# Patient Record
Sex: FEMALE | Race: WHITE | Employment: FULL TIME | URBAN - METROPOLITAN AREA
[De-identification: names, ages, dates, MRNs, and addresses within clinical notes are randomized per-mention and may not be internally consistent; named-entity substitution may affect disease eponyms.]

---

## 2017-01-16 ENCOUNTER — TELEPHONE (OUTPATIENT)
Dept: INTERNAL MEDICINE CLINIC | Age: 27
End: 2017-01-16

## 2017-01-16 RX ORDER — DEXTROAMPHETAMINE SACCHARATE, AMPHETAMINE ASPARTATE, DEXTROAMPHETAMINE SULFATE AND AMPHETAMINE SULFATE 5; 5; 5; 5 MG/1; MG/1; MG/1; MG/1
TABLET ORAL
Qty: 60 TAB | Refills: 0 | Status: SHIPPED | OUTPATIENT
Start: 2017-01-16 | End: 2017-03-16 | Stop reason: SDUPTHER

## 2017-01-17 ENCOUNTER — TELEPHONE (OUTPATIENT)
Dept: INTERNAL MEDICINE CLINIC | Age: 27
End: 2017-01-17

## 2017-01-17 RX ORDER — DEXTROAMPHETAMINE SACCHARATE, AMPHETAMINE ASPARTATE, DEXTROAMPHETAMINE SULFATE AND AMPHETAMINE SULFATE 5; 5; 5; 5 MG/1; MG/1; MG/1; MG/1
TABLET ORAL
Qty: 60 TAB | Refills: 0 | OUTPATIENT
Start: 2017-01-17 | End: 2017-02-16

## 2017-03-16 NOTE — TELEPHONE ENCOUNTER
Patient would like inhalers called into pharmacy ([id number]) before the weekend. She would like to  Adderall next week. Patient would like a call to confirm.

## 2017-03-17 RX ORDER — FLUTICASONE PROPIONATE 50 MCG
SPRAY, SUSPENSION (ML) NASAL
Qty: 1 BOTTLE | Refills: 0 | Status: SHIPPED | OUTPATIENT
Start: 2017-03-17 | End: 2017-06-02 | Stop reason: SDUPTHER

## 2017-03-17 RX ORDER — DEXTROAMPHETAMINE SACCHARATE, AMPHETAMINE ASPARTATE, DEXTROAMPHETAMINE SULFATE AND AMPHETAMINE SULFATE 5; 5; 5; 5 MG/1; MG/1; MG/1; MG/1
TABLET ORAL
Qty: 60 TAB | Refills: 0 | Status: SHIPPED | OUTPATIENT
Start: 2017-03-17 | End: 2017-06-02 | Stop reason: SDUPTHER

## 2017-03-17 RX ORDER — ALBUTEROL SULFATE 90 UG/1
1 AEROSOL, METERED RESPIRATORY (INHALATION)
Qty: 1 INHALER | Refills: 0 | Status: SHIPPED | OUTPATIENT
Start: 2017-03-17 | End: 2017-08-17 | Stop reason: SDUPTHER

## 2017-03-17 NOTE — TELEPHONE ENCOUNTER
Notify pt due now for follow up. Has appt in September but should have follow up appt now. Let her know script ready for  from .

## 2017-03-28 NOTE — TELEPHONE ENCOUNTER
Called pt and informed her script is ready for  at  and pt stated will make appt when she comes in to  script.

## 2017-06-02 RX ORDER — FLUTICASONE PROPIONATE 50 MCG
SPRAY, SUSPENSION (ML) NASAL
Qty: 1 BOTTLE | Refills: 0 | Status: SHIPPED | OUTPATIENT
Start: 2017-06-02 | End: 2017-09-08 | Stop reason: SDUPTHER

## 2017-06-03 RX ORDER — DEXTROAMPHETAMINE SACCHARATE, AMPHETAMINE ASPARTATE, DEXTROAMPHETAMINE SULFATE AND AMPHETAMINE SULFATE 5; 5; 5; 5 MG/1; MG/1; MG/1; MG/1
TABLET ORAL
Qty: 30 TAB | Refills: 0 | Status: SHIPPED | OUTPATIENT
Start: 2017-06-03 | End: 2017-07-01

## 2017-06-03 NOTE — TELEPHONE ENCOUNTER
Notify pt 30 tablets only given she is past due for follow up appt for management of controlled substance. She needs to schedule appt for now and will plan to do her physical in Sept as planned. She can  on tues June 6th.

## 2017-08-11 NOTE — TELEPHONE ENCOUNTER
Left detailed message to move her appt. Up to next week in order to get any refills per Dr Lashonda Mejia.

## 2017-08-11 NOTE — TELEPHONE ENCOUNTER
We can do a follow up appt soon than her Sept appt. Must be seen for refill. 2 previous appt were canceled. And past due.

## 2017-08-17 ENCOUNTER — OFFICE VISIT (OUTPATIENT)
Dept: INTERNAL MEDICINE CLINIC | Age: 27
End: 2017-08-17

## 2017-08-17 VITALS
HEART RATE: 75 BPM | WEIGHT: 116.6 LBS | HEIGHT: 66 IN | RESPIRATION RATE: 16 BRPM | OXYGEN SATURATION: 99 % | BODY MASS INDEX: 18.74 KG/M2 | DIASTOLIC BLOOD PRESSURE: 70 MMHG | SYSTOLIC BLOOD PRESSURE: 102 MMHG | TEMPERATURE: 98.2 F

## 2017-08-17 DIAGNOSIS — J45.30 MILD PERSISTENT ASTHMA WITHOUT COMPLICATION: ICD-10-CM

## 2017-08-17 DIAGNOSIS — F98.8 ADD (ATTENTION DEFICIT DISORDER): ICD-10-CM

## 2017-08-17 DIAGNOSIS — Z00.00 ROUTINE GENERAL MEDICAL EXAMINATION AT A HEALTH CARE FACILITY: ICD-10-CM

## 2017-08-17 DIAGNOSIS — Z00.00 ROUTINE GENERAL MEDICAL EXAMINATION AT A HEALTH CARE FACILITY: Primary | ICD-10-CM

## 2017-08-17 RX ORDER — DEXTROAMPHETAMINE SACCHARATE, AMPHETAMINE ASPARTATE, DEXTROAMPHETAMINE SULFATE AND AMPHETAMINE SULFATE 5; 5; 5; 5 MG/1; MG/1; MG/1; MG/1
20 TABLET ORAL DAILY
Qty: 30 TAB | Refills: 0 | Status: CANCELLED | OUTPATIENT
Start: 2017-08-17

## 2017-08-17 RX ORDER — DEXTROAMPHETAMINE SACCHARATE, AMPHETAMINE ASPARTATE, DEXTROAMPHETAMINE SULFATE AND AMPHETAMINE SULFATE 5; 5; 5; 5 MG/1; MG/1; MG/1; MG/1
TABLET ORAL
Qty: 60 TAB | Refills: 0 | Status: SHIPPED | OUTPATIENT
Start: 2017-10-17 | End: 2017-08-23 | Stop reason: SDUPTHER

## 2017-08-17 RX ORDER — DEXTROAMPHETAMINE SACCHARATE, AMPHETAMINE ASPARTATE, DEXTROAMPHETAMINE SULFATE AND AMPHETAMINE SULFATE 5; 5; 5; 5 MG/1; MG/1; MG/1; MG/1
TABLET ORAL
Qty: 60 TAB | Refills: 0 | Status: SHIPPED | OUTPATIENT
Start: 2017-09-17 | End: 2018-03-27

## 2017-08-17 RX ORDER — DEXTROAMPHETAMINE SACCHARATE, AMPHETAMINE ASPARTATE, DEXTROAMPHETAMINE SULFATE AND AMPHETAMINE SULFATE 5; 5; 5; 5 MG/1; MG/1; MG/1; MG/1
20 TABLET ORAL
COMMUNITY
End: 2018-03-02 | Stop reason: SDUPTHER

## 2017-08-17 RX ORDER — ALBUTEROL SULFATE 90 UG/1
1 AEROSOL, METERED RESPIRATORY (INHALATION)
Qty: 1 INHALER | Refills: 0 | Status: SHIPPED | OUTPATIENT
Start: 2017-08-17 | End: 2017-11-09 | Stop reason: SDUPTHER

## 2017-08-17 RX ORDER — DEXTROAMPHETAMINE SACCHARATE, AMPHETAMINE ASPARTATE, DEXTROAMPHETAMINE SULFATE AND AMPHETAMINE SULFATE 5; 5; 5; 5 MG/1; MG/1; MG/1; MG/1
TABLET ORAL
Qty: 60 TAB | Refills: 0 | Status: SHIPPED | OUTPATIENT
Start: 2017-08-17 | End: 2018-03-02 | Stop reason: SDUPTHER

## 2017-08-17 NOTE — PROGRESS NOTES
Chief Complaint   Patient presents with    Complete Physical     1. Have you been to the ER, urgent care clinic since your last visit? Hospitalized since your last visit? No    2. Have you seen or consulted any other health care providers outside of the [address] [place] [address] since your last visit? Include any pap smears or colon screening.  No

## 2017-08-17 NOTE — PROGRESS NOTES
Subjective:   28 y.o. female for Well Woman Check. Her gyne and breast care is done elsewhere by her Ob-Gyne physician. Health maintenance reviewed and updated with patient today at visit. Exercise:   Also here for follow up for asthma, ADD. Asthma: uses albuterol no more than 1 x a week except for prior to exercise. ADD: Tolerating medication well with no side effects. She takes primarily on her work days. Medication does help her focus. She denies any side effects. Current Outpatient Prescriptions   Medication Sig Dispense Refill    dextroamphetamine-amphetamine (ADDERALL) 20 mg tablet Take 20 mg by mouth.  fluticasone (FLONASE) 50 mcg/actuation nasal spray SHAKE WELL AND USE 2 SPRAYS IN EACH NOSTRIL EVERY DAY 1 Bottle 0    beclomethasone (QVAR) 80 mcg/actuation aero Take 1 Puff by inhalation two (2) times a day. Rinse mouth after dosing 1 Inhaler 0    albuterol (PROVENTIL HFA, VENTOLIN HFA, PROAIR HFA) 90 mcg/actuation inhaler Take 1 Puff by inhalation every six (6) hours as needed for Wheezing. 1 Inhaler 0    cetirizine (ZYRTEC) 10 mg tablet Take 10 mg by mouth daily as needed. 30 Tab 11    ibuprofen (MOTRIN) 600 mg tablet Take 1 Tab by mouth every six (6) hours as needed for Pain.  20 Tab 0     No Known Allergies  Past Medical History:   Diagnosis Date    ADD (attention deficit disorder)     Allergic rhinitis, cause unspecified     Asthma     exercise induced    MRSA (methicillin resistant Staphylococcus aureus) carrier      Past Surgical History:   Procedure Laterality Date    HX ORTHOPAEDIC  5/2006    orif  left femur metal and 3 pins      Family History   Problem Relation Age of Onset    Breast Cancer Mother     Cancer Mother 54     breast    Coronary Artery Disease Father     Heart Disease Father      cad with stints    Heart Disease Paternal Grandfather      cad stints     Social History   Substance Use Topics    Smoking status: Never Smoker    Smokeless tobacco: Never Used    Alcohol use 1.0 oz/week     2 Glasses of wine per week          ROS: Feeling generally well. No TIA's or unusual headaches, no dysphagia. No prolonged cough. No dyspnea or chest pain on exertion. No abdominal pain, change in bowel habits, black or bloody stools. No urinary tract symptoms. No new or unusual musculoskeletal symptoms. Objective: The patient appears well, alert, oriented x 3, in no distress. Visit Vitals    /70    Pulse 75    Temp 98.2 °F (36.8 °C)    Resp 16    Ht 5' 6.2\" (1.681 m)    Wt 116 lb 9.6 oz (52.9 kg)    LMP 08/14/2017    SpO2 99%    BMI 18.71 kg/m2     ENT normal.  Neck supple. No adenopathy or thyromegaly. JEF. Lungs are clear, good air entry, no wheezes, rhonchi or rales. S1 and S2 normal, no murmurs, regular rate and rhythm. Abdomen soft without tenderness, guarding, mass or organomegaly. Extremities show no edema, normal peripheral pulses. Neurological is normal, no focal findings. Breast and Pelvic exams are deferred. Assessment/Plan:     Diagnoses and all orders for this visit:    1. Routine general medical examination at a health care facility  Counseled continue healthy lifestyle, exercise, diet and weight.     -     LIPID PANEL  -     METABOLIC PANEL, COMPREHENSIVE  -     CBC WITH AUTOMATED DIFF; Future    2. ADD (attention deficit disorder)   Well controlled, continue current medication. 3. Mild persistent asthma without complication  Well controlled, continue current medication. Other orders  -     beclomethasone (QVAR) 80 mcg/actuation aero; Take 1 Puff by inhalation two (2) times a day. Rinse mouth after dosing  Indications: MAINTENANCE THERAPY FOR ASTHMA  -     albuterol (PROVENTIL HFA, VENTOLIN HFA, PROAIR HFA) 90 mcg/actuation inhaler; Take 1 Puff by inhalation every six (6) hours as needed for Wheezing.  -     dextroamphetamine-amphetamine (ADDERALL) 20 mg tablet; Earliest Fill Date: 10/17/17.   Take 1 tablet at 8 am and 1 tablet at 1 pm as needed for ADD. -     dextroamphetamine-amphetamine (ADDERALL) 20 mg tablet; Earliest Fill Date: 9/17/17. Take 1 tablet at 8 am and 1 tablet at 1 pm as needed for ADD. -     dextroamphetamine-amphetamine (ADDERALL) 20 mg tablet; Take 1 tablet at 8 am and 1 tablet at 1 pm as needed for ADD. I have discussed the diagnosis with the patient and the intended plan as seen in the above orders. The patient has received an after-visit summary and questions were answered concerning future plans. I have discussed medication side effects and warnings with the patient as well. She has expressed understanding of the diagnosis and plan    Follow-up Disposition:  Return in about 6 months (around 2/17/2018) for follow up.

## 2017-08-17 NOTE — MR AVS SNAPSHOT
Visit Information Date & Time Provider Department Dept. Phone Encounter #  
 8/17/2017  3:30 PM Caden Herrera, Alphonso9 [facility] Internal Medicine [phone] [id number] Follow-up Instructions Return in about 6 months (around 2/17/2018) for follow up. Upcoming Health Maintenance Date Due  
 PAP AKA CERVICAL CYTOLOGY 11/5/2018 DTaP/Tdap/Td series (3 - Td) 9/8/2025 Allergies as of 8/17/2017  Review Complete On: 8/17/2017 By: Caden Herrera MD  
 No Known Allergies Current Immunizations  Reviewed on 8/17/2017 Name Date HPV 5/9/2007, 1/9/2007, 11/7/2006 Hepatitis B Vaccine 9/17/1997 Human Papillomavirus 1/1/2008 TD Vaccine 1/1/2004 Tdap 9/8/2015  5:18 PM  
  
 Reviewed by Caden Herrera MD on 8/17/2017 at  4:08 PM  
 Reviewed by Caden Herrera MD on 8/17/2017 at  4:08 PM  
 Reviewed by Caden Herrera MD on 8/17/2017 at  4:09 PM  
 Reviewed by Caden Herrera MD on 8/17/2017 at  4:09 PM  
You Were Diagnosed With   
  
 Codes Comments Routine general medical examination at a health care facility    -  Primary ICD-10-CM: Z00.00 ICD-9-CM: V70.0 ADD (attention deficit disorder)     ICD-10-CM: F98.8 ICD-9-CM: 314.00 Mild persistent asthma without complication     XFK-07-HY: J45.30 ICD-9-CM: 493.90 Vitals BP Pulse Temp Resp Height(growth percentile) Weight(growth percentile) 102/70 75 98.2 °F (36.8 °C) 16 5' 6.2\" (1.681 m) 116 lb 9.6 oz (52.9 kg) LMP SpO2 BMI OB Status Smoking Status 08/14/2017 99% 18.71 kg/m2 Having regular periods Never Smoker BMI and BSA Data Body Mass Index Body Surface Area 18.71 kg/m 2 1.57 m 2 Preferred Pharmacy Pharmacy Name Phone [address] [place] [address] Dasha Lemus AT [facility]. [phone] Your Updated Medication List  
  
   
This list is accurate as of: 8/17/17  4:18 PM.  Always use your most recent med list.  
  
  
  
  
 * ADDERALL 20 mg tablet Generic drug:  dextroamphetamine-amphetamine Take 20 mg by mouth. * dextroamphetamine-amphetamine 20 mg tablet Commonly known as:  ADDERALL Take 1 tablet at 8 am and 1 tablet at 1 pm as needed for ADD. * dextroamphetamine-amphetamine 20 mg tablet Commonly known as:  ADDERALL Earliest Fill Date: 9/17/17. Take 1 tablet at 8 am and 1 tablet at 1 pm as needed for ADD. Start taking on:  9/17/2017 * dextroamphetamine-amphetamine 20 mg tablet Commonly known as:  ADDERALL Earliest Fill Date: 10/17/17. Take 1 tablet at 8 am and 1 tablet at 1 pm as needed for ADD. Start taking on:  10/17/2017  
  
 albuterol 90 mcg/actuation inhaler Commonly known as:  PROVENTIL HFA, VENTOLIN HFA, PROAIR HFA Take 1 Puff by inhalation every six (6) hours as needed for Wheezing. beclomethasone 80 mcg/actuation [employer] Commonly known as:  QVAR Take 1 Puff by inhalation two (2) times a day. Rinse mouth after dosing  Indications: MAINTENANCE THERAPY FOR ASTHMA  
  
 cetirizine 10 mg tablet Commonly known as:  ZYRTEC Take 10 mg by mouth daily as needed. fluticasone 50 mcg/actuation nasal spray Commonly known as:  Natasha Solano SHAKE WELL AND USE 2 SPRAYS IN EACH NOSTRIL EVERY DAY  
  
 ibuprofen 600 mg tablet Commonly known as:  MOTRIN Take 1 Tab by mouth every six (6) hours as needed for Pain. * Notice: This list has 4 medication(s) that are the same as other medications prescribed for you. Read the directions carefully, and ask your doctor or other care provider to review them with you. Prescriptions Printed Refills  
 dextroamphetamine-amphetamine (ADDERALL) 20 mg tablet 0 Starting on: 10/17/2017 Sig: Chanel Riis Date: 10/17/17. Take 1 tablet at 8 am and 1 tablet at 1 pm as needed for ADD. Class: Print  
 dextroamphetamine-amphetamine (ADDERALL) 20 mg tablet 0 Starting on: 9/17/2017 Sig: Earliest Praful Liang Date: 9/17/17. Take 1 tablet at 8 am and 1 tablet at 1 pm as needed for ADD. Class: Print  
 dextroamphetamine-amphetamine (ADDERALL) 20 mg tablet 0 Sig: Take 1 tablet at 8 am and 1 tablet at 1 pm as needed for ADD. Class: Print Prescriptions Sent to Pharmacy Refills  
 beclomethasone (QVAR) 80 mcg/actuation aero 1 Sig: Take 1 Puff by inhalation two (2) times a day. Rinse mouth after dosing  Indications: MAINTENANCE THERAPY FOR ASTHMA Class: Normal  
 Pharmacy: [facility] [place] [address] Ph #: [phone] Route: Inhalation  
 albuterol (PROVENTIL HFA, VENTOLIN HFA, PROAIR HFA) 90 mcg/actuation inhaler 0 Sig: Take 1 Puff by inhalation every six (6) hours as needed for Wheezing. Class: Normal  
 Pharmacy: [facility] [place] [address] Ph #: [phone] Route: Inhalation We Performed the Following LIPID PANEL [61697 CPT(R)] METABOLIC PANEL, COMPREHENSIVE [81061 CPT(R)] Follow-up Instructions Return in about 6 months (around 2/17/2018) for follow up. To-Do List   
 08/17/2017 Lab:  CBC WITH AUTOMATED DIFF Introducing [facility] SERVICES! Yvonne Ferrera introduces Biz360 patient portal. Now you can access parts of your medical record, email your doctor's office, and request medication refills online. 1. In your internet browser, go to [email or web address] 2. Click on the First Time User? Click Here link in the Sign In box. You will see the New Member Sign Up page. 3. Enter your Biz360 Access Code exactly as it appears below. You will not need to use this code after youve completed the sign-up process. If you do not sign up before the expiration date, you must request a new code. · Biz360 Access Code: [email or web address] Expires: 11/15/2017  3:35 PM 
 
 4. Enter the last four digits of your Social Security Number (xxxx) and Date of Birth (mm/dd/yyyy) as indicated and click Submit. You will be taken to the next sign-up page. 5. Create a Dogecoin ID. This will be your Dogecoin login ID and cannot be changed, so think of one that is secure and easy to remember. 6. Create a Dogecoin password. You can change your password at any time. 7. Enter your Password Reset Question and Answer. This can be used at a later time if you forget your password. 8. Enter your e-mail address. You will receive e-mail notification when new information is available in [address] 9. Click Sign Up. You can now view and download portions of your medical record. 10. Click the Download Summary menu link to download a portable copy of your medical information. If you have questions, please visit the Frequently Asked Questions section of the Dogecoin website. Remember, Dogecoin is NOT to be used for urgent needs. For medical emergencies, dial 911. Now available from your iPhone and Android! Please provide this summary of care documentation to your next provider. Your primary care clinician is listed as Rozina Mae. If you have any questions after today's visit, please call [phone].

## 2017-08-22 ENCOUNTER — TELEPHONE (OUTPATIENT)
Dept: INTERNAL MEDICINE CLINIC | Age: 27
End: 2017-08-22

## 2017-08-22 NOTE — TELEPHONE ENCOUNTER
Called pt and pt states she has lost her script for Adderall. Pt states she had it filled on Friday and took one dose on Friday and then put it in her bag/purse. She then states she does not take it on the weekend and then went to take it on Monday and stated it was not in her bag. 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

## 2017-08-22 NOTE — TELEPHONE ENCOUNTER
[phone] pt says that she just had her adderall rx filled on Friday and has lost the meds, she wonders if she could get another rx.

## 2017-08-23 RX ORDER — DEXTROAMPHETAMINE SACCHARATE, AMPHETAMINE ASPARTATE, DEXTROAMPHETAMINE SULFATE AND AMPHETAMINE SULFATE 5; 5; 5; 5 MG/1; MG/1; MG/1; MG/1
TABLET ORAL
Qty: 60 TAB | Refills: 0 | Status: SHIPPED | OUTPATIENT
Start: 2017-10-17 | End: 2017-08-25 | Stop reason: SDUPTHER

## 2017-08-23 NOTE — TELEPHONE ENCOUNTER
Notify pt will print another prescription but our policy is can only do once for lost medication. If lost again will not be able to give new prescription. Also insurance likely will require her to pay out of pocket. [facility]  [facility]. She can  prescription on August 24.

## 2017-08-23 NOTE — TELEPHONE ENCOUNTER
Called pt and informed her about the regulations for losing a prescription that is a controlled substance. Also informed her we will not be able to send in a new script if she loses it again Also informed pt that she will more than likely have to pay out of pocket for the prescription. And the prescription will be ready on 08/24/17. Pt verbalized understanding.

## 2017-08-25 ENCOUNTER — TELEPHONE (OUTPATIENT)
Dept: INTERNAL MEDICINE CLINIC | Age: 27
End: 2017-08-25

## 2017-08-25 RX ORDER — DEXTROAMPHETAMINE SACCHARATE, AMPHETAMINE ASPARTATE, DEXTROAMPHETAMINE SULFATE AND AMPHETAMINE SULFATE 5; 5; 5; 5 MG/1; MG/1; MG/1; MG/1
TABLET ORAL
Qty: 46 TAB | Refills: 0 | Status: SHIPPED | OUTPATIENT
Start: 2017-08-25 | End: 2018-03-27

## 2017-10-30 ENCOUNTER — OFFICE VISIT (OUTPATIENT)
Dept: INTERNAL MEDICINE CLINIC | Age: 27
End: 2017-10-30

## 2017-10-30 VITALS
HEIGHT: 66 IN | DIASTOLIC BLOOD PRESSURE: 80 MMHG | HEART RATE: 72 BPM | SYSTOLIC BLOOD PRESSURE: 104 MMHG | WEIGHT: 118 LBS | RESPIRATION RATE: 16 BRPM | OXYGEN SATURATION: 97 % | BODY MASS INDEX: 18.96 KG/M2 | TEMPERATURE: 98.1 F

## 2017-10-30 DIAGNOSIS — J45.41 MODERATE PERSISTENT ASTHMA WITH ACUTE EXACERBATION: Primary | ICD-10-CM

## 2017-10-30 RX ORDER — AZITHROMYCIN 250 MG/1
TABLET, FILM COATED ORAL
Qty: 6 TAB | Refills: 0 | Status: SHIPPED | OUTPATIENT
Start: 2017-10-30 | End: 2018-03-27

## 2017-10-30 RX ORDER — PREDNISONE 10 MG/1
10 TABLET ORAL SEE ADMIN INSTRUCTIONS
Qty: 21 TAB | Refills: 0 | Status: SHIPPED | OUTPATIENT
Start: 2017-10-30 | End: 2018-03-27

## 2017-10-30 NOTE — PATIENT INSTRUCTIONS
It was a pleasure to see you! As discussed:       Asthma Attack: Care Instructions  Your Care Instructions    During an asthma attack, the airways swell and narrow. This makes it hard to breathe. Severe asthma attacks can be life-threatening, but you can help prevent them by keeping your asthma under control and treating symptoms before they get bad. Symptoms include being short of breath, having chest tightness, coughing, and wheezing. Noting and treating these symptoms can also help you avoid future trips to the emergency room. The doctor has checked you carefully, but problems can develop later. If you notice any problems or new symptoms, get medical treatment right away. Follow-up care is a key part of your treatment and safety. Be sure to make and go to all appointments, and call your doctor if you are having problems. It's also a good idea to know your test results and keep a list of the medicines you take. How can you care for yourself at home? · Follow your asthma action plan to prevent and treat attacks. If you don't have an asthma action plan, work with your doctor to create one. · Take your asthma medicines exactly as prescribed. Talk to your doctor right away if you have any questions about how to take them. ¨ Use your quick-relief medicine when you have symptoms of an attack. Quick-relief medicine is usually an albuterol inhaler. Some people need to use quick-relief medicine before they exercise. ¨ Take your controller medicine every day, not just when you have symptoms. Controller medicine is usually an inhaled corticosteroid. The goal is to prevent problems before they occur. Don't use your controller medicine to treat an attack that has already started. It doesn't work fast enough to help. ¨ If your doctor prescribed corticosteroid pills to use during an attack, take them exactly as prescribed.  It may take hours for the pills to work, but they may make the episode shorter and help you breathe better. ¨ Keep your quick-relief medicine with you at all times. · Talk to your doctor before using other medicines. Some medicines, such as aspirin, can cause asthma attacks in some people. · If you have a peak flow meter, use it to check how well you are breathing. This can help you predict when an asthma attack is going to occur. Then you can take medicine to prevent the asthma attack or make it less severe. · Do not smoke or allow others to smoke around you. Avoid smoky places. Smoking makes asthma worse. If you need help quitting, talk to your doctor about stop-smoking programs and medicines. These can increase your chances of quitting for good. · Learn what triggers an asthma attack for you, and avoid the triggers when you can. Common triggers include colds, smoke, air pollution, dust, pollen, mold, pets, cockroaches, stress, and cold air. · Avoid colds and the flu. Get a pneumococcal vaccine shot. If you have had one before, ask your doctor if you need a second dose. Get a flu vaccine every fall. If you must be around people with colds or the flu, wash your hands often. When should you call for help? Call 911 anytime you think you may need emergency care. For example, call if:  ? · You have severe trouble breathing. ?Call your doctor now or seek immediate medical care if:  ? · Your symptoms do not get better after you have followed your asthma action plan. ? · You have new or worse trouble breathing. ? · Your coughing and wheezing get worse. ? · You cough up dark brown or bloody mucus (sputum). ? · You have a new or higher fever. ? Watch closely for changes in your health, and be sure to contact your doctor if:  ? · You need to use quick-relief medicine on more than 2 days a week (unless it is just for exercise). ? · You cough more deeply or more often, especially if you notice more mucus or a change in the color of your mucus. ? · You are not getting better as expected.    Where can you learn more? Go to http://jasson-yamil.info/. Enter C616 in the search box to learn more about \"Asthma Attack: Care Instructions. \"  Current as of: May 12, 2017  Content Version: 11.4  © 5408-7523 Healthwise, [employer]. Care instructions adapted under license by [employer] (which disclaims liability or warranty for this information). If you have questions about a medical condition or this instruction, always ask your healthcare professional. [place] [address] any warranty or liability for your use of this information.

## 2017-10-30 NOTE — PROGRESS NOTES
HISTORY OF PRESENT ILLNESS  Tatiana Domingo is a 28 y.o. female. Cold Symptoms   The current episode started more than 2 days ago. The problem has been gradually worsening. The cough is productive of purulent sputum. There has been no fever. Associated symptoms include chest pain (tightness ), rhinorrhea, shortness of breath and wheezing. Pertinent negatives include no chills, no ear congestion, no ear pain and no sore throat. She has tried inhalers for the symptoms. Risk factors: sick contacts. She is not a smoker. Her past medical history is significant for asthma. Review of Systems   Constitutional: Negative for chills. HENT: Positive for rhinorrhea. Negative for ear pain and sore throat. Respiratory: Positive for shortness of breath and wheezing. Cardiovascular: Positive for chest pain (tightness ). Patient Active Problem List    Diagnosis Date Noted    Allergic rhinitis 06/18/2015    Asthma 07/15/2014    Asthma, exercise induced 09/16/2011    ADD (attention deficit disorder) 09/17/2010       Current Outpatient Prescriptions   Medication Sig Dispense Refill    beclomethasone (QVAR) 80 mcg/actuation aero Take 1 Puff by inhalation two (2) times a day. Rinse mouth after dosing 3 Inhaler 0    predniSONE (STERAPRED DS) 10 mg dose pack Take 1 Tab by mouth See Admin Instructions. See administration instruction per 10mg dose pack 21 Tab 0    azithromycin (ZITHROMAX) 250 mg tablet Day 1 take 2 tabs by mouth; Days 2-5 take one tab by mouth a day 6 Tab 0    fluticasone (FLONASE) 50 mcg/actuation nasal spray SHAKE LIQUID AND USE 2 SPRAYS IN EACH NOSTRIL EVERY DAY 1 Bottle 5    dextroamphetamine-amphetamine (ADDERALL) 20 mg tablet Take 20 mg by mouth.  albuterol (PROVENTIL HFA, VENTOLIN HFA, PROAIR HFA) 90 mcg/actuation inhaler Take 1 Puff by inhalation every six (6) hours as needed for Wheezing.  1 Inhaler 0    dextroamphetamine-amphetamine (ADDERALL) 20 mg tablet Take 1 tablet at 8 am and 1 tablet at 1 pm as needed for ADD. 60 Tab 0    cetirizine (ZYRTEC) 10 mg tablet Take 10 mg by mouth daily as needed. 30 Tab 11    ibuprofen (MOTRIN) 600 mg tablet Take 1 Tab by mouth every six (6) hours as needed for Pain. 20 Tab 0       No Known Allergies   Visit Vitals    /80 (BP 1 Location: Right arm, BP Patient Position: Sitting)    Pulse 72    Temp 98.1 °F (36.7 °C) (Oral)    Resp 16    Ht 5' 6.2\" (1.681 m)    Wt 118 lb (53.5 kg)    SpO2 97%    BMI 18.93 kg/m2       Physical Exam   Constitutional: She is oriented to person, place, and time. She appears well-developed. No distress. Eyes: Conjunctivae are normal.   Neck: Neck supple. No thyromegaly present. Cardiovascular: Normal rate, regular rhythm and normal heart sounds. Pulmonary/Chest: No respiratory distress. She has no wheezes. She has no rales. She exhibits no tenderness. Prolonged expiratory phase and coughing    Lymphadenopathy:     She has no cervical adenopathy. Neurological: She is alert and oriented to person, place, and time. Skin: Skin is warm. Psychiatric: She has a normal mood and affect. ASSESSMENT and PLAN  Diagnoses and all orders for this visit:    1. Moderate persistent asthma with acute exacerbation- due to bacterial bronchitis based on s/s. Treatment as ordered. Red flags to warrant ER or earlier clinical evaluation reviewed. See AVS for full details of plan and patient discussion.     -     beclomethasone (QVAR) 80 mcg/actuation aero; Take 1 Puff by inhalation two (2) times a day. Rinse mouth after dosing  -     predniSONE (STERAPRED DS) 10 mg dose pack; Take 1 Tab by mouth See Admin Instructions. See administration instruction per 10mg dose pack  -     azithromycin (ZITHROMAX) 250 mg tablet; Day 1 take 2 tabs by mouth; Days 2-5 take one tab by mouth a day      Follow-up Disposition:  Return if symptoms worsen or fail to improve within 5 days.     Medication risks/benefits/costs/interactions/alternatives discussed with patient. Dayton Domingamanjindertrupti  was given an after visit summary which includes diagnoses, current medications, & vitals. she expressed understanding with the diagnosis and plan.

## 2017-10-30 NOTE — MR AVS SNAPSHOT
Visit Information Date & Time Provider Department Dept. Phone Encounter #  
 10/30/2017  2:45 PM Rolando Calhoun MD [address] [place] [address] Internal Medicine [phone] [id number] Follow-up Instructions Return if symptoms worsen or fail to improve within 5 days. Upcoming Health Maintenance Date Due  
 PAP AKA CERVICAL CYTOLOGY 11/5/2018 DTaP/Tdap/Td series (3 - Td) 9/8/2025 Allergies as of 10/30/2017  Review Complete On: 10/30/2017 By: Rolando Calhoun MD  
 No Known Allergies Current Immunizations  Reviewed on 8/17/2017 Name Date HPV 5/9/2007, 1/9/2007, 11/7/2006 Hepatitis B Vaccine 9/17/1997 Human Papillomavirus 1/1/2008 TD Vaccine 1/1/2004 Tdap 9/8/2015  5:18 PM  
  
 Not reviewed this visit You Were Diagnosed With   
  
 Codes Comments Moderate persistent asthma with acute exacerbation    -  Primary ICD-10-CM: J45.41 
ICD-9-CM: 493.92 Vitals BP Pulse Temp Resp Height(growth percentile) Weight(growth percentile) 104/80 (BP 1 Location: Right arm, BP Patient Position: Sitting) 72 98.1 °F (36.7 °C) (Oral) 16 5' 6.2\" (1.681 m) 118 lb (53.5 kg) LMP SpO2 BMI OB Status Smoking Status 10/29/2017 97% 18.93 kg/m2 Having regular periods Never Smoker Vitals History BMI and BSA Data Body Mass Index Body Surface Area  
 18.93 kg/m 2 1.58 m 2 Preferred Pharmacy Pharmacy Name Phone [facility] [place], [address] [place] [address] Rd AT [address] [phone] Your Updated Medication List  
  
   
This list is accurate as of: 10/30/17  3:56 PM.  Always use your most recent med list.  
  
  
  
  
 * ADDERALL 20 mg tablet Generic drug:  dextroamphetamine-amphetamine Take 20 mg by mouth. * dextroamphetamine-amphetamine 20 mg tablet Commonly known as:  ADDERALL Take 1 tablet at 8 am and 1 tablet at 1 pm as needed for ADD. albuterol 90 mcg/actuation inhaler Commonly known as:  PROVENTIL HFA, VENTOLIN HFA, PROAIR HFA Take 1 Puff by inhalation every six (6) hours as needed for Wheezing. azithromycin 250 mg tablet Commonly known as:  Sherren Mohair Day 1 take 2 tabs by mouth; Days 2-5 take one tab by mouth a day  
  
 beclomethasone 80 mcg/actuation Aero Commonly known as:  QVAR Take 1 Puff by inhalation two (2) times a day. Rinse mouth after dosing  
  
 cetirizine 10 mg tablet Commonly known as:  ZYRTEC Take 10 mg by mouth daily as needed. fluticasone 50 mcg/actuation nasal spray Commonly known as:  [place] SHAKE LIQUID AND USE 2 SPRAYS IN EACH NOSTRIL EVERY DAY  
  
 ibuprofen 600 mg tablet Commonly known as:  MOTRIN Take 1 Tab by mouth every six (6) hours as needed for Pain. predniSONE 10 mg dose pack Commonly known as:  STERAPRED DS Take 1 Tab by mouth See Admin Instructions. See administration instruction per 10mg dose pack * Notice: This list has 2 medication(s) that are the same as other medications prescribed for you. Read the directions carefully, and ask your doctor or other care provider to review them with you. Prescriptions Sent to Pharmacy Refills  
 beclomethasone (QVAR) 80 mcg/actuation aero 0 Sig: Take 1 Puff by inhalation two (2) times a day. Rinse mouth after dosing Class: Normal  
 [facility] [place] [address] Ph #: [phone] Route: Inhalation  
 predniSONE (STERAPRED DS) 10 mg dose pack 0 Sig: Take 1 Tab by mouth See Admin Instructions. See administration instruction per 10mg dose pack Class: Normal  
 [facility] [place] [address] Ph #: [phone] Route: Oral  
 azithromycin (ZITHROMAX) 250 mg tablet 0 Sig: Day 1 take 2 tabs by mouth; Days 2-5 take one tab by mouth a day  Class: Normal  
 Pharmacy: [facility] [place], [address]  #: [phone] Follow-up Instructions Return if symptoms worsen or fail to improve within 5 days. Patient Instructions It was a pleasure to see you! As discussed: 
 
  
Asthma Attack: Care Instructions Your Care Instructions During an asthma attack, the airways swell and narrow. This makes it hard to breathe. Severe asthma attacks can be life-threatening, but you can help prevent them by keeping your asthma under control and treating symptoms before they get bad. Symptoms include being short of breath, having chest tightness, coughing, and wheezing. Noting and treating these symptoms can also help you avoid future trips to the emergency room. The doctor has checked you carefully, but problems can develop later. If you notice any problems or new symptoms, get medical treatment right away. Follow-up care is a key part of your treatment and safety. Be sure to make and go to all appointments, and call your doctor if you are having problems. It's also a good idea to know your test results and keep a list of the medicines you take. How can you care for yourself at home? · Follow your asthma action plan to prevent and treat attacks. If you don't have an asthma action plan, work with your doctor to create one. · Take your asthma medicines exactly as prescribed. Talk to your doctor right away if you have any questions about how to take them. ¨ Use your quick-relief medicine when you have symptoms of an attack. Quick-relief medicine is usually an albuterol inhaler. Some people need to use quick-relief medicine before they exercise. ¨ Take your controller medicine every day, not just when you have symptoms. Controller medicine is usually an inhaled corticosteroid. The goal is to prevent problems before they occur.  Don't use your controller medicine to treat an attack that has already started. It doesn't work fast enough to help. ¨ If your doctor prescribed corticosteroid pills to use during an attack, take them exactly as prescribed. It may take hours for the pills to work, but they may make the episode shorter and help you breathe better. ¨ Keep your quick-relief medicine with you at all times. · Talk to your doctor before using other medicines. Some medicines, such as aspirin, can cause asthma attacks in some people. · If you have a peak flow meter, use it to check how well you are breathing. This can help you predict when an asthma attack is going to occur. Then you can take medicine to prevent the asthma attack or make it less severe. · Do not smoke or allow others to smoke around you. Avoid smoky places. Smoking makes asthma worse. If you need help quitting, talk to your doctor about stop-smoking programs and medicines. These can increase your chances of quitting for good. · Learn what triggers an asthma attack for you, and avoid the triggers when you can. Common triggers include colds, smoke, air pollution, dust, pollen, mold, pets, cockroaches, stress, and cold air. · Avoid colds and the flu. Get a pneumococcal vaccine shot. If you have had one before, ask your doctor if you need a second dose. Get a flu vaccine every fall. If you must be around people with colds or the flu, wash your hands often. When should you call for help? Call 911 anytime you think you may need emergency care. For example, call if: 
? · You have severe trouble breathing. ?Call your doctor now or seek immediate medical care if: 
? · Your symptoms do not get better after you have followed your asthma action plan. ? · You have new or worse trouble breathing. ? · Your coughing and wheezing get worse. ? · You cough up dark brown or bloody mucus (sputum). ? · You have a new or higher fever. ?Watch closely for changes in your health, and be sure to contact your doctor if: 
? · You need to use quick-relief medicine on more than 2 days a week (unless it is just for exercise). ? · You cough more deeply or more often, especially if you notice more mucus or a change in the color of your mucus. ? · You are not getting better as expected. Where can you learn more? Go to http://jasson-yamil.info/. Enter T443 in the search box to learn more about \"Asthma Attack: Care Instructions. \" Current as of: May 12, 2017 Content Version: 11.4 © 2564-0043 [employer]. Care instructions adapted under license by [employer] (which disclaims liability or warranty for this information). If you have questions about a medical condition or this instruction, always ask your healthcare professional. [address] any warranty or liability for your use of this information. Introducing [facility] & HEALTH SERVICES! Jt Choudhury introduces Kelan patient portal. Now you can access parts of your medical record, email your doctor's office, and request medication refills online. 1. In your internet browser, go to [email or web address] 2. Click on the First Time User? Click Here link in the Sign In box. You will see the New Member Sign Up page. 3. Enter your Kelan Access Code exactly as it appears below. You will not need to use this code after youve completed the sign-up process. If you do not sign up before the expiration date, you must request a new code. · Kelan Access Code: [email or web address] Expires: 11/15/2017  3:35 PM 
 
4. Enter the last four digits of your Social Security Number (xxxx) and Date of Birth (mm/dd/yyyy) as indicated and click Submit. You will be taken to the next sign-up page. 5. Create a Kelan ID. This will be your Kelan login ID and cannot be changed, so think of one that is secure and easy to remember. 6. Create a MoveEZ password. You can change your password at any time. 7. Enter your Password Reset Question and Answer. This can be used at a later time if you forget your password. 8. Enter your e-mail address. You will receive e-mail notification when new information is available in [address] 9. Click Sign Up. You can now view and download portions of your medical record. 10. Click the Download Summary menu link to download a portable copy of your medical information. If you have questions, please visit the Frequently Asked Questions section of the MoveEZ website. Remember, MoveEZ is NOT to be used for urgent needs. For medical emergencies, dial 911. Now available from your iPhone and Android! Please provide this summary of care documentation to your next provider. Your primary care clinician is listed as Juana Bound. If you have any questions after today's visit, please call [phone].

## 2017-10-30 NOTE — PROGRESS NOTES
Chief Complaint   Patient presents with    Cold Symptoms     1. Have you been to the ER, urgent care clinic since your last visit? Hospitalized since your last visit? No    2. Have you seen or consulted any other health care providers outside of the [address] [place] [address] since your last visit? Include any pap smears or colon screening.  No      patient states Friday, chest tightness, cough-yellow

## 2017-11-09 RX ORDER — ALBUTEROL SULFATE 90 UG/1
1 AEROSOL, METERED RESPIRATORY (INHALATION)
Qty: 1 INHALER | Refills: 0 | Status: SHIPPED | OUTPATIENT
Start: 2017-11-09 | End: 2018-03-13 | Stop reason: SDUPTHER

## 2018-03-02 DIAGNOSIS — F90.0 ATTENTION DEFICIT HYPERACTIVITY DISORDER (ADHD), PREDOMINANTLY INATTENTIVE TYPE: Primary | ICD-10-CM

## 2018-03-02 RX ORDER — DEXTROAMPHETAMINE SACCHARATE, AMPHETAMINE ASPARTATE, DEXTROAMPHETAMINE SULFATE AND AMPHETAMINE SULFATE 5; 5; 5; 5 MG/1; MG/1; MG/1; MG/1
20 TABLET ORAL DAILY
Qty: 30 TAB | Refills: 0 | Status: SHIPPED | OUTPATIENT
Start: 2018-03-02 | End: 2018-12-28 | Stop reason: SDUPTHER

## 2018-03-02 NOTE — TELEPHONE ENCOUNTER
Notify patient 30 day prescription ready for pickup. She is due for an appointment at this time. Please schedule.

## 2018-03-02 NOTE — TELEPHONE ENCOUNTER
Spoke with patient, verified name and  Advised patient that Rx is ready for pickup at this time. She expressed understanding. I also informed patient that she is due for a appointment. She stated that she would have to call back to schedule appointment.

## 2018-03-13 RX ORDER — ALBUTEROL SULFATE 90 UG/1
1 AEROSOL, METERED RESPIRATORY (INHALATION)
Qty: 1 INHALER | Refills: 0 | Status: SHIPPED | OUTPATIENT
Start: 2018-03-13 | End: 2018-07-03 | Stop reason: SDUPTHER

## 2018-03-27 ENCOUNTER — OFFICE VISIT (OUTPATIENT)
Dept: INTERNAL MEDICINE CLINIC | Age: 28
End: 2018-03-27

## 2018-03-27 VITALS
OXYGEN SATURATION: 96 % | BODY MASS INDEX: 19.19 KG/M2 | HEIGHT: 66 IN | RESPIRATION RATE: 16 BRPM | HEART RATE: 72 BPM | DIASTOLIC BLOOD PRESSURE: 70 MMHG | WEIGHT: 119.4 LBS | SYSTOLIC BLOOD PRESSURE: 100 MMHG | TEMPERATURE: 98.1 F

## 2018-03-27 DIAGNOSIS — J30.89 CHRONIC NON-SEASONAL ALLERGIC RHINITIS, UNSPECIFIED TRIGGER: ICD-10-CM

## 2018-03-27 DIAGNOSIS — F90.0 ATTENTION DEFICIT HYPERACTIVITY DISORDER (ADHD), PREDOMINANTLY INATTENTIVE TYPE: ICD-10-CM

## 2018-03-27 DIAGNOSIS — J45.20 MILD INTERMITTENT ASTHMA WITHOUT COMPLICATION: Primary | ICD-10-CM

## 2018-03-27 RX ORDER — DEXTROAMPHETAMINE SACCHARATE, AMPHETAMINE ASPARTATE, DEXTROAMPHETAMINE SULFATE AND AMPHETAMINE SULFATE 5; 5; 5; 5 MG/1; MG/1; MG/1; MG/1
TABLET ORAL
Qty: 60 TAB | Refills: 0 | Status: SHIPPED | OUTPATIENT
Start: 2018-06-12 | End: 2018-07-12

## 2018-03-27 RX ORDER — DEXTROAMPHETAMINE SACCHARATE, AMPHETAMINE ASPARTATE, DEXTROAMPHETAMINE SULFATE AND AMPHETAMINE SULFATE 5; 5; 5; 5 MG/1; MG/1; MG/1; MG/1
TABLET ORAL
Qty: 60 TAB | Refills: 0 | Status: SHIPPED | OUTPATIENT
Start: 2018-04-12 | End: 2018-04-18

## 2018-03-27 RX ORDER — DEXTROAMPHETAMINE SACCHARATE, AMPHETAMINE ASPARTATE, DEXTROAMPHETAMINE SULFATE AND AMPHETAMINE SULFATE 5; 5; 5; 5 MG/1; MG/1; MG/1; MG/1
20 TABLET ORAL DAILY
Qty: 30 TAB | Refills: 0 | Status: CANCELLED | OUTPATIENT
Start: 2018-03-27

## 2018-03-27 RX ORDER — DEXTROAMPHETAMINE SACCHARATE, AMPHETAMINE ASPARTATE, DEXTROAMPHETAMINE SULFATE AND AMPHETAMINE SULFATE 5; 5; 5; 5 MG/1; MG/1; MG/1; MG/1
TABLET ORAL
Qty: 60 TAB | Refills: 0 | Status: SHIPPED | OUTPATIENT
Start: 2018-05-12 | End: 2018-06-12

## 2018-03-27 RX ORDER — FLUTICASONE PROPIONATE 50 MCG
SPRAY, SUSPENSION (ML) NASAL
Qty: 1 BOTTLE | Refills: 5 | Status: SHIPPED | OUTPATIENT
Start: 2018-03-27

## 2018-03-27 NOTE — PROGRESS NOTES
HPI:  Olivia Hurst is a 33y.o. year old female who returns to clinic today for follow up:   asthma, ADD and allergic rhinitis. .  1.  Asthma: uses albuterol no more than 1 x every 2-3 weeks except for prior to exercise. She is exercising regularly with no problems. 2.  ADD: Tolerating medication well with no side effects. She often takes only one time a day. She takes primarily on her work days. Medication does help her focus. She denies any side effects. 3.  Allergic rhinitis: She is not using Zyrtec but is using Flonase daily with good control of her symptoms and states allergies are mainly to cats. Current Outpatient Prescriptions   Medication Sig Dispense Refill    albuterol (PROVENTIL HFA, VENTOLIN HFA, PROAIR HFA) 90 mcg/actuation inhaler Take 1 Puff by inhalation every six (6) hours as needed for Wheezing. 1 Inhaler 0    dextroamphetamine-amphetamine (ADDERALL) 20 mg tablet Take 1 Tab (20 mg total) by mouth dailyEarliest Fill Date: 3/2/18. As needed Max Daily Amount: 20 mg 30 Tab 0    beclomethasone (QVAR) 80 mcg/actuation aero Take 1 Puff by inhalation two (2) times a day. Rinse mouth after dosing 3 Inhaler 0    fluticasone (FLONASE) 50 mcg/actuation nasal spray SHAKE LIQUID AND USE 2 SPRAYS IN EACH NOSTRIL EVERY DAY 1 Bottle 5    ibuprofen (MOTRIN) 600 mg tablet Take 1 Tab by mouth every six (6) hours as needed for Pain. 20 Tab 0     No Known Allergies  Social History   Substance Use Topics    Smoking status: Never Smoker    Smokeless tobacco: Never Used    Alcohol use 1.0 oz/week     2 Glasses of wine per week         Review of Systems   Constitutional: Negative for malaise/fatigue. Respiratory: Negative for shortness of breath. Cardiovascular: Negative for chest pain, palpitations and leg swelling. Gastrointestinal: Negative for abdominal pain and heartburn. Neurological: Negative for dizziness and headaches. Psychiatric/Behavioral: The patient does not have insomnia. Physical Exam   Constitutional: She appears well-developed. No distress. /70 (BP 1 Location: Left arm, BP Patient Position: Sitting)  Pulse 72  Temp 98.1 °F (36.7 °C) (Oral)   Resp 16  Ht 5' 6.2\" (1.681 m)  Wt 119 lb 6.4 oz (54.2 kg)  LMP 03/23/2018 (Exact Date)  SpO2 96%  BMI 19.16 kg/m2   Neck: Carotid bruit is not present. Cardiovascular: Normal rate, regular rhythm, normal heart sounds and intact distal pulses. No murmur heard. Pulmonary/Chest: Effort normal and breath sounds normal. She has no wheezes. Abdominal: Soft. Bowel sounds are normal. She exhibits no distension and no mass. There is no tenderness. Musculoskeletal: She exhibits no edema. Psychiatric: She has a normal mood and affect. Vitals reviewed. Assessment & Plan:    ICD-10-CM ICD-9-CM    1. Mild intermittent asthma without complication   Well-controlled continue current medication. J45.20 493.90    2. Attention deficit hyperactivity disorder (ADHD), predominantly inattentive type  At goal continue current medication.  reviewed. F90.0 314.00 dextroamphetamine-amphetamine (ADDERALL) 20 mg tablet      dextroamphetamine-amphetamine (ADDERALL) 20 mg tablet      dextroamphetamine-amphetamine (ADDERALL) 20 mg tablet   3. Chronic non-seasonal allergic rhinitis, unspecified trigger  Well-controlled continue current medication. J30.89 477.9         Follow-up Disposition:  Return in about 6 months (around 9/27/2018). Advised her to call back or return to office if symptoms worsen/change/persist.  Discussed expected course/resolution/complications of diagnosis in detail with patient. Medication risks/benefits/costs/interactions/alternatives discussed with patient. She was given an after visit summary which includes diagnoses, current medications, & vitals. She expressed understanding with the diagnosis and plan.

## 2018-03-27 NOTE — PROGRESS NOTES
Chief Complaint   Patient presents with    Attention Deficit Disorder    Asthma    Medication Refill     Patient states she is here for follow up on ADD and needs medication refilled.

## 2018-03-27 NOTE — MR AVS SNAPSHOT
[address] 
[phone] Patient: Bruce Adams 
MRN: [id number] :1990 Visit Information Date & Time Provider Department Dept. Phone Encounter #  
 3/27/2018  1:30 PM Sukhjinderon Marquez, [address] includes the [facility] Internal Medicine [phone] Follow-up Instructions Return in about 6 months (around 2018). Upcoming Health Maintenance Date Due  
 PAP AKA CERVICAL CYTOLOGY 2018 DTaP/Tdap/Td series (3 - Td) 2025 Allergies as of 3/27/2018  Review Complete On: 3/27/2018 By: Tip Dawn LPN No Known Allergies Current Immunizations  Reviewed on 2017 Name Date HPV 2007, 2007, 2006 Hepatitis B Vaccine 1997 Human Papillomavirus 2008 TD Vaccine 2004 Tdap 2015  5:18 PM  
  
 Not reviewed this visit You Were Diagnosed With   
  
 Codes Comments Mild intermittent asthma without complication    -  Primary ICD-10-CM: J45.20 ICD-9-CM: 493.90 Attention deficit hyperactivity disorder (ADHD), predominantly inattentive type     ICD-10-CM: F90.0 ICD-9-CM: 314.00 Chronic non-seasonal allergic rhinitis, unspecified trigger     ICD-10-CM: J30.89 ICD-9-CM: 477.9 Vitals BP Pulse Temp Resp Height(growth percentile) Weight(growth percentile) 100/70 (BP 1 Location: Left arm, BP Patient Position: Sitting) 72 98.1 °F (36.7 °C) (Oral) 16 5' 6.2\" (1.681 m) 119 lb 6.4 oz (54.2 kg) LMP SpO2 BMI OB Status Smoking Status 2018 (Exact Date) 96% 19.16 kg/m2 Having regular periods Never Smoker BMI and BSA Data Body Mass Index Body Surface Area  
 19.16 kg/m 2 1.59 m 2 Preferred Pharmacy Pharmacy Name Phone [facility] [address] AT [address]  [phone] Your Updated Medication List  
  
   
 This list is accurate as of 3/27/18  2:13 PM.  Always use your most recent med list.  
  
  
  
  
 albuterol 90 mcg/actuation inhaler Commonly known as:  PROVENTIL HFA, VENTOLIN HFA, PROAIR HFA Take 1 Puff by inhalation every six (6) hours as needed for Wheezing. beclomethasone 80 mcg/actuation [employer] Commonly known as:  QVAR Take 1 Puff by inhalation two (2) times a day. Rinse mouth after dosing * dextroamphetamine-amphetamine 20 mg tablet Commonly known as:  ADDERALL Take 1 Tab (20 mg total) by mouth dailyEarliest Fill Date: 3/2/18. As needed Max Daily Amount: 20 mg  
  
 * dextroamphetamine-amphetamine 20 mg tablet Commonly known as:  ADDERALL Earliest Fill Date: 4/12/18. Take 1 tablet at 8 am and 1 tablet at 1 pm as needed for ADD. Start taking on:  4/12/2018 * dextroamphetamine-amphetamine 20 mg tablet Commonly known as:  ADDERALL Earliest Fill Date: 5/12/18. Take 1 tablet at 8 am and 1 tablet at 1 pm as needed for ADD. Start taking on:  5/12/2018 * dextroamphetamine-amphetamine 20 mg tablet Commonly known as:  ADDERALL Earliest Fill Date: 6/12/18. Take 1 tablet at 8 am and 1 tablet at 1 pm as needed for ADD. Start taking on:  6/12/2018  
  
 fluticasone 50 mcg/actuation nasal spray Commonly known as:  Theadore Francisco As needed SHAKE LIQUID AND USE 2 SPRAYS IN EACH NOSTRIL EVERY DAY  
  
 ibuprofen 600 mg tablet Commonly known as:  MOTRIN Take 1 Tab by mouth every six (6) hours as needed for Pain. * Notice: This list has 4 medication(s) that are the same as other medications prescribed for you. Read the directions carefully, and ask your doctor or other care provider to review them with you. Prescriptions Printed Refills  
 dextroamphetamine-amphetamine (ADDERALL) 20 mg tablet 0 Starting on: 4/12/2018 Sig: Earliest Iván Perkins Date: 4/12/18. Take 1 tablet at 8 am and 1 tablet at 1 pm as needed for ADD. Class: Print dextroamphetamine-amphetamine (ADDERALL) 20 mg tablet 0 Starting on: 5/12/2018 Sig: Earliest Debbie Mcmahonwood Date: 5/12/18. Take 1 tablet at 8 am and 1 tablet at 1 pm as needed for ADD. Class: Print  
 dextroamphetamine-amphetamine (ADDERALL) 20 mg tablet 0 Starting on: 6/12/2018 Sig: Earliest Debbie Vargas Date: 6/12/18. Take 1 tablet at 8 am and 1 tablet at 1 pm as needed for ADD. Class: Print Prescriptions Sent to Pharmacy Refills  
 fluticasone (FLONASE) 50 mcg/actuation nasal spray 5 Sig: As needed SHAKE LIQUID AND USE 2 SPRAYS IN EACH NOSTRIL EVERY DAY Class: Normal  
 Pharmacy: [facility] AT [address] #: [phone] Follow-up Instructions Return in about 6 months (around 9/27/2018). Introducing hospitals & HEALTH SERVICES! Samuel Arias introduces [employer] patient portal. Now you can access parts of your medical record, email your doctor's office, and request medication refills online. 1. In your internet browser, go to [email or web address] 2. Click on the First Time User? Click Here link in the Sign In box. You will see the New Member Sign Up page. 3. Enter your [employer] Access Code exactly as it appears below. You will not need to use this code after youve completed the sign-up process. If you do not sign up before the expiration date, you must request a new code. · [employer] Access Code: [email or web address] Expires: 6/25/2018  1:40 PM 
 
4. Enter the last four digits of your Social Security Number (xxxx) and Date of Birth (mm/dd/yyyy) as indicated and click Submit. You will be taken to the next sign-up page. 5. Create a Kinesio Capturet ID. This will be your Kinesio Capturet login ID and cannot be changed, so think of one that is secure and easy to remember. 6. Create a Kinesio Capturet password. You can change your password at any time. 7. Enter your Password Reset Question and Answer.  This can be used at a later time if you forget your password. 8. Enter your e-mail address. You will receive e-mail notification when new information is available in [address] 9. Click Sign Up. You can now view and download portions of your medical record. 10. Click the Download Summary menu link to download a portable copy of your medical information. If you have questions, please visit the Frequently Asked Questions section of the Cryptonator website. Remember, Cryptonator is NOT to be used for urgent needs. For medical emergencies, dial 911. Now available from your iPhone and Android! Please provide this summary of care documentation to your next provider. Your primary care clinician is listed as Elidia Alba. If you have any questions after today's visit, please call [phone].

## 2018-05-17 ENCOUNTER — TELEPHONE (OUTPATIENT)
Dept: INTERNAL MEDICINE CLINIC | Age: 28
End: 2018-05-17

## 2018-05-17 NOTE — TELEPHONE ENCOUNTER
Rachele Flynn (Self) [phone]     Pt requesting a call back regarding some anxiety she had about flyimg on her last trip to [facility].

## 2018-07-03 ENCOUNTER — OFFICE VISIT (OUTPATIENT)
Dept: INTERNAL MEDICINE CLINIC | Age: 28
End: 2018-07-03

## 2018-07-03 VITALS
DIASTOLIC BLOOD PRESSURE: 64 MMHG | BODY MASS INDEX: 19.38 KG/M2 | SYSTOLIC BLOOD PRESSURE: 102 MMHG | RESPIRATION RATE: 16 BRPM | OXYGEN SATURATION: 97 % | HEART RATE: 114 BPM | HEIGHT: 66 IN | TEMPERATURE: 98.8 F | WEIGHT: 120.6 LBS

## 2018-07-03 DIAGNOSIS — J20.9 ACUTE BRONCHITIS, UNSPECIFIED ORGANISM: Primary | ICD-10-CM

## 2018-07-03 DIAGNOSIS — J45.20 MILD INTERMITTENT ASTHMA WITHOUT COMPLICATION: ICD-10-CM

## 2018-07-03 DIAGNOSIS — R00.0 SINUS TACHYCARDIA: ICD-10-CM

## 2018-07-03 RX ORDER — ALBUTEROL SULFATE 90 UG/1
AEROSOL, METERED RESPIRATORY (INHALATION)
Qty: 1 INHALER | Refills: 0 | Status: SHIPPED | OUTPATIENT
Start: 2018-07-03 | End: 2018-10-19 | Stop reason: SDUPTHER

## 2018-07-03 RX ORDER — AMOXICILLIN AND CLAVULANATE POTASSIUM 875; 125 MG/1; MG/1
1 TABLET, FILM COATED ORAL EVERY 12 HOURS
Qty: 14 TAB | Refills: 0 | Status: SHIPPED | OUTPATIENT
Start: 2018-07-03 | End: 2018-07-10

## 2018-07-03 NOTE — MR AVS SNAPSHOT
[address] [facility] [address] [place] [address] 
[phone] Patient: Moe Borja 
MRN: [id number] :1990 Visit Information Date & Time Provider Department Dept. Phone Encounter #  
 7/3/2018 10:30 AM Caryle Macho, MD [facility] Internal [facility] [phone] [id number] Follow-up Instructions Return in about 2 days (around 2018) for Follow-up, Heart rate . Your Appointments 2018  3:30 PM  
ROUTINE CARE with Paolo Sun MD  
[facility] [facility] Internal Medicine Marc Gonzalez) Appt Note: discuss anxiety when flying; left mess to reschedule 18 hs; R/S 18  
 [address] [facility] [address] [facility] [address]  
[address] [address] [address] Upcoming Health Maintenance Date Due Influenza Age 5 to Adult 2018 PAP AKA CERVICAL CYTOLOGY 2018 DTaP/Tdap/Td series (3 - Td) 2025 Allergies as of 7/3/2018  Review Complete On: 7/3/2018 By: Caryle Macho, MD  
 No Known Allergies Current Immunizations  Reviewed on 2017 Name Date HPV 2007, 2007, 2006 Hepatitis B Vaccine 1997 Human Papillomavirus 2008 TD Vaccine 2004 Tdap 2015  5:18 PM  
  
 Not reviewed this visit You Were Diagnosed With   
  
 Codes Comments Acute bronchitis, unspecified organism    -  Primary ICD-10-CM: J20.9 ICD-9-CM: 466.0 Sinus tachycardia     ICD-10-CM: R00.0 ICD-9-CM: 427.89 Mild intermittent asthma without complication     OFX-75-VL: J45.20 ICD-9-CM: 493.90 Vitals BP Pulse Temp Resp Height(growth percentile) Weight(growth percentile) 102/64 (BP 1 Location: Right arm, BP Patient Position: Sitting) (!) 114 98.8 °F (37.1 °C) (Oral) 16 5' 6.2\" (1.681 m) 120 lb 9.6 oz (54.7 kg) LMP SpO2 BMI OB Status Smoking Status 06/12/2018 97% 19.35 kg/m2 Having regular periods Never Smoker Vitals History BMI and BSA Data Body Mass Index Body Surface Area  
 19.35 kg/m 2 1.6 m 2 Preferred Pharmacy Pharmacy Name Phone [facility] [address] AT [address] [phone] Your Updated Medication List  
  
   
This list is accurate as of 7/3/18 11:10 AM.  Always use your most recent med list.  
  
  
  
  
 albuterol 90 mcg/actuation inhaler Commonly known as:  PROVENTIL HFA, VENTOLIN HFA, PROAIR HFA Use albuterol inhaler 2-4 puffs every 4-6 hours for 48 hours then every 4-6 hours as needed. amoxicillin-clavulanate 875-125 mg per tablet Commonly known as:  AUGMENTIN Take 1 Tab by mouth every twelve (12) hours for 7 days. beclomethasone 80 mcg/actuation [employer] Commonly known as:  QVAR Take 1 Puff by inhalation two (2) times a day. Rinse mouth after dosing * dextroamphetamine-amphetamine 20 mg tablet Commonly known as:  ADDERALL Take 1 Tab (20 mg total) by mouth dailyEarliest Fill Date: 3/2/18. As needed Max Daily Amount: 20 mg  
  
 * dextroamphetamine-amphetamine 20 mg tablet Commonly known as:  ADDERALL Earliest Fill Date: 6/12/18. Take 1 tablet at 8 am and 1 tablet at 1 pm as needed for ADD. fluticasone 50 mcg/actuation nasal spray Commonly known as:  [employer] [employer] As needed SHAKE LIQUID AND USE 2 SPRAYS IN EACH NOSTRIL EVERY DAY  
  
 ibuprofen 600 mg tablet Commonly known as:  MOTRIN Take 1 Tab by mouth every six (6) hours as needed for Pain. * Notice: This list has 2 medication(s) that are the same as other medications prescribed for you. Read the directions carefully, and ask your doctor or other care provider to review them with you. Prescriptions Sent to Pharmacy  Refills  
 amoxicillin-clavulanate (AUGMENTIN) 875-125 mg per tablet 0  
 Sig: Take 1 Tab by mouth every twelve (12) hours for 7 days. Class: Normal  
 [facility] [place] [address] Ph #: [phone] Route: Oral  
 albuterol (PROVENTIL HFA, VENTOLIN HFA, PROAIR HFA) 90 mcg/actuation inhaler 0 Sig: Use albuterol inhaler 2-4 puffs every 4-6 hours for 48 hours then every 4-6 hours as needed. Class: Normal  
 Pharmacy: [place] [address] Ph #: [phone] We Performed the Following D DIMER [id number] CPT(R)] Follow-up Instructions Return in about 2 days (around 7/5/2018) for Follow-up, Heart rate . Patient Instructions It was a pleasure to see you! As discussed: You have a bacterial Bronchitis - Take the antibiotics as prescribed. -Use nasal saline spray 3-4 times/day Use albuterol inhaler 2-4 puffs every 4-6 hours for 48 hours then every 4-6 hours as needed. Your heart rate is elevated. This is likely from mild dehydration. .  
Stay well hydrated. Drink sports drinks- Gatorade, Powerade, or similar drink at least 32oz/ day. For every glass of water you drink have 2-3 crackers or a meal. Salt is essential to keeping fluid in your body. The sign that you drank enough is for your urine to be very light in color and not feeling dizziness For your  Symptoms: 
-Sore throat: Cepacol or similar lozenges, Salt water gargles -Itching: Thick creams/ lotions; Nonsedating antihistamine such as Allegra, Zyrtec, or Claritin (generic is fine) 
-Pain/ Aches: You can use Ibuprofen (no more than 2400 mg/day) or Aleve (no more than 880mg/ day) as needed. Do not use any other NSAIDs while on this medication. Do not use NSAIDs if you have high blood pressure or history of ulcers or abnormal bleeding.  
OR  
-Take Tylenol as needed for headache and body aches (every 4-8 hours, do not use more than 3000mg in one day) Bronchitis: Care Instructions Your Care Instructions Bronchitis is inflammation of the bronchial tubes, which carry air to the lungs. The tubes swell and produce mucus, or phlegm. The mucus and inflamed bronchial tubes make you cough. You may have trouble breathing. Most cases of bronchitis are caused by viruses like those that cause colds. Antibiotics usually do not help and they may be harmful. Bronchitis usually develops rapidly and lasts about 2 to 3 weeks in otherwise healthy people. Follow-up care is a key part of your treatment and safety. Be sure to make and go to all appointments, and call your doctor if you are having problems. It's also a good idea to know your test results and keep a list of the medicines you take. How can you care for yourself at home? · Take all medicines exactly as prescribed. Call your doctor if you think you are having a problem with your medicine. · Get some extra rest. 
· Take an over-the-counter pain medicine, such as acetaminophen (Tylenol), ibuprofen (Advil, Motrin), or naproxen (Aleve) to reduce fever and relieve body aches. Read and follow all instructions on the label. · Do not take two or more pain medicines at the same time unless the doctor told you to. Many pain medicines have acetaminophen, which is Tylenol. Too much acetaminophen (Tylenol) can be harmful. · Take an over-the-counter cough medicine that contains dextromethorphan to help quiet a dry, hacking cough so that you can sleep. Avoid cough medicines that have more than one active ingredient. Read and follow all instructions on the label. · Breathe moist air from a humidifier, hot shower, or sink filled with hot water. The heat and moisture will thin mucus so you can cough it out. · Do not smoke. Smoking can make bronchitis worse. If you need help quitting, talk to your doctor about stop-smoking programs and medicines. These can increase your chances of quitting for good. When should you call for help? Call 911 anytime you think you may need emergency care. For example, call if: 
? · You have severe trouble breathing. ?Call your doctor now or seek immediate medical care if: 
? · You have new or worse trouble breathing. ? · You cough up dark brown or bloody mucus (sputum). ? · You have a new or higher fever. ? · You have a new rash. ? Watch closely for changes in your health, and be sure to contact your doctor if: 
? · You cough more deeply or more often, especially if you notice more mucus or a change in the color of your mucus. ? · You are not getting better as expected. Where can you learn more? Go to http://jasson-yamil.info/. Enter H333 in the search box to learn more about \"Bronchitis: Care Instructions. \" Current as of: May 12, 2017 Content Version: 11.4 © 4484-8562 [employer]. Care instructions adapted under license by [employer] (which disclaims liability or warranty for this information). If you have questions about a medical condition or this instruction, always ask your healthcare professional. [place] [address] any warranty or liability for your use of this information. Introducing [facility]! [employer] introduces [employer] patient portal. Now you can access parts of your medical record, email your doctor's office, and request medication refills online. 1. In your internet browser, go to [email or web address] 2. Click on the First Time User? Click Here link in the Sign In box. You will see the New Member Sign Up page. 3. Enter your Bizzabo Access Code exactly as it appears below. You will not need to use this code after youve completed the sign-up process. If you do not sign up before the expiration date, you must request a new code.  
 
· [employer] Access Code: [email or web address] 
 Expires: 10/1/2018 11:10 AM 
 
4. Enter the last four digits of your Social Security Number (xxxx) and Date of Birth (mm/dd/yyyy) as indicated and click Submit. You will be taken to the next sign-up page. 5. Create a Wealthfront ID. This will be your Wealthfront login ID and cannot be changed, so think of one that is secure and easy to remember. 6. Create a Wealthfront password. You can change your password at any time. 7. Enter your Password Reset Question and Answer. This can be used at a later time if you forget your password. 8. Enter your e-mail address. You will receive e-mail notification when new information is available in [address] 9. Click Sign Up. You can now view and download portions of your medical record. 10. Click the Download Summary menu link to download a portable copy of your medical information. If you have questions, please visit the Frequently Asked Questions section of the Wealthfront website. Remember, Wealthfront is NOT to be used for urgent needs. For medical emergencies, dial 911. Now available from your iPhone and Android! Please provide this summary of care documentation to your next provider. Your primary care clinician is listed as Daron Demarco. If you have any questions after today's visit, please call [phone].

## 2018-07-03 NOTE — PROGRESS NOTES
Chief Complaint   Patient presents with    URI     1. Have you been to the ER, urgent care clinic since your last visit? Hospitalized since your last visit? No    2. Have you seen or consulted any other health care providers outside of the [address] [place] [address] since your last visit? Include any pap smears or colon screening. No    complains of cough x 2 weeks, productive with yellow mucus.  Pain on left side of chest

## 2018-07-03 NOTE — PATIENT INSTRUCTIONS
It was a pleasure to see you! As discussed: You have a bacterial Bronchitis  - Take the antibiotics as prescribed. -Use nasal saline spray 3-4 times/day   Use albuterol inhaler 2-4 puffs every 4-6 hours for 48 hours then every 4-6 hours as needed. Your heart rate is elevated. This is likely from mild dehydration. .   Stay well hydrated. Drink sports drinks- Gatorade, Powerade, or similar drink at least 32oz/ day. For every glass of water you drink have 2-3 crackers or a meal. Salt is essential to keeping fluid in your body. The sign that you drank enough is for your urine to be very light in color and not feeling dizziness    For your  Symptoms:  -Sore throat: Cepacol or similar lozenges, Salt water gargles  -Itching: Thick creams/ lotions; Nonsedating antihistamine such as Allegra, Zyrtec, or Claritin (generic is fine)  -Pain/ Aches: You can use Ibuprofen (no more than 2400 mg/day) or Aleve (no more than 880mg/ day) as needed. Do not use any other NSAIDs while on this medication. Do not use NSAIDs if you have high blood pressure or history of ulcers or abnormal bleeding. OR   -Take Tylenol as needed for headache and body aches (every 4-8 hours, do not use more than 3000mg in one day)         Bronchitis: Care Instructions  Your Care Instructions    Bronchitis is inflammation of the bronchial tubes, which carry air to the lungs. The tubes swell and produce mucus, or phlegm. The mucus and inflamed bronchial tubes make you cough. You may have trouble breathing. Most cases of bronchitis are caused by viruses like those that cause colds. Antibiotics usually do not help and they may be harmful. Bronchitis usually develops rapidly and lasts about 2 to 3 weeks in otherwise healthy people. Follow-up care is a key part of your treatment and safety. Be sure to make and go to all appointments, and call your doctor if you are having problems.  It's also a good idea to know your test results and keep a list of the medicines you take. How can you care for yourself at home? · Take all medicines exactly as prescribed. Call your doctor if you think you are having a problem with your medicine. · Get some extra rest.  · Take an over-the-counter pain medicine, such as acetaminophen (Tylenol), ibuprofen (Advil, Motrin), or naproxen (Aleve) to reduce fever and relieve body aches. Read and follow all instructions on the label. · Do not take two or more pain medicines at the same time unless the doctor told you to. Many pain medicines have acetaminophen, which is Tylenol. Too much acetaminophen (Tylenol) can be harmful. · Take an over-the-counter cough medicine that contains dextromethorphan to help quiet a dry, hacking cough so that you can sleep. Avoid cough medicines that have more than one active ingredient. Read and follow all instructions on the label. · Breathe moist air from a humidifier, hot shower, or sink filled with hot water. The heat and moisture will thin mucus so you can cough it out. · Do not smoke. Smoking can make bronchitis worse. If you need help quitting, talk to your doctor about stop-smoking programs and medicines. These can increase your chances of quitting for good. When should you call for help? Call 911 anytime you think you may need emergency care. For example, call if:  ? · You have severe trouble breathing. ?Call your doctor now or seek immediate medical care if:  ? · You have new or worse trouble breathing. ? · You cough up dark brown or bloody mucus (sputum). ? · You have a new or higher fever. ? · You have a new rash. ? Watch closely for changes in your health, and be sure to contact your doctor if:  ? · You cough more deeply or more often, especially if you notice more mucus or a change in the color of your mucus. ? · You are not getting better as expected. Where can you learn more? Go to http://jasson-yamil.info/.   Enter H333 in the search box to learn more about \"Bronchitis: Care Instructions. \"  Current as of: May 12, 2017  Content Version: 11.4  © 3017-8852 Healthwise, Incorporated. Care instructions adapted under license by [employer] (which disclaims liability or warranty for this information). If you have questions about a medical condition or this instruction, always ask your healthcare professional. [address] any warranty or liability for your use of this information.

## 2018-07-03 NOTE — PROGRESS NOTES
HISTORY OF PRESENT ILLNESS  Kirsten Salinas is a 33 y.o. female. Cold Symptoms   The current episode started more than 1 week ago (2 weeks ). The problem has been rapidly worsening. The cough is productive of purulent sputum. There has been no fever. Associated symptoms include chest pain (pleuritic ), chills, myalgias and wheezing. Pertinent negatives include no ear pain, no rhinorrhea, no sore throat, no shortness of breath, no nausea and no vomiting. Headaches: dull frontal  She has tried nothing for the symptoms. The treatment provided no relief. Risk factors: nephews were sick  She is not a smoker. Her past medical history is significant for bronchitis and asthma. Her past medical history does not include pneumonia. Review of Systems   Constitutional: Positive for chills. HENT: Negative for ear pain, rhinorrhea and sore throat. Respiratory: Positive for wheezing. Negative for shortness of breath. Cardiovascular: Positive for chest pain (pleuritic ). Gastrointestinal: Negative for nausea and vomiting. Musculoskeletal: Positive for myalgias. Neurological: Headaches: dull frontal      Patient Active Problem List    Diagnosis Date Noted    Allergic rhinitis 06/18/2015    Asthma 07/15/2014    Asthma, exercise induced 09/16/2011    ADD (attention deficit disorder) 09/17/2010       Current Outpatient Prescriptions   Medication Sig Dispense Refill    fluticasone (FLONASE) 50 mcg/actuation nasal spray As needed SHAKE LIQUID AND USE 2 SPRAYS IN EACH NOSTRIL EVERY DAY 1 Bottle 5    dextroamphetamine-amphetamine (ADDERALL) 20 mg tablet Earliest Fill Date: 6/12/18. Take 1 tablet at 8 am and 1 tablet at 1 pm as needed for ADD. 60 Tab 0    albuterol (PROVENTIL HFA, VENTOLIN HFA, PROAIR HFA) 90 mcg/actuation inhaler Take 1 Puff by inhalation every six (6) hours as needed for Wheezing.  1 Inhaler 0    dextroamphetamine-amphetamine (ADDERALL) 20 mg tablet Take 1 Tab (20 mg total) by mouth dailyEarliest Fill Date: 3/2/18. As needed Max Daily Amount: 20 mg 30 Tab 0    beclomethasone (QVAR) 80 mcg/actuation aero Take 1 Puff by inhalation two (2) times a day. Rinse mouth after dosing 3 Inhaler 0    ibuprofen (MOTRIN) 600 mg tablet Take 1 Tab by mouth every six (6) hours as needed for Pain. 20 Tab 0       No Known Allergies   Visit Vitals    /64 (BP 1 Location: Right arm, BP Patient Position: Sitting)    Pulse (!) 114    Temp 98.8 °F (37.1 °C) (Oral)    Resp 16    Ht 5' 6.2\" (1.681 m)    Wt 120 lb 9.6 oz (54.7 kg)    SpO2 97%    BMI 19.35 kg/m2       Physical Exam   Constitutional: She is oriented to person, place, and time. She appears well-developed. No distress. Eyes: Conjunctivae are normal.   Neck: Neck supple. Cardiovascular: Regular rhythm and normal heart sounds. Sinus tachycardia    Pulmonary/Chest: Effort normal. No respiratory distress. She has no wheezes. She has no rales. She exhibits no tenderness. Coarse BS diffusely   Musculoskeletal: She exhibits no edema (Ble: No calf ttp ). Neurological: She is alert and oriented to person, place, and time. Skin: Skin is warm. Psychiatric: She has a normal mood and affect. ASSESSMENT and PLAN  Diagnoses and all orders for this visit:    1. Acute bronchitis, unspecified organism- bacterial given s/s. Ambulatory sat wnl (98%) Abx indicated. Steroids not indicated at this point. Use albuterol inhaler 2-4 puffs every 4-6 hours for 48 hours then every 4-6 hours as needed. Red flags to warrant ER or earlier clinical evaluation reviewed. -     amoxicillin-clavulanate (AUGMENTIN) 875-125 mg per tablet; Take 1 Tab by mouth every twelve (12) hours for 7 days. 2. Sinus tachycardia-low risk for VT E. Consider getting a d-dimer given her low probability based on the fact that she has pleuritic pain and sinus tachycardia.   However patient reports that she has serious vaso-motor response to the lab draws and is unable to complete the d-dimer at this time. Given the fact that she has just returned from a beach trip and is acutely ill with her bronchitis seems more likely that mild dehydration is the cause of her  sinus tachycardia. Will have her optimize her hydration with sports drinks over the next 48 hours and return to clinic for evaluation. I thoroughly reviewed the indications for her to go to the ER overnight should her symptoms worsen prior to her appointment. -     D DIMER    3. Mild intermittent asthma without complication  -     albuterol (PROVENTIL HFA, VENTOLIN HFA, PROAIR HFA) 90 mcg/actuation inhaler; Use albuterol inhaler 2-4 puffs every 4-6 hours for 48 hours then every 4-6 hours as needed. Follow-up Disposition:  Return in about 2 days (around 7/5/2018) for Follow-up, Heart rate . Medication risks/benefits/costs/interactions/alternatives discussed with patient. Dilia Hernadez  was given an after visit summary which includes diagnoses, current medications, & vitals. she expressed understanding with the diagnosis and plan.

## 2018-07-05 ENCOUNTER — OFFICE VISIT (OUTPATIENT)
Dept: INTERNAL MEDICINE CLINIC | Age: 28
End: 2018-07-05

## 2018-07-05 VITALS
TEMPERATURE: 97.9 F | RESPIRATION RATE: 14 BRPM | SYSTOLIC BLOOD PRESSURE: 116 MMHG | HEIGHT: 66 IN | HEART RATE: 82 BPM | DIASTOLIC BLOOD PRESSURE: 66 MMHG | OXYGEN SATURATION: 97 %

## 2018-07-05 DIAGNOSIS — J40 BRONCHITIS: Primary | ICD-10-CM

## 2018-07-05 DIAGNOSIS — J45.20 MILD INTERMITTENT ASTHMA WITHOUT COMPLICATION: ICD-10-CM

## 2018-07-05 NOTE — PATIENT INSTRUCTIONS
It was a pleasure to see you! As discussed:    I'm glad you are better. Complete the antibiotics. Stay well hydrated. Bronchitis: Care Instructions  Your Care Instructions    Bronchitis is inflammation of the bronchial tubes, which carry air to the lungs. The tubes swell and produce mucus, or phlegm. The mucus and inflamed bronchial tubes make you cough. You may have trouble breathing. Most cases of bronchitis are caused by viruses like those that cause colds. Antibiotics usually do not help and they may be harmful. Bronchitis usually develops rapidly and lasts about 2 to 3 weeks in otherwise healthy people. Follow-up care is a key part of your treatment and safety. Be sure to make and go to all appointments, and call your doctor if you are having problems. It's also a good idea to know your test results and keep a list of the medicines you take. How can you care for yourself at home? · Take all medicines exactly as prescribed. Call your doctor if you think you are having a problem with your medicine. · Get some extra rest.  · Take an over-the-counter pain medicine, such as acetaminophen (Tylenol), ibuprofen (Advil, Motrin), or naproxen (Aleve) to reduce fever and relieve body aches. Read and follow all instructions on the label. · Do not take two or more pain medicines at the same time unless the doctor told you to. Many pain medicines have acetaminophen, which is Tylenol. Too much acetaminophen (Tylenol) can be harmful. · Take an over-the-counter cough medicine that contains dextromethorphan to help quiet a dry, hacking cough so that you can sleep. Avoid cough medicines that have more than one active ingredient. Read and follow all instructions on the label. · Breathe moist air from a humidifier, hot shower, or sink filled with hot water. The heat and moisture will thin mucus so you can cough it out. · Do not smoke. Smoking can make bronchitis worse.  If you need help quitting, talk to your doctor about stop-smoking programs and medicines. These can increase your chances of quitting for good. When should you call for help? Call 911 anytime you think you may need emergency care. For example, call if:  ? · You have severe trouble breathing. ?Call your doctor now or seek immediate medical care if:  ? · You have new or worse trouble breathing. ? · You cough up dark brown or bloody mucus (sputum). ? · You have a new or higher fever. ? · You have a new rash. ? Watch closely for changes in your health, and be sure to contact your doctor if:  ? · You cough more deeply or more often, especially if you notice more mucus or a change in the color of your mucus. ? · You are not getting better as expected. Where can you learn more? Go to http://jasson-yamil.info/. Enter H333 in the search box to learn more about \"Bronchitis: Care Instructions. \"  Current as of: May 12, 2017  Content Version: 11.4  © 6726-6571 Healthwise, Incorporated. Care instructions adapted under license by [employer] (which disclaims liability or warranty for this information). If you have questions about a medical condition or this instruction, always ask your healthcare professional. [address] any warranty or liability for your use of this information.

## 2018-07-05 NOTE — PROGRESS NOTES
Chief Complaint   Patient presents with    Rapid Heart Rate     1. Have you been to the ER, urgent care clinic since your last visit? Hospitalized since your last visit? No    2. Have you seen or consulted any other health care providers outside of the [address] [place] [address] since your last visit? Include any pap smears or colon screening.  No

## 2018-07-05 NOTE — PROGRESS NOTES
HISTORY OF PRESENT ILLNESS  Sidney Garcia is a 33 y.o. female. Rapid Heart Rate   The current episode started 2 days ago. The problem has been rapidly improving. Pertinent negatives include no chest pain. Nothing aggravates the symptoms. Treatments tried: Increased fluids. Antibiotics      Bronchitis Follow-up   Patient presents for follow-up. Seen   2 days ago. Placed on- Augmentin. Reports  Improved:  cough described as productive of green/yellow sputum and chest pain. Still has:  cough described as with blood tinged sputum   Denies: wheezing, AGUDELO, leg swelling. Review of Systems   Cardiovascular: Negative for chest pain. per HPI   Patient Active Problem List    Diagnosis Date Noted    Allergic rhinitis 06/18/2015    Asthma 07/15/2014    Asthma, exercise induced 09/16/2011    ADD (attention deficit disorder) 09/17/2010       Current Outpatient Prescriptions   Medication Sig Dispense Refill    amoxicillin-clavulanate (AUGMENTIN) 875-125 mg per tablet Take 1 Tab by mouth every twelve (12) hours for 7 days. 14 Tab 0    albuterol (PROVENTIL HFA, VENTOLIN HFA, PROAIR HFA) 90 mcg/actuation inhaler Use albuterol inhaler 2-4 puffs every 4-6 hours for 48 hours then every 4-6 hours as needed. 1 Inhaler 0    fluticasone (FLONASE) 50 mcg/actuation nasal spray As needed SHAKE LIQUID AND USE 2 SPRAYS IN EACH NOSTRIL EVERY DAY 1 Bottle 5    dextroamphetamine-amphetamine (ADDERALL) 20 mg tablet Earliest Fill Date: 6/12/18. Take 1 tablet at 8 am and 1 tablet at 1 pm as needed for ADD. 60 Tab 0    dextroamphetamine-amphetamine (ADDERALL) 20 mg tablet Take 1 Tab (20 mg total) by mouth dailyEarliest Fill Date: 3/2/18. As needed Max Daily Amount: 20 mg 30 Tab 0    beclomethasone (QVAR) 80 mcg/actuation aero Take 1 Puff by inhalation two (2) times a day. Rinse mouth after dosing 3 Inhaler 0    ibuprofen (MOTRIN) 600 mg tablet Take 1 Tab by mouth every six (6) hours as needed for Pain.  20 Tab 0       No Known Allergies   Visit Vitals    /66 (BP 1 Location: Right arm, BP Patient Position: Sitting)    Pulse 82    Temp 97.9 °F (36.6 °C) (Oral)    Resp 14    Ht 5' 6.2\" (1.681 m)    SpO2 97%       Physical Exam   Constitutional: She is oriented to person, place, and time. She appears well-developed. No distress. Mother present for support    Eyes: Conjunctivae are normal.   Neck: Neck supple. Cardiovascular: Normal rate and regular rhythm. Pulmonary/Chest: Effort normal and breath sounds normal. No respiratory distress. She has no wheezes. She has no rales. She exhibits no tenderness. Pictures present of blood tinged sputum     Neurological: She is alert and oriented to person, place, and time. Skin: Skin is warm. Psychiatric: She has a normal mood and affect. Slightly anxious         ASSESSMENT and PLAN  Diagnoses and all orders for this visit:    1. Bronchitis- improving with abx and albuterol. Mild blood tinged sputum due to inflammation. Expectant management reviewed. Imaging indicated if no improvement after abx    2. Mild intermittent asthma without complication- resolving     3. Sinus tachycardia- resolved. Due to mild dehydration. No further workup for VTE indicated at this time. Red flags to warrant ER or earlier clinical evaluation reviewed. Follow-up Disposition:  Return if symptoms worsen or fail to improve after antibiotics. Medication risks/benefits/costs/interactions/alternatives discussed with patient. Fabian Harris  was given an after visit summary which includes diagnoses, current medications, & vitals. she expressed understanding with the diagnosis and plan.

## 2018-07-05 NOTE — MR AVS SNAPSHOT
[address] [facility] [address] [place] [address] 
[phone] Patient: Dilia Hernadez 
MRN: [id number] :1990 Visit Information Date & Time Provider Department Dept. Phone Encounter #  
 2018 10:45 AM Mine Harp MD [facility] Internal Medicine [phone] [id number] Follow-up Instructions Return if symptoms worsen or fail to improve after antibiotics. Follow-up and Disposition History Your Appointments 2018  3:30 PM  
ROUTINE CARE with Damari Spring MD  
[facility] Internal Medicine [address]) Appt Note: discuss anxiety when flying; left mess to reschedule 18 hs; R/S 18  
 [address] [facility] [address] [facility] [address]  
[facility] [id number] [place] [address] Upcoming Health Maintenance Date Due Influenza Age 5 to Adult 2018 PAP AKA CERVICAL CYTOLOGY 2018 DTaP/Tdap/Td series (3 - Td) 2025 Allergies as of 2018  Review Complete On: 2018 By: Mine Harp MD  
 No Known Allergies Current Immunizations  Reviewed on 2017 Name Date HPV 2007, 2007, 2006 Hepatitis B Vaccine 1997 Human Papillomavirus 2008 TD Vaccine 2004 Tdap 2015  5:18 PM  
  
 Not reviewed this visit You Were Diagnosed With   
  
 Codes Comments Bronchitis    -  Primary ICD-10-CM: P29 ICD-9-CM: 479 Mild intermittent asthma without complication     AF--: J45.20 ICD-9-CM: 493.90 Vitals BP Pulse Temp Resp Height(growth percentile) LMP  
 116/66 (BP 1 Location: Right arm, BP Patient Position: Sitting) 82 97.9 °F (36.6 °C) (Oral) 14 5' 6.2\" (1.681 m) 2018 SpO2 OB Status Smoking Status 97% Having regular periods Never Smoker Vitals History Preferred Pharmacy Pharmacy Name Phone [facility] [address] AT [address] [phone] Your Updated Medication List  
  
   
This list is accurate as of 7/5/18 11:16 AM.  Always use your most recent med list.  
  
  
  
  
 albuterol 90 mcg/actuation inhaler Commonly known as:  PROVENTIL HFA, VENTOLIN HFA, PROAIR HFA Use albuterol inhaler 2-4 puffs every 4-6 hours for 48 hours then every 4-6 hours as needed. amoxicillin-clavulanate 875-125 mg per tablet Commonly known as:  AUGMENTIN Take 1 Tab by mouth every twelve (12) hours for 7 days. beclomethasone 80 mcg/actuation [employer] Commonly known as:  QVAR Take 1 Puff by inhalation two (2) times a day. Rinse mouth after dosing * dextroamphetamine-amphetamine 20 mg tablet Commonly known as:  ADDERALL Take 1 Tab (20 mg total) by mouth dailyEarliest Fill Date: 3/2/18. As needed Max Daily Amount: 20 mg  
  
 * dextroamphetamine-amphetamine 20 mg tablet Commonly known as:  ADDERALL Earliest Fill Date: 6/12/18. Take 1 tablet at 8 am and 1 tablet at 1 pm as needed for ADD. fluticasone 50 mcg/actuation nasal spray Commonly known as:  [employer]ay[employer] Peel As needed SHAKE LIQUID AND USE 2 SPRAYS IN EACH NOSTRIL EVERY DAY  
  
 ibuprofen 600 mg tablet Commonly known as:  MOTRIN Take 1 Tab by mouth every six (6) hours as needed for Pain. * Notice: This list has 2 medication(s) that are the same as other medications prescribed for you. Read the directions carefully, and ask your doctor or other care provider to review them with you. Follow-up Instructions Return if symptoms worsen or fail to improve after antibiotics. Patient Instructions It was a pleasure to see you! As discussed: 
 
I'm glad you are better. Complete the antibiotics. Stay well hydrated. Bronchitis: Care Instructions Your Care Instructions Bronchitis is inflammation of the bronchial tubes, which carry air to the lungs. The tubes swell and produce mucus, or phlegm. The mucus and inflamed bronchial tubes make you cough. You may have trouble breathing. Most cases of bronchitis are caused by viruses like those that cause colds. Antibiotics usually do not help and they may be harmful. Bronchitis usually develops rapidly and lasts about 2 to 3 weeks in otherwise healthy people. Follow-up care is a key part of your treatment and safety. Be sure to make and go to all appointments, and call your doctor if you are having problems. It's also a good idea to know your test results and keep a list of the medicines you take. How can you care for yourself at home? · Take all medicines exactly as prescribed. Call your doctor if you think you are having a problem with your medicine. · Get some extra rest. 
· Take an over-the-counter pain medicine, such as acetaminophen (Tylenol), ibuprofen (Advil, Motrin), or naproxen (Aleve) to reduce fever and relieve body aches. Read and follow all instructions on the label. · Do not take two or more pain medicines at the same time unless the doctor told you to. Many pain medicines have acetaminophen, which is Tylenol. Too much acetaminophen (Tylenol) can be harmful. · Take an over-the-counter cough medicine that contains dextromethorphan to help quiet a dry, hacking cough so that you can sleep. Avoid cough medicines that have more than one active ingredient. Read and follow all instructions on the label. · Breathe moist air from a humidifier, hot shower, or sink filled with hot water. The heat and moisture will thin mucus so you can cough it out. · Do not smoke. Smoking can make bronchitis worse. If you need help quitting, talk to your doctor about stop-smoking programs and medicines. These can increase your chances of quitting for good. When should you call for help? Call 911 anytime you think you may need emergency care. For example, call if: 
? · You have severe trouble breathing. ?Call your doctor now or seek immediate medical care if: 
? · You have new or worse trouble breathing. ? · You cough up dark brown or bloody mucus (sputum). ? · You have a new or higher fever. ? · You have a new rash. ? Watch closely for changes in your health, and be sure to contact your doctor if: 
? · You cough more deeply or more often, especially if you notice more mucus or a change in the color of your mucus. ? · You are not getting better as expected. Where can you learn more? Go to http://jasson-yamil.info/. Enter H333 in the search box to learn more about \"Bronchitis: Care Instructions. \" Current as of: May 12, 2017 Content Version: 11.4 © 4807-7386 Yesware. Care instructions adapted under license by [employer] (which disclaims liability or warranty for this information). If you have questions about a medical condition or this instruction, always ask your healthcare professional. [address] any warranty or liability for your use of this information. Introducing \[facility]\"" & HEALTH SERVICES! Meredith Waters introduces [employer] patient portal. Now you can access parts of your medical record, email your doctor's office, and request medication refills online. 1. In your internet browser, go to [email or web address] 2. Click on the First Time User? Click Here link in the Sign In box. You will see the New Member Sign Up page. 3. Enter your [employer] Access Code exactly as it appears below. You will not need to use this code after youve completed the sign-up process. If you do not sign up before the expiration date, you must request a new code. · [employer] Access Code: [email or web address] Expires: 10/1/2018 11:10 AM 
 
4.  Enter the last four digits of your Social Security Number (xxxx) and Date of Birth (mm/dd/yyyy) as indicated and click Submit. You will be taken to the next sign-up page. 5. Create a Storrz ID. This will be your Storrz login ID and cannot be changed, so think of one that is secure and easy to remember. 6. Create a Storrz password. You can change your password at any time. 7. Enter your Password Reset Question and Answer. This can be used at a later time if you forget your password. 8. Enter your e-mail address. You will receive e-mail notification when new information is available in [address] 9. Click Sign Up. You can now view and download portions of your medical record. 10. Click the Download Summary menu link to download a portable copy of your medical information. If you have questions, please visit the Frequently Asked Questions section of the Storrz website. Remember, Storrz is NOT to be used for urgent needs. For medical emergencies, dial 911. Now available from your iPhone and Android! Please provide this summary of care documentation to your next provider. Your primary care clinician is listed as Katey Damian. If you have any questions after today's visit, please call [phone].

## 2018-08-13 ENCOUNTER — TELEPHONE (OUTPATIENT)
Dept: INTERNAL MEDICINE CLINIC | Age: 28
End: 2018-08-13

## 2018-08-13 NOTE — LETTER
8/14/2018 2:15 PM 
 
Ms. Harinder Devine 
[address] [facility] [address] [id number] To Whom it May Concern:  
 
Harinder Devine is a current patient of this practice. Patient was seen for two acute visits early July and due to respiratory symptoms was unable to fly to [place] 7[place]23/18-7/29/18. If you have further questions please have the patient contact our office. Sincerely, 
 
 
Dr Refugio Soriano

## 2018-08-14 ENCOUNTER — OFFICE VISIT (OUTPATIENT)
Dept: INTERNAL MEDICINE CLINIC | Age: 28
End: 2018-08-14

## 2018-08-14 VITALS
OXYGEN SATURATION: 98 % | WEIGHT: 119.6 LBS | HEIGHT: 66 IN | TEMPERATURE: 98.2 F | DIASTOLIC BLOOD PRESSURE: 72 MMHG | SYSTOLIC BLOOD PRESSURE: 110 MMHG | HEART RATE: 72 BPM | BODY MASS INDEX: 19.22 KG/M2 | RESPIRATION RATE: 16 BRPM

## 2018-08-14 DIAGNOSIS — F41.8 SITUATIONAL ANXIETY: Primary | ICD-10-CM

## 2018-08-14 RX ORDER — ALPRAZOLAM 0.5 MG/1
TABLET ORAL
Qty: 10 TAB | Refills: 0 | Status: SHIPPED | OUTPATIENT
Start: 2018-08-14 | End: 2019-01-03

## 2018-08-14 NOTE — PROGRESS NOTES
Chief Complaint   Patient presents with    Anxiety     when flying     Patient states she is here to discuss anxiety when flying. Patient sees GYN for pap tests.

## 2018-08-14 NOTE — TELEPHONE ENCOUNTER
Pt. Is requesting a work note excusing her from [address] to [place]. 7/23-7/29/18. Pt. States she was still having residual blood flecked sputum and chest pain and was not comfortable flying. Pt. States symptoms have resolved now. Pt. Had seen Dr Eladia Choi for 2 acute visits. Pt. Wants to  the note if approved today when she comes in for her routine appt. With Dr Mani Vogel. Informed I will forward to Dr Eladia Choi.

## 2018-08-14 NOTE — MR AVS SNAPSHOT
[address] [facility] [address] [place] [address] 
[phone] Patient: Claribel Rosales 
MRN: [id number] :1990 Visit Information Date & Time Provider Department Dept. Phone Encounter #  
 2018  1:45 PM Domenic Mcardle, [address] [facility] Internal Medicine [phone] [id number] Upcoming Health Maintenance Date Due  
 PAP AKA CERVICAL CYTOLOGY 2018 Influenza Age 5 to Adult 2019* DTaP/Tdap/Td series (3 - Td) 2025 *Topic was postponed. The date shown is not the original due date. Allergies as of 2018  Review Complete On: 2018 By: Domenic Mcardle, MD  
 No Known Allergies Current Immunizations  Reviewed on 2017 Name Date HPV 2007, 2007, 2006 Hepatitis B Vaccine 1997 Human Papillomavirus 2008 TD Vaccine 2004 Tdap 2015  5:18 PM  
  
 Not reviewed this visit You Were Diagnosed With   
  
 Codes Comments Situational anxiety    -  Primary ICD-10-CM: F41.8 ICD-9-CM: 300.09 Vitals BP Pulse Temp Resp Height(growth percentile) Weight(growth percentile) 110/72 (BP 1 Location: Left arm, BP Patient Position: Sitting) 72 98.2 °F (36.8 °C) (Oral) 16 5' 6.2\" (1.681 m) 119 lb 9.6 oz (54.3 kg) LMP SpO2 BMI OB Status Smoking Status 2018 (Approximate) 98% 19.19 kg/m2 Having regular periods Never Smoker BMI and BSA Data Body Mass Index Body Surface Area  
 19.19 kg/m 2 1.59 m 2 Preferred Pharmacy Pharmacy Name Phone [facility] DR[facility] STORE [address] [phone] Your Updated Medication List  
  
   
This list is accurate as of 18  2:36 PM.  Always use your most recent med list.  
  
  
  
  
 albuterol 90 mcg/actuation inhaler Commonly known as:  PROVENTIL HFA, VENTOLIN HFA, PROAIR HFA  
 Use albuterol inhaler 2-4 puffs every 4-6 hours for 48 hours then every 4-6 hours as needed. ALPRAZolam 0.5 mg tablet Commonly known as:  [employer] [place] Take 1-2 tablets 1 hour prior flight and then may repeat every 6 hours as needed for anxiety. beclomethasone 80 mcg/actuation [employer] Commonly known as:  QVAR Take 1 Puff by inhalation two (2) times a day. Rinse mouth after dosing  
  
 dextroamphetamine-amphetamine 20 mg tablet Commonly known as:  ADDERALL Take 1 Tab (20 mg total) by mouth dailyEarliest Fill Date: 3/2/18. As needed Max Daily Amount: 20 mg  
  
 fluticasone 50 mcg/actuation nasal spray Commonly known as:  [facility] [place] As needed SHAKE LIQUID AND USE 2 SPRAYS IN EACH NOSTRIL EVERY DAY  
  
 ibuprofen 600 mg tablet Commonly known as:  MOTRIN Take 1 Tab by mouth every six (6) hours as needed for Pain. Prescriptions Printed Refills ALPRAZolam (XANAX) 0.5 mg tablet 0 Sig: Take 1-2 tablets 1 hour prior flight and then may repeat every 6 hours as needed for anxiety. Class: Print Introducing [facility] & HEALTH SERVICES! Verenice Gamez introduces [employer] patient portal. Now you can access parts of your medical record, email your doctor's office, and request medication refills online. 1. In your internet browser, go to [email or web address] 2. Click on the First Time User? Click Here link in the Sign In box. You will see the New Member Sign Up page. 3. Enter your [employer] Access Code exactly as it appears below. You will not need to use this code after youve completed the sign-up process. If you do not sign up before the expiration date, you must request a new code. · [employer] Access Code: [email or web address] Expires: 10/1/2018 11:10 AM 
 
4. Enter the last four digits of your Social Security Number (xxxx) and Date of Birth (mm/dd/yyyy) as indicated and click Submit. You will be taken to the next sign-up page. 5. Create a EyesBot ID. This will be your EyesBot login ID and cannot be changed, so think of one that is secure and easy to remember. 6. Create a EyesBot password. You can change your password at any time. 7. Enter your Password Reset Question and Answer. This can be used at a later time if you forget your password. 8. Enter your e-mail address. You will receive e-mail notification when new information is available in [address] 9. Click Sign Up. You can now view and download portions of your medical record. 10. Click the Download Summary menu link to download a portable copy of your medical information. If you have questions, please visit the Frequently Asked Questions section of the EyesBot website. Remember, EyesBot is NOT to be used for urgent needs. For medical emergencies, dial 911. Now available from your iPhone and Android! Please provide this summary of care documentation to your next provider. Your primary care clinician is listed as Larry Mott. If you have any questions after today's visit, please call [phone].

## 2018-08-14 NOTE — PROGRESS NOTES
HPI:  Marlee Lockwood is a 33y.o. year old female who returns to clinic today for an acute visit:  She is here to discuss anxiety about flying. She flew in May to [place] and about an hour into the flight developed diarrhea and felt very anxious. Symptoms resolved after she landed. The night before she left [place] to fly back she again felt very anxious. She has a smaller episode of anxiety flying home but no recurrence of diarrhea. She is getting  9/8/18 at [employer]. She is getting ready to travel \[facility]\"" on her honey garcia. She denies any depression or anxiety in other than related to travel. She also notes claustrophobia. Current Outpatient Prescriptions   Medication Sig Dispense Refill    albuterol (PROVENTIL HFA, VENTOLIN HFA, PROAIR HFA) 90 mcg/actuation inhaler Use albuterol inhaler 2-4 puffs every 4-6 hours for 48 hours then every 4-6 hours as needed. 1 Inhaler 0    fluticasone (FLONASE) 50 mcg/actuation nasal spray As needed SHAKE LIQUID AND USE 2 SPRAYS IN EACH NOSTRIL EVERY DAY 1 Bottle 5    dextroamphetamine-amphetamine (ADDERALL) 20 mg tablet Take 1 Tab (20 mg total) by mouth dailyEarliest Fill Date: 3/2/18. As needed Max Daily Amount: 20 mg 30 Tab 0    beclomethasone (QVAR) 80 mcg/actuation aero Take 1 Puff by inhalation two (2) times a day. Rinse mouth after dosing 3 Inhaler 0    ibuprofen (MOTRIN) 600 mg tablet Take 1 Tab by mouth every six (6) hours as needed for Pain. 20 Tab 0     No Known Allergies  Social History   Substance Use Topics    Smoking status: Never Smoker    Smokeless tobacco: Never Used    Alcohol use 1.0 oz/week     2 Glasses of wine per week         Review of Systems   Respiratory: Negative for shortness of breath. Cardiovascular: Negative for chest pain. Gastrointestinal: Negative for abdominal pain and diarrhea. Physical Exam   Constitutional: She appears well-developed. No distress.    /72 (BP 1 Location: Left arm, BP Patient Position: Sitting)  Pulse 72  Temp 98.2 °F (36.8 °C) (Oral)   Resp 16  Ht 5' 6.2\" (1.681 m)  Wt 119 lb 9.6 oz (54.3 kg)  LMP 08/11/2018 (Approximate)  SpO2 98%  BMI 19.19 kg/m2   Cardiovascular: Normal rate, regular rhythm and intact distal pulses. No murmur heard. Pulmonary/Chest: Effort normal and breath sounds normal. She has no wheezes. Abdominal: Soft. Bowel sounds are normal. She exhibits no distension and no mass. There is no tenderness. Musculoskeletal: She exhibits no edema. Psychiatric: She has a normal mood and affect. Vitals reviewed. Assessment & Plan:    ICD-10-CM ICD-9-CM    1. Situational anxiety F41.8 300.09 ALPRAZolam (XANAX) 0.5 mg tablet   reviewed treatment options and recommend xanax for her flight anxiety. Reviewed potential side effects of medication and dosing. Follow-up Disposition:  Return if symptoms worsen or fail to improve. Advised her to call back or return to office if symptoms worsen/change/persist.  Discussed expected course/resolution/complications of diagnosis in detail with patient. Medication risks/benefits/costs/interactions/alternatives discussed with patient. She was given an after visit summary which includes diagnoses, current medications, & vitals. She expressed understanding with the diagnosis and plan.

## 2018-10-30 ENCOUNTER — TELEPHONE (OUTPATIENT)
Dept: INTERNAL MEDICINE CLINIC | Age: 28
End: 2018-10-30

## 2018-10-30 NOTE — TELEPHONE ENCOUNTER
On Call Note  Called by the patient from her vacation in [facility]. She reports having increased cough with some mild shortness of breath. She had gone to a pharmacy and had purchased prednisolone as well as azithromycin 500 mg tabs. She was calling for advice on how to administer these. She has been on this regimen before for what she feels was a flare of her asthma. I advised the patient to take 1 of the 500 mg azithromycin tablets and follow that by 1/2 tablet every day for the subsequent 5 days. Also instructed the patient on appropriate prednisolone taper. Advised that if she has significant worsening of symptoms, fever or shortness of breath that she should seek medical care immediately.   The patient endorsed understanding

## 2019-01-03 ENCOUNTER — OFFICE VISIT (OUTPATIENT)
Dept: INTERNAL MEDICINE CLINIC | Age: 29
End: 2019-01-03

## 2019-01-03 VITALS
OXYGEN SATURATION: 98 % | HEIGHT: 66 IN | WEIGHT: 122.6 LBS | BODY MASS INDEX: 19.7 KG/M2 | DIASTOLIC BLOOD PRESSURE: 74 MMHG | HEART RATE: 70 BPM | SYSTOLIC BLOOD PRESSURE: 110 MMHG | RESPIRATION RATE: 16 BRPM | TEMPERATURE: 98 F

## 2019-01-03 DIAGNOSIS — F90.0 ATTENTION DEFICIT HYPERACTIVITY DISORDER (ADHD), PREDOMINANTLY INATTENTIVE TYPE: Primary | ICD-10-CM

## 2019-01-03 DIAGNOSIS — F40.243 FEAR OF FLYING: ICD-10-CM

## 2019-01-03 DIAGNOSIS — J45.20 MILD INTERMITTENT ASTHMA WITHOUT COMPLICATION: ICD-10-CM

## 2019-01-03 RX ORDER — DEXTROAMPHETAMINE SACCHARATE, AMPHETAMINE ASPARTATE, DEXTROAMPHETAMINE SULFATE AND AMPHETAMINE SULFATE 5; 5; 5; 5 MG/1; MG/1; MG/1; MG/1
20 TABLET ORAL DAILY
Qty: 30 TAB | Refills: 0 | Status: SHIPPED | OUTPATIENT
Start: 2019-03-04 | End: 2019-03-14 | Stop reason: SDUPTHER

## 2019-01-03 RX ORDER — DEXTROAMPHETAMINE SACCHARATE, AMPHETAMINE ASPARTATE, DEXTROAMPHETAMINE SULFATE AND AMPHETAMINE SULFATE 5; 5; 5; 5 MG/1; MG/1; MG/1; MG/1
20 TABLET ORAL DAILY
Qty: 30 TAB | Refills: 0 | Status: SHIPPED | OUTPATIENT
Start: 2019-02-02 | End: 2019-03-14 | Stop reason: SDUPTHER

## 2019-01-03 RX ORDER — DEXTROAMPHETAMINE SACCHARATE, AMPHETAMINE ASPARTATE, DEXTROAMPHETAMINE SULFATE AND AMPHETAMINE SULFATE 5; 5; 5; 5 MG/1; MG/1; MG/1; MG/1
20 TABLET ORAL DAILY
Qty: 30 TAB | Refills: 0 | Status: SHIPPED | OUTPATIENT
Start: 2019-01-03 | End: 2019-03-14 | Stop reason: SDUPTHER

## 2019-01-03 RX ORDER — DEXTROAMPHETAMINE SACCHARATE, AMPHETAMINE ASPARTATE, DEXTROAMPHETAMINE SULFATE AND AMPHETAMINE SULFATE 5; 5; 5; 5 MG/1; MG/1; MG/1; MG/1
20 TABLET ORAL DAILY
Qty: 30 TAB | Refills: 0 | Status: CANCELLED | OUTPATIENT
Start: 2019-01-03

## 2019-01-03 NOTE — PROGRESS NOTES
Chief Complaint   Patient presents with    Medication Refill     Patient states she is here for medication refill.

## 2019-01-03 NOTE — PROGRESS NOTES
HPI:  Roma Bobby is a 33y.o. year old female who returns to clinic today for follow up:     1. ADD: Focus is controlled with current Adderall and uses primarily on work days. Medication does allow her to focus and complete task. No side effects. 2.  Anxiety situational:flight and flew recently but did not have to use her xanax. 3.  Asthma: exercise is a trigger seasonal allergies and cold. Having to use albuterol no more than 1 x a week during the cold and fall season and does not uses much during the warmer months. .   Current Outpatient Medications   Medication Sig Dispense Refill    dextroamphetamine-amphetamine (ADDERALL) 20 mg tablet Take 1 Tab (20 mg total) by mouth dailyEarliest Fill Date: 12/28/18. As needed Max Daily Amount: 20 mg 30 Tab 0    albuterol (PROAIR HFA) 90 mcg/actuation inhaler Take 2 Puffs by inhalation every four (4) hours as needed for Wheezing. 1 Inhaler 0    ALPRAZolam (XANAX) 0.5 mg tablet Take 1-2 tablets 1 hour prior flight and then may repeat every 6 hours as needed for anxiety. 10 Tab 0    fluticasone (FLONASE) 50 mcg/actuation nasal spray As needed SHAKE LIQUID AND USE 2 SPRAYS IN EACH NOSTRIL EVERY DAY 1 Bottle 5    beclomethasone (QVAR) 80 mcg/actuation aero Take 1 Puff by inhalation two (2) times a day. Rinse mouth after dosing 3 Inhaler 0    ibuprofen (MOTRIN) 600 mg tablet Take 1 Tab by mouth every six (6) hours as needed for Pain. 20 Tab 0     No Known Allergies  Social History     Tobacco Use    Smoking status: Never Smoker    Smokeless tobacco: Never Used   Substance Use Topics    Alcohol use: Yes     Alcohol/week: 1.0 oz     Types: 2 Glasses of wine per week         Review of Systems   Respiratory: Negative for shortness of breath. Cardiovascular: Negative for chest pain and leg swelling. Gastrointestinal: Negative for abdominal pain and heartburn. Physical Exam   Constitutional: She appears well-developed. No distress.    HENT:   Head: Normocephalic and atraumatic. Cardiovascular: Normal rate, regular rhythm and intact distal pulses. Pulmonary/Chest: Effort normal and breath sounds normal. She has no wheezes. Abdominal: Soft. Bowel sounds are normal. She exhibits no distension and no mass. There is no tenderness. Musculoskeletal: She exhibits no edema. Psychiatric: She has a normal mood and affect. Nursing note and vitals reviewed. Visit Vitals  /74 (BP 1 Location: Left arm, BP Patient Position: Sitting)   Pulse 70   Temp 98 °F (36.7 °C) (Oral)   Resp 16   Ht 5' 6.2\" (1.681 m)   Wt 122 lb 9.6 oz (55.6 kg)   LMP 12/19/2018 (Exact Date)   SpO2 98%   BMI 19.67 kg/m²       Assessment & Plan:    ICD-10-CM ICD-9-CM    1. Mild intermittent asthma without complication  Well-controlled continue current medication. J45.20 493.90    2. Attention deficit hyperactivity disorder (ADHD), predominantly inattentive type  Counseled regarding avoidance of Adderall in the setting of pregnancy. She is actively trying to get pregnant reviewed she should monitor her menstrual cycles and if she is late stop Adderall and check pregnancy test.  She states her agreement with this plan and does understand that Adderall should not be taken during pregnancy. F90.0 314.00 dextroamphetamine-amphetamine (ADDERALL) 20 mg tablet      dextroamphetamine-amphetamine (ADDERALL) 20 mg tablet      dextroamphetamine-amphetamine (ADDERALL) 20 mg tablet   3. Fear of flying: Xanax as needed     Follow-up Disposition:  Return in about 6 months (around 7/3/2019) for follow up. Advised her to call back or return to office if symptoms worsen/change/persist.  Discussed expected course/resolution/complications of diagnosis in detail with patient. Medication risks/benefits/costs/interactions/alternatives discussed with patient. She was given an after visit summary which includes diagnoses, current medications, & vitals. She expressed understanding with the diagnosis and plan.

## 2019-01-06 PROBLEM — F40.243 FEAR OF FLYING: Status: ACTIVE | Noted: 2019-01-06

## 2019-01-06 PROBLEM — F41.8 SITUATIONAL ANXIETY: Status: RESOLVED | Noted: 2018-08-14 | Resolved: 2019-01-06

## 2019-03-14 ENCOUNTER — TELEPHONE (OUTPATIENT)
Dept: INTERNAL MEDICINE CLINIC | Age: 29
End: 2019-03-14

## 2019-03-14 DIAGNOSIS — F90.0 ATTENTION DEFICIT HYPERACTIVITY DISORDER (ADHD), PREDOMINANTLY INATTENTIVE TYPE: ICD-10-CM

## 2019-03-14 RX ORDER — DEXTROAMPHETAMINE SACCHARATE, AMPHETAMINE ASPARTATE, DEXTROAMPHETAMINE SULFATE AND AMPHETAMINE SULFATE 5; 5; 5; 5 MG/1; MG/1; MG/1; MG/1
20 TABLET ORAL DAILY
Qty: 30 TAB | Refills: 0 | Status: SHIPPED | OUTPATIENT
Start: 2019-04-14 | End: 2021-10-05 | Stop reason: SDUPTHER

## 2019-03-14 RX ORDER — DEXTROAMPHETAMINE SACCHARATE, AMPHETAMINE ASPARTATE, DEXTROAMPHETAMINE SULFATE AND AMPHETAMINE SULFATE 5; 5; 5; 5 MG/1; MG/1; MG/1; MG/1
20 TABLET ORAL DAILY
Qty: 30 TAB | Refills: 0 | Status: SHIPPED | OUTPATIENT
Start: 2019-03-15 | End: 2019-04-13

## 2019-03-14 NOTE — TELEPHONE ENCOUNTER
Small, Maximino Pallas (Self) [phone] (H)     Pt says that dr rao gave her three rx for her adderall and that gave one to pharm, but have lost the others.

## 2019-08-29 ENCOUNTER — TELEPHONE (OUTPATIENT)
Dept: INTERNAL MEDICINE CLINIC | Age: 29
End: 2019-08-29

## 2019-08-29 ENCOUNTER — DOCUMENTATION ONLY (OUTPATIENT)
Dept: INTERNAL MEDICINE CLINIC | Age: 29
End: 2019-08-29

## 2019-08-29 NOTE — TELEPHONE ENCOUNTER
Small, Malachi Osgood (Self) [phone] (H)     Pt wants to let dr rao know that she is about 17 wks pregnant and has stopped taking adderall

## 2019-12-20 RX ORDER — ALBUTEROL SULFATE 90 UG/1
2 AEROSOL, METERED RESPIRATORY (INHALATION)
Qty: 1 INHALER | Refills: 0 | Status: SHIPPED | OUTPATIENT
Start: 2019-12-20 | End: 2020-09-24 | Stop reason: SDUPTHER

## 2020-09-10 ENCOUNTER — OFFICE VISIT (OUTPATIENT)
Dept: INTERNAL MEDICINE CLINIC | Age: 30
End: 2020-09-10
Payer: COMMERCIAL

## 2020-09-10 VITALS
TEMPERATURE: 98 F | OXYGEN SATURATION: 97 % | HEIGHT: 66 IN | HEART RATE: 80 BPM | RESPIRATION RATE: 16 BRPM | DIASTOLIC BLOOD PRESSURE: 72 MMHG | SYSTOLIC BLOOD PRESSURE: 103 MMHG | WEIGHT: 128.4 LBS | BODY MASS INDEX: 20.63 KG/M2

## 2020-09-10 DIAGNOSIS — J40 BRONCHITIS: Primary | ICD-10-CM

## 2020-09-10 PROCEDURE — 99213 OFFICE O/P EST LOW 20 MIN: CPT | Performed by: INTERNAL MEDICINE

## 2020-09-10 RX ORDER — AZITHROMYCIN 250 MG/1
TABLET, FILM COATED ORAL
Qty: 6 TAB | Refills: 0 | Status: SHIPPED | OUTPATIENT
Start: 2020-09-10 | End: 2020-09-24 | Stop reason: ALTCHOICE

## 2020-09-10 NOTE — PROGRESS NOTES
HPI:  Zina Aragon is a 31y.o. year old female who returns to clinic today for an acute visit: She reports 10 days of nasal congestion and a cough. Cough now productive of green sputum over the past week. Denies any shortness of breath wheezing fevers chills. She does have history of exercise-induced induced asthma as well as asthma triggered by seasonal allergies and colds. States has had to use her albuterol 1 time. Current Outpatient Medications   Medication Sig Dispense Refill    albuterol (PROAIR HFA) 90 mcg/actuation inhaler Take 2 Puffs by inhalation every four (4) hours as needed for Wheezing. 1 Inhaler 0    fluticasone (FLONASE) 50 mcg/actuation nasal spray As needed SHAKE LIQUID AND USE 2 SPRAYS IN EACH NOSTRIL EVERY DAY 1 Bottle 5     No Known Allergies  Social History     Tobacco Use    Smoking status: Never Smoker    Smokeless tobacco: Never Used   Substance Use Topics    Alcohol use: Yes     Alcohol/week: 1.7 standard drinks     Types: 2 Glasses of wine per week         Review of Systems   HENT: Negative for sore throat. Gastrointestinal: Negative for abdominal pain. Physical Exam  Vitals signs and nursing note reviewed. Constitutional:       General: She is not in acute distress. Appearance: She is well-developed. HENT:      Head: Normocephalic and atraumatic. Right Ear: Tympanic membrane and ear canal normal.      Left Ear: Tympanic membrane and ear canal normal.      Nose: Congestion and rhinorrhea present. Mouth/Throat:      Pharynx: No oropharyngeal exudate or posterior oropharyngeal erythema. Eyes:      Conjunctiva/sclera: Conjunctivae normal.   Cardiovascular:      Rate and Rhythm: Normal rate and regular rhythm. Pulmonary:      Effort: Pulmonary effort is normal.      Breath sounds: Normal breath sounds. No wheezing or rales. Abdominal:      General: Bowel sounds are normal.      Palpations: Abdomen is soft. Tenderness:  There is no abdominal tenderness. Lymphadenopathy:      Cervical: No cervical adenopathy. Assessment & Plan:    ICD-10-CM ICD-9-CM    1. Bronchitis  J40 490      Plan: Catalina, Mucinex DM, counseled should have COVID testing performed as cannot rule out that  infection. Orders Placed This Encounter    azithromycin (ZITHROMAX) 250 mg tablet         Advised her to call back or return to office if symptoms worsen/change/persist.  Discussed expected course/resolution/complications of diagnosis in detail with patient. Medication risks/benefits/costs/interactions/alternatives discussed with patient. She was given an after visit summary which includes diagnoses, current medications, & vitals. She expressed understanding with the diagnosis and plan.

## 2020-09-17 ENCOUNTER — TELEPHONE (OUTPATIENT)
Dept: INTERNAL MEDICINE CLINIC | Age: 30
End: 2020-09-17

## 2020-09-17 NOTE — TELEPHONE ENCOUNTER
Patient finished the zpack for bronchitis a couple of days ago. Woke up this morning and had bad pain in her right sinus. Had to take Tylenol for the pain. Please advise.

## 2020-09-17 NOTE — TELEPHONE ENCOUNTER
Notify patient that Z-Piotr is in her system for a full 10 days. She still has a good amount of antibiotic present. Recommend Mucinex and nasal saline washes for nasal congestion. Tylenol as needed for pain.

## 2020-09-24 ENCOUNTER — OFFICE VISIT (OUTPATIENT)
Dept: INTERNAL MEDICINE CLINIC | Age: 30
End: 2020-09-24
Payer: COMMERCIAL

## 2020-09-24 VITALS
BODY MASS INDEX: 20.09 KG/M2 | SYSTOLIC BLOOD PRESSURE: 111 MMHG | TEMPERATURE: 97.8 F | DIASTOLIC BLOOD PRESSURE: 72 MMHG | RESPIRATION RATE: 16 BRPM | HEIGHT: 67 IN | WEIGHT: 128 LBS | HEART RATE: 77 BPM | OXYGEN SATURATION: 98 %

## 2020-09-24 DIAGNOSIS — Z00.00 ROUTINE GENERAL MEDICAL EXAMINATION AT A HEALTH CARE FACILITY: Primary | ICD-10-CM

## 2020-09-24 DIAGNOSIS — J45.20 MILD INTERMITTENT ASTHMA WITHOUT COMPLICATION: ICD-10-CM

## 2020-09-24 PROCEDURE — 99395 PREV VISIT EST AGE 18-39: CPT | Performed by: INTERNAL MEDICINE

## 2020-09-24 RX ORDER — ALBUTEROL SULFATE 90 UG/1
2 AEROSOL, METERED RESPIRATORY (INHALATION)
Qty: 1 INHALER | Refills: 0 | Status: SHIPPED | OUTPATIENT
Start: 2020-09-24 | End: 2021-08-02 | Stop reason: SDUPTHER

## 2020-09-24 NOTE — PROGRESS NOTES
Subjective:   Tri Singh is here today for a full physical.    She had a baby girl 1/20. Health Maintenance  Immunizations:    Influenza: she will plan on getting it this fall. Tetanus: up to date. .   Cancer screening:    Cervical: reviewed guidelines, UTD, done by OBGYN, records requested. Exercise: resistance training and walking. Hx of asthma and rare use of albuterol and usually exercise induced. Patient Care Team:  Davon Brian MD as PCP - [facility] Sara Ayala MD as PCP - [facility] Empaneled Provider  Tramaine Mahoney MD (Obstetrics & Gynecology)     The following sections were reviewed & updated as appropriate: PMH, PSH, FH, and SH. Patient Active Problem List   Diagnosis Code    Asthma, exercise induced J45.990    Allergic rhinitis J30.9    Fear of flying F40.243          Prior to Admission medications    Medication Sig Start Date End Date Taking? Authorizing Provider   albuterol (PROAIR HFA) 90 mcg/actuation inhaler Take 2 Puffs by inhalation every four (4) hours as needed for Wheezing. 12/20/19  Yes Davon Brian MD   fluticasone (FLONASE) 50 mcg/actuation nasal spray As needed SHAKE LIQUID AND USE 2 SPRAYS IN EACH NOSTRIL EVERY DAY 3/27/18  Yes Davon Brian MD          No Known Allergies           Review of Systems   Constitutional: Negative for chills, fever and malaise/fatigue. HENT: Negative for congestion and sore throat. Respiratory: Negative for cough, shortness of breath and wheezing. Cardiovascular: Negative for chest pain, palpitations and leg swelling. Gastrointestinal: Negative for abdominal pain, blood in stool, constipation, diarrhea, heartburn, nausea and vomiting. Genitourinary: Negative for dysuria, frequency and urgency. Musculoskeletal: Negative for back pain, joint pain and myalgias. Skin: Negative for rash. Neurological: Negative for dizziness, sensory change, focal weakness and headaches. Psychiatric/Behavioral: Negative for depression.  The patient is not nervous/anxious and does not have insomnia. Objective:   Physical Exam  Vitals signs and nursing note reviewed. Constitutional:       General: She is not in acute distress. Appearance: She is well-developed. HENT:      Head: Normocephalic and atraumatic. Right Ear: Tympanic membrane and ear canal normal.      Left Ear: Tympanic membrane and ear canal normal.      Nose: Nose normal.   Eyes:      Conjunctiva/sclera: Conjunctivae normal.      Pupils: Pupils are equal, round, and reactive to light. Neck:      Musculoskeletal: Normal range of motion. Thyroid: No thyromegaly. Cardiovascular:      Rate and Rhythm: Normal rate and regular rhythm. Heart sounds: Normal heart sounds. No murmur. Pulmonary:      Effort: Pulmonary effort is normal.      Breath sounds: Normal breath sounds. Chest:      Comments: Breast exam: breasts appear normal, no suspicious masses, no skin or nipple changes or axillary nodes. Abdominal:      General: Bowel sounds are normal.      Palpations: Abdomen is soft. There is no mass. Tenderness: There is no abdominal tenderness. Musculoskeletal:      Right lower leg: No edema. Left lower leg: No edema. Lymphadenopathy:      Cervical: No cervical adenopathy. Skin:     Findings: No rash. Neurological:      Cranial Nerves: No cranial nerve deficit. Sensory: No sensory deficit. Psychiatric:         Mood and Affect: Mood normal.            Visit Vitals  /72 (BP 1 Location: Left arm, BP Patient Position: Sitting)   Pulse 77   Temp 97.8 °F (36.6 °C) (Temporal)   Resp 16   Ht 5' 6.69\" (1.694 m)   Wt 128 lb (58.1 kg)   LMP 09/05/2020   SpO2 98%   BMI 20.23 kg/m²         Niki Kelly is a 31 y.o. female who was seen in clinic today (9/24/2020) for a full physical.       Assessment & Plan:   Encounter Diagnoses   Name Primary?     Routine general medical examination at a health care facility  Health maintenance reviewed and updated with patient today at visit. Yes    Mild intermittent asthma without complication  Primarily exercise induced      Orders Placed This Encounter    CBC WITH AUTOMATED DIFF    LIPID PANEL    METABOLIC PANEL, COMPREHENSIVE    albuterol (ProAir HFA) 90 mcg/actuation inhaler   . Follow-up 1 year physical  Advised her to call back or return to office if symptoms worsen/change/persist.  Discussed expected course/resolution/complications of diagnosis in detail with patient. Medication risks/benefits/costs/interactions/alternatives discussed with patient. She was given an after visit summary which includes diagnoses, current medications, & vitals. She expressed understanding with the diagnosis and plan. Aspects of this note may have been generated using voice recognition software. Despite editing, there may be some syntax errors.        Mollie Osler, MD

## 2020-09-25 LAB
ALBUMIN SERPL-MCNC: 4.7 G/DL (ref 3.8–4.8)
ALBUMIN/GLOB SERPL: 1.9 {RATIO} (ref 1.2–2.2)
ALP SERPL-CCNC: 104 IU/L (ref 39–117)
ALT SERPL-CCNC: 14 IU/L (ref 0–32)
AST SERPL-CCNC: 14 IU/L (ref 0–40)
BASOPHILS # BLD AUTO: 0 X10E3/UL (ref 0–0.2)
BASOPHILS NFR BLD AUTO: 0 %
BILIRUB SERPL-MCNC: 0.5 MG/DL (ref 0–1.2)
BUN SERPL-MCNC: 8 MG/DL (ref 6–20)
BUN/CREAT SERPL: 13 (ref 9–23)
CALCIUM SERPL-MCNC: 9.3 MG/DL (ref 8.7–10.2)
CHLORIDE SERPL-SCNC: 101 MMOL/L (ref 96–106)
CHOLEST SERPL-MCNC: 161 MG/DL (ref 100–199)
CO2 SERPL-SCNC: 25 MMOL/L (ref 20–29)
CREAT SERPL-MCNC: 0.62 MG/DL (ref 0.57–1)
EOSINOPHIL # BLD AUTO: 0.3 X10E3/UL (ref 0–0.4)
EOSINOPHIL NFR BLD AUTO: 3 %
ERYTHROCYTE [DISTWIDTH] IN BLOOD BY AUTOMATED COUNT: 11.6 % (ref 11.7–15.4)
GLOBULIN SER CALC-MCNC: 2.5 G/DL (ref 1.5–4.5)
GLUCOSE SERPL-MCNC: 93 MG/DL (ref 65–99)
HCT VFR BLD AUTO: 41.8 % (ref 34–46.6)
HDLC SERPL-MCNC: 54 MG/DL
HGB BLD-MCNC: 14.1 G/DL (ref 11.1–15.9)
IMM GRANULOCYTES # BLD AUTO: 0 X10E3/UL (ref 0–0.1)
IMM GRANULOCYTES NFR BLD AUTO: 0 %
LDLC SERPL CALC-MCNC: 95 MG/DL (ref 0–99)
LYMPHOCYTES # BLD AUTO: 2.9 X10E3/UL (ref 0.7–3.1)
LYMPHOCYTES NFR BLD AUTO: 35 %
MCH RBC QN AUTO: 30.1 PG (ref 26.6–33)
MCHC RBC AUTO-ENTMCNC: 33.7 G/DL (ref 31.5–35.7)
MCV RBC AUTO: 89 FL (ref 79–97)
MONOCYTES # BLD AUTO: 0.5 X10E3/UL (ref 0.1–0.9)
MONOCYTES NFR BLD AUTO: 6 %
NEUTROPHILS # BLD AUTO: 4.5 X10E3/UL (ref 1.4–7)
NEUTROPHILS NFR BLD AUTO: 56 %
PLATELET # BLD AUTO: 298 X10E3/UL (ref 150–450)
POTASSIUM SERPL-SCNC: 4.3 MMOL/L (ref 3.5–5.2)
PROT SERPL-MCNC: 7.2 G/DL (ref 6–8.5)
RBC # BLD AUTO: 4.69 X10E6/UL (ref 3.77–5.28)
SODIUM SERPL-SCNC: 139 MMOL/L (ref 134–144)
TRIGL SERPL-MCNC: 57 MG/DL (ref 0–149)
VLDLC SERPL CALC-MCNC: 12 MG/DL (ref 5–40)
WBC # BLD AUTO: 8.2 X10E3/UL (ref 3.4–10.8)

## 2020-12-16 ENCOUNTER — VIRTUAL VISIT (OUTPATIENT)
Dept: INTERNAL MEDICINE CLINIC | Age: 30
End: 2020-12-16
Payer: COMMERCIAL

## 2020-12-16 DIAGNOSIS — J22 LOWER RESPIRATORY INFECTION (E.G., BRONCHITIS, PNEUMONIA, PNEUMONITIS, PULMONITIS): Primary | ICD-10-CM

## 2020-12-16 DIAGNOSIS — R05.9 COUGH: ICD-10-CM

## 2020-12-16 PROCEDURE — 99212 OFFICE O/P EST SF 10 MIN: CPT | Performed by: NURSE PRACTITIONER

## 2020-12-16 RX ORDER — AMOXICILLIN AND CLAVULANATE POTASSIUM 875; 125 MG/1; MG/1
1 TABLET, FILM COATED ORAL EVERY 12 HOURS
Qty: 14 TAB | Refills: 0 | Status: SHIPPED | OUTPATIENT
Start: 2020-12-16 | End: 2020-12-23

## 2020-12-16 NOTE — PROGRESS NOTES
Racquel Holman is a 31 y.o. female who was seen by synchronous (real-time) audio-video technology on 12/16/2020. Assessment & Plan:   Diagnoses and all orders for this visit:    1. Lower respiratory infection (e.g., bronchitis, pneumonia, pneumonitis, pulmonitis)    2. Cough    Other orders  -     amoxicillin-clavulanate (AUGMENTIN) 875-125 mg per tablet; Take 1 Tab by mouth every twelve (12) hours for 7 days. follow up with OB in 2 days if no improvement to discuss steroids    I spent at least 16 minutes on this visit with this established patient. ([id number])  Subjective:   Racquel Holman was seen for Cough    Patient reports ongoing productive cough x 1.5 weeks. She has had 2 rapid covid tests that were negative. She initially started with nasal congestion and cough, but now has progressed all to chest area. She is experiencing increased wheezing/tightness in the chest and has been using her inhaler more frequently the past 2 days. She is 14 weeks pregnant and does have history of asthma. Patient is followed by Dr. Bartholomew(OB/GYN). She was prescribed a z pack and finished that 3 days ago, but symptoms are not improving. No fever. She went for 2nd covid test today and the provider did listen to her lungs. They reported rhonchi and wheezing. Prior to Admission medications    Medication Sig Start Date End Date Taking? Authorizing Provider   amoxicillin-clavulanate (AUGMENTIN) 875-125 mg per tablet Take 1 Tab by mouth every twelve (12) hours for 7 days.  12/16/20 12/23/20 Yes Tez Gonzalez NP   albuterol (ProAir HFA) 90 mcg/actuation inhaler Take 2 Puffs by inhalation every four (4) hours as needed for Wheezing. 9/24/20   Nael Chiu MD   fluticasone (FLONASE) 50 mcg/actuation nasal spray As needed SHAKE LIQUID AND USE 2 SPRAYS IN [facility] NOSTRIL EVERY DAY 3/27/18   Nael Chiu MD       Patient Active Problem List   Diagnosis Code    Asthma, exercise induced J45.990    Allergic rhinitis J30.9    Fear of flying F29.039     Patient Active Problem List    Diagnosis Date Noted    Fear of flying 01/06/2019    Allergic rhinitis 06/18/2015    Asthma, exercise induced 09/16/2011     Current Outpatient Medications   Medication Sig Dispense Refill    amoxicillin-clavulanate (AUGMENTIN) 875-125 mg per tablet Take 1 Tab by mouth every twelve (12) hours for 7 days. 14 Tab 0    albuterol (ProAir HFA) 90 mcg/actuation inhaler Take 2 Puffs by inhalation every four (4) hours as needed for Wheezing. 1 Inhaler 0    fluticasone (FLONASE) 50 mcg/actuation nasal spray As needed SHAKE LIQUID AND USE 2 SPRAYS IN EACH NOSTRIL EVERY DAY 1 Bottle 5     No Known Allergies  Past Medical History:   Diagnosis Date    ADD (attention deficit disorder)     Allergic rhinitis, cause unspecified     Asthma     exercise induced    MRSA (methicillin resistant Staphylococcus aureus) carrier      Past Surgical History:   Procedure Laterality Date    HX ORTHOPAEDIC  5/2006    orif  left femur metal and 3 pins      Family History   Problem Relation Age of Onset    Breast Cancer Mother     Cancer Mother 54        breast    Coronary Artery Disease Father     Heart Disease Father         cad with stints    Heart Disease Paternal Grandfather         cad stints     Social History     Tobacco Use    Smoking status: Never Smoker    Smokeless tobacco: Never Used   Substance Use Topics    Alcohol use: Yes     Alcohol/week: 1.7 standard drinks     Types: 2 Glasses of wine per week       ROS - per HPI      Objective:   No flowsheet data found.   General: alert, cooperative, no distress   Mental  status: normal mood, behavior, speech, dress, motor activity, and thought processes, able to follow commands   Eyes: EOM intact, normal sclera   Mouth: mucous membranes moist   Neck: no visualized mass   Resp: normal effort, no respiratory distress and coughing - productive   Neuro: no gross deficits   Musculoskeletal: normal ROM of neck   Skin: no discoloration or lesions of concern on visible areas   Psychiatric: normal affect, no hallucinations             We discussed the expected course, resolution and complications of the diagnosis(es) in detail. Medication risks, benefits, costs, interactions, and alternatives were discussed as indicated. I advised her to contact the office if her condition worsens, changes or fails to improve as anticipated. She expressed understanding with the diagnosis(es) and plan. Saud Jiménez is a 31 y.o. female who was evaluated by a video visit encounter for concerns as above. Patient identification was verified prior to start of the visit. A caregiver was present when appropriate. Due to this being a TeleHealth encounter (During QZTZV-84 public health emergency), evaluation of the following organ systems was limited: Vitals/Constitutional/EENT/Resp/CV/GI//MS/Neuro/Skin/Heme-Lymph-Imm. Pursuant to the emergency declaration under the [facility], 1135 waiver authority and the [employer] Act, this Virtual  Visit was conducted, with patient's (and/or legal guardian's) consent, to reduce the patient's risk of exposure to COVID-19 and provide necessary medical care. Services were provided through a synchronous discussion virtually to substitute for in-person clinic visit. I was in the office. The patient was at home.     Clearance ERIBERTO Urbina

## 2021-08-02 RX ORDER — ALBUTEROL SULFATE 90 UG/1
2 AEROSOL, METERED RESPIRATORY (INHALATION)
Qty: 1 INHALER | Refills: 0 | Status: SHIPPED | OUTPATIENT
Start: 2021-08-02 | End: 2021-08-13 | Stop reason: SDUPTHER

## 2021-08-13 DIAGNOSIS — J45.990 ASTHMA, EXERCISE INDUCED: Primary | ICD-10-CM

## 2021-08-13 RX ORDER — ALBUTEROL SULFATE 90 UG/1
2 AEROSOL, METERED RESPIRATORY (INHALATION)
Qty: 1 INHALER | Refills: 0 | Status: SHIPPED | OUTPATIENT
Start: 2021-08-13 | End: 2022-08-31 | Stop reason: SDUPTHER

## 2021-09-22 ENCOUNTER — TELEPHONE (OUTPATIENT)
Dept: INTERNAL MEDICINE CLINIC | Age: 31
End: 2021-09-22

## 2021-10-05 ENCOUNTER — OFFICE VISIT (OUTPATIENT)
Dept: INTERNAL MEDICINE CLINIC | Age: 31
End: 2021-10-05
Payer: COMMERCIAL

## 2021-10-05 VITALS
BODY MASS INDEX: 21.41 KG/M2 | HEART RATE: 99 BPM | SYSTOLIC BLOOD PRESSURE: 102 MMHG | WEIGHT: 133.2 LBS | TEMPERATURE: 98 F | OXYGEN SATURATION: 100 % | RESPIRATION RATE: 16 BRPM | DIASTOLIC BLOOD PRESSURE: 68 MMHG | HEIGHT: 66 IN

## 2021-10-05 DIAGNOSIS — F41.8 SITUATIONAL ANXIETY: ICD-10-CM

## 2021-10-05 DIAGNOSIS — F90.0 ATTENTION DEFICIT HYPERACTIVITY DISORDER (ADHD), PREDOMINANTLY INATTENTIVE TYPE: ICD-10-CM

## 2021-10-05 DIAGNOSIS — Z00.00 ROUTINE GENERAL MEDICAL EXAMINATION AT A HEALTH CARE FACILITY: Primary | ICD-10-CM

## 2021-10-05 DIAGNOSIS — J45.20 MILD INTERMITTENT ASTHMA WITHOUT COMPLICATION: ICD-10-CM

## 2021-10-05 PROCEDURE — 99395 PREV VISIT EST AGE 18-39: CPT | Performed by: INTERNAL MEDICINE

## 2021-10-05 RX ORDER — DEXTROAMPHETAMINE SACCHARATE, AMPHETAMINE ASPARTATE, DEXTROAMPHETAMINE SULFATE AND AMPHETAMINE SULFATE 2.5; 2.5; 2.5; 2.5 MG/1; MG/1; MG/1; MG/1
10 TABLET ORAL DAILY
Qty: 30 TABLET | Refills: 0 | Status: SHIPPED | OUTPATIENT
Start: 2021-10-05 | End: 2021-11-04

## 2021-10-05 RX ORDER — IBUPROFEN 600 MG/1
600 TABLET ORAL
COMMUNITY
Start: 2021-06-10 | End: 2021-10-05

## 2021-10-05 RX ORDER — IBUPROFEN 600 MG/1
TABLET ORAL
COMMUNITY
End: 2022-10-06 | Stop reason: ALTCHOICE

## 2021-10-05 RX ORDER — ALPRAZOLAM 0.5 MG/1
TABLET ORAL
Qty: 10 TABLET | Refills: 0 | Status: SHIPPED | OUTPATIENT
Start: 2021-10-05

## 2021-10-05 NOTE — PROGRESS NOTES
Chief Complaint   Patient presents with    Complete Physical       1. Have you been to the ER, urgent care clinic since your last visit? Hospitalized since your last visit? No    2. Have you seen or consulted any other health care providers outside of the [address] [place] [address] since your last visit? Include any pap smears or colon screening.  No

## 2021-10-05 NOTE — PROGRESS NOTES
Roderick Dickson is a 27 y.o. female who was seen in clinic today (10/5/2021) for a full physical.       Assessment & Plan:   1. Routine general medical examination at a health care facility  -     CBC WITH AUTOMATED DIFF; Future  -     HEPATITIS C AB; Future  -     LIPID PANEL; Future  -     METABOLIC PANEL, COMPREHENSIVE; Future  -     TSH 3RD GENERATION; Future  2. Mild intermittent asthma without complication  Will use Qvar during URI to help with asthma flares. Albuterol prn.   3. Situational anxiety with flying  -     ALPRAZolam (XANAX) 0.5 mg tablet; Take 1-2 tablets 1 hour prior flight and then may repeat every 6 hours as needed for anxiety., Normal, Disp-10 Tablet, R-0  4. Attention deficit hyperactivity disorder (ADHD), predominantly inattentive type  Restart her ADD med as she is now requiring more focus for work.  reviewed. -     dextroamphetamine-amphetamine (ADDERALL) 10 mg tablet; Take 1 Tablet by mouth daily for 30 days. Max Daily Amount: 10 mg. As needed, Normal, Disp-30 Tablet, R-0      follow up 6 months and also schedule  1 year physical.     Subjective:   Sang Multani is here today for a full physical.      Since last visit: She is 3 1/2 months postpartum. Asthma: Rare use of albuterol. Has had some asthma flares associated with viral uri. Exercise: resistance and cardio. Health Maintenance  Immunizations:   Covid: up to date  Influenza: she will plan on getting it this fall   Tetanus: up to date        Cancer screening:   Cervical: reviewed guidelines, up to date  Breast: due at 35. The following sections were reviewed & updated as appropriate: Problem List, Allergies, PMH, PSH, FH, and SH. Prior to Admission medications    Medication Sig Start Date End Date Taking?  Authorizing Provider   ibuprofen (MOTRIN) 600 mg tablet 600 mg. 6/10/21 10/5/21 Yes Provider, Historical   albuterol (ProAir HFA) 90 mcg/actuation inhaler Take 2 Puffs by inhalation every four (4) hours as needed for Wheezing. 8/13/21  Yes Regan Rodriguez MD   fluticasone (FLONASE) 50 mcg/actuation nasal spray As needed SHAKE LIQUID AND USE 2 SPRAYS IN EACH NOSTRIL EVERY DAY 3/27/18  Yes Regan Rodriguez MD          Review of Systems   Constitutional: Negative for chills, fever and malaise/fatigue. HENT: Negative for congestion and sore throat. Eyes: Negative for blurred vision and double vision. Respiratory: Negative for cough, shortness of breath and wheezing. Cardiovascular: Negative for chest pain, palpitations and leg swelling. Gastrointestinal: Negative for abdominal pain, blood in stool, constipation, diarrhea, heartburn, nausea and vomiting. Genitourinary: Negative for dysuria, frequency and urgency. Musculoskeletal: Negative for back pain, joint pain and myalgias. Skin: Negative for rash. Neurological: Negative for dizziness, sensory change, focal weakness and headaches. Psychiatric/Behavioral: Negative for depression. The patient is nervous/anxious (when she flys. ). Objective:   Physical Exam  Vitals and nursing note reviewed. Constitutional:       General: She is not in acute distress. Appearance: She is well-developed. HENT:      Head: Normocephalic and atraumatic. Right Ear: Tympanic membrane and ear canal normal.      Left Ear: Tympanic membrane and ear canal normal.      Nose: Nose normal.   Eyes:      Conjunctiva/sclera: Conjunctivae normal.      Pupils: Pupils are equal, round, and reactive to light. Neck:      Thyroid: No thyromegaly. Cardiovascular:      Rate and Rhythm: Normal rate and regular rhythm. Heart sounds: Normal heart sounds. No murmur heard. Pulmonary:      Effort: Pulmonary effort is normal.      Breath sounds: Normal breath sounds. Chest:      Comments: Breast exam: breasts appear normal, no suspicious masses, no skin or nipple changes or axillary nodes.    Abdominal:      General: Bowel sounds are normal.      Palpations: Abdomen is soft. There is no mass. Tenderness: There is no abdominal tenderness. Musculoskeletal:      Cervical back: Normal range of motion. Right lower leg: No edema. Left lower leg: No edema. Lymphadenopathy:      Cervical: No cervical adenopathy. Skin:     Findings: No rash. Neurological:      Cranial Nerves: No cranial nerve deficit. Sensory: No sensory deficit.    Psychiatric:         Mood and Affect: Mood normal.            Visit Vitals  /68   Pulse 99   Temp 98 °F (36.7 °C) (Temporal)   Resp 16   Ht 5' 6\" (1.676 m)   Wt 133 lb 3.2 oz (60.4 kg)   LMP 10/01/2021   SpO2 100%   BMI 21.50 kg/m²          Dalton Jones MD

## 2022-02-22 DIAGNOSIS — F90.0 ATTENTION DEFICIT HYPERACTIVITY DISORDER (ADHD), PREDOMINANTLY INATTENTIVE TYPE: Primary | ICD-10-CM

## 2022-02-22 RX ORDER — DEXTROAMPHETAMINE SACCHARATE, AMPHETAMINE ASPARTATE, DEXTROAMPHETAMINE SULFATE AND AMPHETAMINE SULFATE 2.5; 2.5; 2.5; 2.5 MG/1; MG/1; MG/1; MG/1
10 TABLET ORAL
Qty: 30 TABLET | Refills: 0 | Status: SHIPPED | OUTPATIENT
Start: 2022-02-22 | End: 2022-08-30 | Stop reason: SDUPTHER

## 2022-02-22 NOTE — TELEPHONE ENCOUNTER
Chart reviewed. Started on medication by PCP at her last visit (10/5). Given 30 tabs. This is 1st refill request, 4+ months later. [facility]. Last visit with P[facility]  10/5/2021   Last refilled on: 10/6/21    No future appointments.      Last PDMP Serina Macdonald as Reviewed:  Review User Review Instant Review Result   ROBBY RAHMAN 2/22/2022  1:28 PM Reviewed PDMP [1]

## 2022-03-18 PROBLEM — F40.243 FEAR OF FLYING: Status: ACTIVE | Noted: 2019-01-06

## 2022-08-30 DIAGNOSIS — F90.0 ATTENTION DEFICIT HYPERACTIVITY DISORDER (ADHD), PREDOMINANTLY INATTENTIVE TYPE: ICD-10-CM

## 2022-08-31 DIAGNOSIS — J45.990 ASTHMA, EXERCISE INDUCED: ICD-10-CM

## 2022-08-31 RX ORDER — DEXTROAMPHETAMINE SACCHARATE, AMPHETAMINE ASPARTATE, DEXTROAMPHETAMINE SULFATE AND AMPHETAMINE SULFATE 2.5; 2.5; 2.5; 2.5 MG/1; MG/1; MG/1; MG/1
10 TABLET ORAL
Qty: 30 TABLET | Refills: 0 | Status: SHIPPED | OUTPATIENT
Start: 2022-08-31 | End: 2022-09-15 | Stop reason: SDUPTHER

## 2022-08-31 RX ORDER — ALBUTEROL SULFATE 90 UG/1
2 AEROSOL, METERED RESPIRATORY (INHALATION)
Qty: 1 EACH | Refills: 0 | Status: SHIPPED | OUTPATIENT
Start: 2022-08-31 | End: 2022-09-14

## 2022-09-14 DIAGNOSIS — J45.990 ASTHMA, EXERCISE INDUCED: ICD-10-CM

## 2022-09-14 RX ORDER — ALBUTEROL SULFATE 90 UG/1
AEROSOL, METERED RESPIRATORY (INHALATION)
Qty: 18 G | Refills: 0 | Status: SHIPPED | OUTPATIENT
Start: 2022-09-14 | End: 2022-11-02

## 2022-09-15 ENCOUNTER — VIRTUAL VISIT (OUTPATIENT)
Dept: INTERNAL MEDICINE CLINIC | Age: 32
End: 2022-09-15
Payer: COMMERCIAL

## 2022-09-15 DIAGNOSIS — F90.0 ATTENTION DEFICIT HYPERACTIVITY DISORDER (ADHD), PREDOMINANTLY INATTENTIVE TYPE: Primary | ICD-10-CM

## 2022-09-15 DIAGNOSIS — J45.990 ASTHMA, EXERCISE INDUCED: ICD-10-CM

## 2022-09-15 PROCEDURE — 99214 OFFICE O/P EST MOD 30 MIN: CPT | Performed by: INTERNAL MEDICINE

## 2022-09-15 RX ORDER — DEXTROAMPHETAMINE SACCHARATE, AMPHETAMINE ASPARTATE, DEXTROAMPHETAMINE SULFATE AND AMPHETAMINE SULFATE 2.5; 2.5; 2.5; 2.5 MG/1; MG/1; MG/1; MG/1
10 TABLET ORAL
Qty: 30 TABLET | Refills: 0 | Status: SHIPPED | OUTPATIENT
Start: 2022-09-30 | End: 2022-10-19 | Stop reason: SDUPTHER

## 2022-09-15 NOTE — ASSESSMENT & PLAN NOTE
well controlled, continue current medications, Continue with albuterol as needed and Qvar for flares of asthma.

## 2022-09-15 NOTE — PROGRESS NOTES
Marlena Torres is a 34 y.o. female who was seen by synchronous (real-time) audio-video technology on 9/15/2022. Assessment & Plan:   Below is the assessment and plan developed based on review of pertinent history, physical exam (if applicable), labs, studies, and medications. 1. Attention deficit hyperactivity disorder (ADHD), predominantly inattentive type  Assessment & Plan:   well controlled, continue current medications  Orders:  -     dextroamphetamine-amphetamine (ADDERALL) 10 mg tablet; Take 1 Tablet by mouth daily as needed (for ADHD symptoms). , Normal, Disp-30 Tablet, R-0  2. Asthma, exercise induced  Assessment & Plan:   well controlled, continue current medications, Continue with albuterol as needed and Qvar for flares of asthma. Keep upcoming physical appointment in October. Subjective:   Marlena Torres was seen for Other (ADHD) and Asthma  Taking medication with no side effects. Exercise: goes to gym 3 day. Diet eating  healthy diet. Rare use of albuterol and only uses Qvar as if she has an upper respiratory tract infection and develops asthma symptoms.  reviewed last fill of adderall was 8/31/22. Only taking Adderall when she works and medication does allow focus and completing task. She is currently working part-time. Hx of flight anxiety and has taken xanax in the past for this. Prior to Admission medications    Medication Sig Start Date End Date Taking? Authorizing Provider   albuterol (PROVENTIL HFA, VENTOLIN HFA, PROAIR HFA) 90 mcg/actuation inhaler INHALE 2 PUFFS BY MOUTH EVERY 4 HOURS AS NEEDED FOR WHEEZING 9/14/22  Yes Perla GRAYSON NP   dextroamphetamine-amphetamine (ADDERALL) 10 mg tablet Take 1 Tablet by mouth daily as needed (for ADHD symptoms).  8/31/22  Yes Ramiro Goodpasture, NP   Qvar RediHaler 40 mcg/actuation HFAb inhaler INHALE 1 PUFF BY MOUTH TWICE DAILY 2/8/22  Yes Edil Glaser MD   ibuprofen (MOTRIN) 600 mg tablet Take  by mouth every six (6) hours as needed for Pain. Yes Provider, Historical   ALPRAZolam (XANAX) 0.5 mg tablet Take 1-2 tablets 1 hour prior flight and then may repeat every 6 hours as needed for anxiety. 10/5/21  Yes Humberto Campbell MD   fluticasone (FLONASE) 50 mcg/actuation nasal spray As needed SHAKE LIQUID AND USE 2 SPRAYS IN EACH NOSTRIL EVERY DAY 3/27/18  Yes Humberto Campbell MD           Review of Systems   Respiratory:  Negative for shortness of breath. Cardiovascular:  Negative for chest pain and leg swelling. Gastrointestinal:  Negative for abdominal pain, diarrhea and nausea. Neurological:  Negative for headaches. Psychiatric/Behavioral:  The patient does not have insomnia. - per HPI      Objective:     Patient-Reported Vitals 9/15/2022   Patient-Reported Weight 128   Patient-Reported LMP 9/8/2022     General: alert, cooperative, no distress   Mental  status: normal mood, behavior, speech, dress, motor activity, and thought processes, able to follow commands   Eyes: EOM intact, normal sclera   Neck: no visualized mass   Resp: normal effort and no respiratory distress   Neuro: no gross deficits   Skin: no discoloration or lesions of concern on visible areas   Psychiatric: normal affect     Mallika Linares, was evaluated through a synchronous (real-time) audio-video encounter. The patient (or guardian if applicable) is aware that this is a billable service. Verbal consent to proceed has been obtained within the past 12 months. The visit was conducted pursuant to the emergency declaration under the [address] [place] [address] and the [employer] and [facility] [facility] General Act. Patient identification was verified, and a caregiver was present when appropriate. The patient was located in a state where the provider was credentialed to provide care.      Zachary Jordan MD

## 2022-10-06 ENCOUNTER — OFFICE VISIT (OUTPATIENT)
Dept: INTERNAL MEDICINE CLINIC | Age: 32
End: 2022-10-06
Payer: COMMERCIAL

## 2022-10-06 VITALS
HEART RATE: 70 BPM | WEIGHT: 125.6 LBS | SYSTOLIC BLOOD PRESSURE: 103 MMHG | HEIGHT: 67 IN | RESPIRATION RATE: 18 BRPM | TEMPERATURE: 97.8 F | DIASTOLIC BLOOD PRESSURE: 59 MMHG | BODY MASS INDEX: 19.71 KG/M2 | OXYGEN SATURATION: 99 %

## 2022-10-06 DIAGNOSIS — J45.990 ASTHMA, EXERCISE INDUCED: ICD-10-CM

## 2022-10-06 DIAGNOSIS — F40.243 FEAR OF FLYING: ICD-10-CM

## 2022-10-06 DIAGNOSIS — Z00.00 ROUTINE GENERAL MEDICAL EXAMINATION AT A HEALTH CARE FACILITY: Primary | ICD-10-CM

## 2022-10-06 DIAGNOSIS — F90.0 ATTENTION DEFICIT HYPERACTIVITY DISORDER (ADHD), PREDOMINANTLY INATTENTIVE TYPE: ICD-10-CM

## 2022-10-06 DIAGNOSIS — J30.1 SEASONAL ALLERGIC RHINITIS DUE TO POLLEN: ICD-10-CM

## 2022-10-06 PROCEDURE — 99395 PREV VISIT EST AGE 18-39: CPT | Performed by: INTERNAL MEDICINE

## 2022-10-06 RX ORDER — PNEUMOCOCCAL 20-VALENT CONJUGATE VACCINE 2.2; 2.2; 2.2; 2.2; 2.2; 2.2; 2.2; 2.2; 2.2; 2.2; 2.2; 2.2; 2.2; 2.2; 2.2; 2.2; 4.4; 2.2; 2.2; 2.2 UG/.5ML; UG/.5ML; UG/.5ML; UG/.5ML; UG/.5ML; UG/.5ML; UG/.5ML; UG/.5ML; UG/.5ML; UG/.5ML; UG/.5ML; UG/.5ML; UG/.5ML; UG/.5ML; UG/.5ML; UG/.5ML; UG/.5ML; UG/.5ML; UG/.5ML; UG/.5ML
0.5 INJECTION, SUSPENSION INTRAMUSCULAR ONCE
Qty: 0.5 ML | Refills: 0 | Status: SHIPPED | OUTPATIENT
Start: 2022-10-06 | End: 2022-10-06

## 2022-10-06 NOTE — ASSESSMENT & PLAN NOTE
Controlled with as needed albuterol when she exercises. She only uses Qvar if she has an asthma flare related to upper respiratory infection.

## 2022-10-06 NOTE — PROGRESS NOTES
Reji Louis is a 34 y.o. female who was seen in clinic today (10/6/2022) for a full physical.        Assessment & Plan:   Below is the assessment and plan developed based on review of pertinent history, physical exam, labs, studies, and medications. 1. Routine general medical examination at a health care facility  Health maintenance reviewed and updated with patient today at visit. Reviewed vaccine recommendations. -     CBC WITH AUTOMATED DIFF; Future  -     LIPID PANEL; Future  -     METABOLIC PANEL, COMPREHENSIVE; Future  -     TSH 3RD GENERATION; Future  2. Asthma, exercise induced  Assessment & Plan:   Controlled with as needed albuterol when she exercises. She only uses Qvar if she has an asthma flare related to upper respiratory infection. 3. Attention deficit hyperactivity disorder (ADHD), predominantly inattentive type  Assessment & Plan:   well controlled,  reviewed. Continue with Adderall. 4. Fear of flying  Assessment & Plan:  Controls with rare use of Xanax and does not feel she needs a refill on her Xanax prescription today. 5. Seasonal allergic rhinitis due to pollen  Assessment & Plan:   Controlled with as needed Flonase  Follow-up 6 months    Decline hep c test and reports will check with her gyn to see if has been done by them. She will let me know. Subjective:   Claudean Coder is here today for a full physical.      Since last visit: has been feeling well. Also here for follow up on ADHD medication. Medication allows for improved focus and completing task. She does not have to take it daily and takes pending what she needs to do that day.  reviewed and adderall last filled 8/31/22. Asthma: Controlled with as needed albuterol prior to exercise. Exercise: 3 x a week at gym cardio and resistance. Following a healthy diet.    Health Maintenance  Immunizations:   Covid: Recommend f fall booster  Influenza: Recommended   Tetanus: recommend fall boosterup to date      Cancer screening:   Cervical: reviewed guidelines,   UTD, done by OBGYN, records requested  Breast: reviewed guidelines, . Social:  and has 3year old and 2 year [place] and [place].  for wedding. The following sections were reviewed & updated as appropriate: Problem List, Allergies, PMH, PSH, FH, and SH. Prior to Admission medications    Medication Sig Start Date End Date Taking? Authorizing Provider   dextroamphetamine-amphetamine (ADDERALL) 10 mg tablet Take 1 Tablet by mouth daily as needed (for ADHD symptoms). 9/30/22  Yes Radha Jackson MD   albuterol (PROVENTIL HFA, VENTOLIN HFA, PROAIR HFA) 90 mcg/actuation inhaler INHALE 2 PUFFS BY MOUTH EVERY 4 HOURS AS NEEDED FOR WHEEZING 9/14/22  Yes Juhi Arizmendi NP   Qvar RediHaler 40 mcg/actuation HFAb inhaler INHALE 1 PUFF BY MOUTH TWICE DAILY 2/8/22  Yes Radha Jackson MD   ALPRAZolam Radha Close) 0.5 mg tablet Take 1-2 tablets 1 hour prior flight and then may repeat every 6 hours as needed for anxiety. 10/5/21  Yes Radha Jackson MD   fluticasone (FLONASE) 50 mcg/actuation nasal spray As needed SHAKE LIQUID AND USE 2 SPRAYS IN EACH NOSTRIL EVERY DAY 3/27/18  Yes Radha Jackson MD   ibuprofen (MOTRIN) 600 mg tablet Take  by mouth every six (6) hours as needed for Pain. 10/6/22  Provider, Historical          Review of Systems   Constitutional:  Negative for fever, malaise/fatigue and weight loss. HENT:  Negative for congestion and sore throat. Eyes: Negative. Respiratory:  Negative for cough and shortness of breath. Cardiovascular:  Negative for chest pain and leg swelling. Gastrointestinal:  Negative for abdominal pain, blood in stool, constipation, diarrhea, heartburn and nausea. Genitourinary:  Negative for dysuria, frequency and urgency. Musculoskeletal:  Negative for back pain and joint pain. Skin:  Negative for rash. Neurological:  Negative for dizziness, sensory change, focal weakness and headaches. Psychiatric/Behavioral:  Negative for depression. Objective:   Physical Exam  Vitals and nursing note reviewed. Constitutional:       General: She is not in acute distress. Appearance: She is well-developed. HENT:      Head: Normocephalic and atraumatic. Right Ear: Tympanic membrane and ear canal normal.      Left Ear: Tympanic membrane and ear canal normal.      Nose: Nose normal.      Mouth/Throat:      Mouth: Mucous membranes are moist.      Pharynx: No posterior oropharyngeal erythema. Eyes:      Extraocular Movements: Extraocular movements intact. Conjunctiva/sclera: Conjunctivae normal.      Pupils: Pupils are equal, round, and reactive to light. Neck:      Thyroid: No thyromegaly. Cardiovascular:      Rate and Rhythm: Normal rate and regular rhythm. Heart sounds: Normal heart sounds. No murmur heard. Pulmonary:      Effort: Pulmonary effort is normal.      Breath sounds: Normal breath sounds. Chest:      Comments: Breast exam: breasts appear normal, no suspicious masses, no skin or nipple changes or axillary nodes     Abdominal:      General: Bowel sounds are normal.      Palpations: Abdomen is soft. There is no mass. Tenderness: There is no abdominal tenderness. Musculoskeletal:      Cervical back: Normal range of motion. Right lower leg: No edema. Left lower leg: No edema. Lymphadenopathy:      Cervical: No cervical adenopathy. Skin:     Findings: No rash. Neurological:      Cranial Nerves: No cranial nerve deficit. Sensory: No sensory deficit.    Psychiatric:         Mood and Affect: Mood normal.          Visit Vitals  BP (!) 103/59   Pulse 70   Temp 97.8 °F (36.6 °C) (Temporal)   Resp 18   Ht 5' 7.36\" (1.711 m)   Wt 125 lb 9.6 oz (57 kg)   LMP 09/08/2022 (Approximate)   SpO2 99%   BMI 19.46 kg/m²          Herber López MD

## 2022-10-06 NOTE — ASSESSMENT & PLAN NOTE
Controls with rare use of Xanax and does not feel she needs a refill on her Xanax prescription today.

## 2022-10-18 ENCOUNTER — PATIENT MESSAGE (OUTPATIENT)
Dept: INTERNAL MEDICINE CLINIC | Age: 32
End: 2022-10-18

## 2022-10-19 DIAGNOSIS — F90.0 ATTENTION DEFICIT HYPERACTIVITY DISORDER (ADHD), PREDOMINANTLY INATTENTIVE TYPE: Primary | ICD-10-CM

## 2022-10-19 RX ORDER — DEXTROAMPHETAMINE SACCHARATE, AMPHETAMINE ASPARTATE, DEXTROAMPHETAMINE SULFATE AND AMPHETAMINE SULFATE 1.25; 1.25; 1.25; 1.25 MG/1; MG/1; MG/1; MG/1
10 TABLET ORAL
Qty: 60 TABLET | Refills: 0 | Status: SHIPPED | OUTPATIENT
Start: 2022-10-19

## 2022-10-19 NOTE — PROGRESS NOTES
[facility]  reviewed. Last visit with me:  10/6/2022   Last refilled on: 8/31/22. No future appointments. Patient requesting Adderall be changed to a different dosing as she cannot obtain the 10 mg tablet. We will send Adderall 5 mg 2 tablets every morning to her local pharmacy. We did call pharmacy and they do have this available.

## 2022-11-02 DIAGNOSIS — J45.990 ASTHMA, EXERCISE INDUCED: ICD-10-CM

## 2022-11-02 RX ORDER — ALBUTEROL SULFATE 90 UG/1
AEROSOL, METERED RESPIRATORY (INHALATION)
Qty: 18 G | Refills: 0 | Status: SHIPPED | OUTPATIENT
Start: 2022-11-02

## 2022-12-05 DIAGNOSIS — F90.0 ATTENTION DEFICIT HYPERACTIVITY DISORDER (ADHD), PREDOMINANTLY INATTENTIVE TYPE: ICD-10-CM

## 2022-12-06 DIAGNOSIS — F90.0 ATTENTION DEFICIT HYPERACTIVITY DISORDER (ADHD), PREDOMINANTLY INATTENTIVE TYPE: Primary | ICD-10-CM

## 2022-12-06 DIAGNOSIS — F90.0 ATTENTION DEFICIT HYPERACTIVITY DISORDER (ADHD), PREDOMINANTLY INATTENTIVE TYPE: ICD-10-CM

## 2022-12-06 RX ORDER — DEXTROAMPHETAMINE SACCHARATE, AMPHETAMINE ASPARTATE, DEXTROAMPHETAMINE SULFATE AND AMPHETAMINE SULFATE 1.25; 1.25; 1.25; 1.25 MG/1; MG/1; MG/1; MG/1
10 TABLET ORAL
Qty: 60 TABLET | Refills: 0 | Status: SHIPPED | OUTPATIENT
Start: 2022-12-06 | End: 2022-12-06 | Stop reason: SDUPTHER

## 2022-12-06 RX ORDER — DEXTROAMPHETAMINE SACCHARATE, AMPHETAMINE ASPARTATE, DEXTROAMPHETAMINE SULFATE AND AMPHETAMINE SULFATE 2.5; 2.5; 2.5; 2.5 MG/1; MG/1; MG/1; MG/1
10 TABLET ORAL DAILY
Qty: 30 TABLET | Refills: 0 | Status: SHIPPED | OUTPATIENT
Start: 2022-12-06

## 2022-12-06 NOTE — TELEPHONE ENCOUNTER
[facility] & [facility]  reviewed. Last visit with me:  10/6/2022   Last refilled on: 10/18/22    No future appointments.

## 2023-01-19 ENCOUNTER — PATIENT MESSAGE (OUTPATIENT)
Dept: INTERNAL MEDICINE CLINIC | Age: 33
End: 2023-01-19

## 2023-01-19 DIAGNOSIS — F90.0 ATTENTION DEFICIT HYPERACTIVITY DISORDER (ADHD), PREDOMINANTLY INATTENTIVE TYPE: ICD-10-CM

## 2023-01-19 NOTE — TELEPHONE ENCOUNTER
Chief Complaint   Patient presents with    Medication Refill     Last Appointment with Dr. Ofelia Lamas:  10/6/2022  No future appointments. [facility] sent to pt advising needs to schedule her 6 month follow upfor April. Posterior Splint

## 2023-01-20 RX ORDER — DEXTROAMPHETAMINE SACCHARATE, AMPHETAMINE ASPARTATE, DEXTROAMPHETAMINE SULFATE AND AMPHETAMINE SULFATE 2.5; 2.5; 2.5; 2.5 MG/1; MG/1; MG/1; MG/1
10 TABLET ORAL DAILY
Qty: 30 TABLET | Refills: 0 | Status: SHIPPED | OUTPATIENT
Start: 2023-01-20

## 2023-01-20 NOTE — TELEPHONE ENCOUNTER
[facility] & [facility]  reviewed.       Last visit with me:  10/6/2022   Last refilled on: 12/6/22    Future Appointments   Date Time Provider Phoebe Stock   4/6/2023  9:45 AM Kathy Caballero MD [facility]

## 2023-02-17 ENCOUNTER — PATIENT MESSAGE (OUTPATIENT)
Dept: INTERNAL MEDICINE CLINIC | Age: 33
End: 2023-02-17

## 2023-02-17 DIAGNOSIS — F41.8 SITUATIONAL ANXIETY: ICD-10-CM

## 2023-02-20 RX ORDER — ALPRAZOLAM 0.5 MG/1
TABLET ORAL
Qty: 10 TABLET | Refills: 0 | Status: SHIPPED | OUTPATIENT
Start: 2023-02-20

## 2023-02-20 NOTE — TELEPHONE ENCOUNTER
Orders Placed This Encounter    ALPRAZolam (XANAX) 0.5 mg tablet     Sig: Take 1-2 tablets 1 hour prior flight and then may repeat every 6 hours as needed for anxiety.      Dispense:  10 Tablet     Refill:  0        reviewed 2/20/2023

## 2023-02-20 NOTE — TELEPHONE ENCOUNTER
From: Allan Slade  To: Luz Marina Funez MD  Sent: 2/17/2023 6:22 PM EST  Subject: Upcoming flight/med    Good evening! I just sent over a refill request for my xanax med for before going on flights. We are flying to [facility] next week and I think it would be a good idea to take some with me in case I need it! Thank you.

## 2023-03-20 ENCOUNTER — HOSPITAL ENCOUNTER (EMERGENCY)
Age: 33
Discharge: HOME | End: 2023-03-20
Attending: EMERGENCY MEDICINE
Payer: COMMERCIAL

## 2023-03-20 ENCOUNTER — APPOINTMENT (OUTPATIENT)
Dept: ULTRASOUND IMAGING | Age: 33
End: 2023-03-20
Attending: EMERGENCY MEDICINE
Payer: COMMERCIAL

## 2023-03-20 VITALS
TEMPERATURE: 99.7 F | HEIGHT: 66 IN | SYSTOLIC BLOOD PRESSURE: 117 MMHG | RESPIRATION RATE: 18 BRPM | BODY MASS INDEX: 20.89 KG/M2 | DIASTOLIC BLOOD PRESSURE: 53 MMHG | OXYGEN SATURATION: 96 % | WEIGHT: 130 LBS | HEART RATE: 108 BPM

## 2023-03-20 DIAGNOSIS — B34.9 VIRAL SYNDROME: ICD-10-CM

## 2023-03-20 DIAGNOSIS — O03.9 SPONTANEOUS ABORTION: Primary | ICD-10-CM

## 2023-03-20 LAB
ALBUMIN SERPL-MCNC: 3.4 G/DL (ref 3.5–5)
ALBUMIN/GLOB SERPL: 0.9 (ref 1.1–2.2)
ALP SERPL-CCNC: 72 U/L (ref 45–117)
ALT SERPL-CCNC: 29 U/L (ref 12–78)
ANION GAP SERPL CALC-SCNC: 7 MMOL/L (ref 5–15)
APPEARANCE UR: CLEAR
AST SERPL-CCNC: 18 U/L (ref 15–37)
BACTERIA URNS QL MICRO: NEGATIVE /HPF
BASOPHILS # BLD: 0 K/UL (ref 0–0.1)
BASOPHILS NFR BLD: 0 % (ref 0–1)
BILIRUB SERPL-MCNC: 0.4 MG/DL (ref 0.2–1)
BILIRUB UR QL: NEGATIVE
BUN SERPL-MCNC: 10 MG/DL (ref 6–20)
BUN/CREAT SERPL: 13 (ref 12–20)
CALCIUM SERPL-MCNC: 8.3 MG/DL (ref 8.5–10.1)
CHLORIDE SERPL-SCNC: 105 MMOL/L (ref 97–108)
CO2 SERPL-SCNC: 25 MMOL/L (ref 21–32)
COLOR UR: ABNORMAL
COMMENT, HOLDF: NORMAL
CREAT SERPL-MCNC: 0.75 MG/DL (ref 0.55–1.02)
DIFFERENTIAL METHOD BLD: ABNORMAL
EOSINOPHIL # BLD: 0 K/UL (ref 0–0.4)
EOSINOPHIL NFR BLD: 0 % (ref 0–7)
EPITH CASTS URNS QL MICRO: ABNORMAL /LPF
ERYTHROCYTE [DISTWIDTH] IN BLOOD BY AUTOMATED COUNT: 11.8 % (ref 11.5–14.5)
GLOBULIN SER CALC-MCNC: 3.7 G/DL (ref 2–4)
GLUCOSE SERPL-MCNC: 126 MG/DL (ref 65–100)
GLUCOSE UR STRIP.AUTO-MCNC: NEGATIVE MG/DL
HCG SERPL-ACNC: 552 MIU/ML (ref 0–6)
HCT VFR BLD AUTO: 41.3 % (ref 35–47)
HGB BLD-MCNC: 13.9 G/DL (ref 11.5–16)
HGB UR QL STRIP: ABNORMAL
HYALINE CASTS URNS QL MICRO: ABNORMAL /LPF (ref 0–2)
IMM GRANULOCYTES # BLD AUTO: 0 K/UL (ref 0–0.04)
IMM GRANULOCYTES NFR BLD AUTO: 0 % (ref 0–0.5)
KETONES UR QL STRIP.AUTO: NEGATIVE MG/DL
LEUKOCYTE ESTERASE UR QL STRIP.AUTO: ABNORMAL
LIPASE SERPL-CCNC: 109 U/L (ref 73–393)
LYMPHOCYTES # BLD: 0.9 K/UL (ref 0.8–3.5)
LYMPHOCYTES NFR BLD: 9 % (ref 12–49)
MCH RBC QN AUTO: 30.6 PG (ref 26–34)
MCHC RBC AUTO-ENTMCNC: 33.7 G/DL (ref 30–36.5)
MCV RBC AUTO: 91 FL (ref 80–99)
MONOCYTES # BLD: 0.3 K/UL (ref 0–1)
MONOCYTES NFR BLD: 3 % (ref 5–13)
NEUTS SEG # BLD: 8.8 K/UL (ref 1.8–8)
NEUTS SEG NFR BLD: 88 % (ref 32–75)
NITRITE UR QL STRIP.AUTO: NEGATIVE
NRBC # BLD: 0 K/UL (ref 0–0.01)
NRBC BLD-RTO: 0 PER 100 WBC
PH UR STRIP: 5.5 (ref 5–8)
PLATELET # BLD AUTO: 228 K/UL (ref 150–400)
PMV BLD AUTO: 9.5 FL (ref 8.9–12.9)
POTASSIUM SERPL-SCNC: 3.5 MMOL/L (ref 3.5–5.1)
PROT SERPL-MCNC: 7.1 G/DL (ref 6.4–8.2)
PROT UR STRIP-MCNC: NEGATIVE MG/DL
RBC # BLD AUTO: 4.54 M/UL (ref 3.8–5.2)
RBC #/AREA URNS HPF: ABNORMAL /HPF (ref 0–5)
SAMPLES BEING HELD,HOLD: NORMAL
SODIUM SERPL-SCNC: 137 MMOL/L (ref 136–145)
SP GR UR REFRACTOMETRY: 1.01 (ref 1–1.03)
UR CULT HOLD, URHOLD: NORMAL
UROBILINOGEN UR QL STRIP.AUTO: 0.2 EU/DL (ref 0.2–1)
WBC # BLD AUTO: 10.1 K/UL (ref 3.6–11)
WBC URNS QL MICRO: ABNORMAL /HPF (ref 0–4)

## 2023-03-20 PROCEDURE — 87077 CULTURE AEROBIC IDENTIFY: CPT

## 2023-03-20 PROCEDURE — 81001 URINALYSIS AUTO W/SCOPE: CPT

## 2023-03-20 PROCEDURE — 74011250636 HC RX REV CODE- 250/636: Performed by: EMERGENCY MEDICINE

## 2023-03-20 PROCEDURE — 96360 HYDRATION IV INFUSION INIT: CPT

## 2023-03-20 PROCEDURE — 36415 COLL VENOUS BLD VENIPUNCTURE: CPT

## 2023-03-20 PROCEDURE — 87186 SC STD MICRODIL/AGAR DIL: CPT

## 2023-03-20 PROCEDURE — 87086 URINE CULTURE/COLONY COUNT: CPT

## 2023-03-20 PROCEDURE — 83690 ASSAY OF LIPASE: CPT

## 2023-03-20 PROCEDURE — 85025 COMPLETE CBC W/AUTO DIFF WBC: CPT

## 2023-03-20 PROCEDURE — 84702 CHORIONIC GONADOTROPIN TEST: CPT

## 2023-03-20 PROCEDURE — 76817 TRANSVAGINAL US OBSTETRIC: CPT

## 2023-03-20 PROCEDURE — 99284 EMERGENCY DEPT VISIT MOD MDM: CPT

## 2023-03-20 PROCEDURE — 80053 COMPREHEN METABOLIC PANEL: CPT

## 2023-03-20 RX ORDER — ONDANSETRON 4 MG/1
4 TABLET, ORALLY DISINTEGRATING ORAL
Qty: 12 TABLET | Refills: 0 | Status: SHIPPED | OUTPATIENT
Start: 2023-03-20

## 2023-03-20 RX ORDER — IBUPROFEN 600 MG/1
600 TABLET ORAL
Qty: 20 TABLET | Refills: 0 | Status: SHIPPED | OUTPATIENT
Start: 2023-03-20

## 2023-03-20 RX ADMIN — SODIUM CHLORIDE, POTASSIUM CHLORIDE, SODIUM LACTATE AND CALCIUM CHLORIDE 1000 ML: 600; 310; 30; 20 INJECTION, SOLUTION INTRAVENOUS at 19:21

## 2023-03-20 NOTE — ED PROVIDER NOTES
33F w/ hx asthma p/w body aches x1d. Pt is 6wks pregnant and had a spontaneous miscarriage 3d ago. Follows w/ [facility] and states had nonviable IUP last US. Started having pelvic cramping and bleeding w/ clot 3d ago. Still having small amount of bleeding but has significantly decreased. Today started feeling body aches, fatigue, N/V/D and at home reports having had a fever 104 measured temporally. No cough, dyspnea, urinary symptoms, rash or leg swelling. Still having mild pelvic pain. No travel or sick contacts. Past Medical History:   Diagnosis Date    ADD (attention deficit disorder)     Allergic rhinitis, cause unspecified     Asthma     exercise induced    MRSA (methicillin resistant Staphylococcus aureus) carrier        Past Surgical History:   Procedure Laterality Date    HX ORTHOPAEDIC  5/2006    orif  left femur metal and 3 pins          Family History:   Problem Relation Age of Onset    Breast Cancer Mother     Cancer Mother 54        breast    Coronary Art Dis Father     Heart Disease Father         cad with stints    Heart Disease Paternal Grandfather         cad stints       Social History     Socioeconomic History    Marital status: SINGLE     Spouse name: Not on file    Number of children: Not on file    Years of education: Not on file    Highest education level: Not on file   Occupational History    Not on file   Tobacco Use    Smoking status: Never    Smokeless tobacco: Never   Vaping Use    Vaping Use: Never used   Substance and Sexual Activity    Alcohol use:  Yes     Alcohol/week: 2.0 standard drinks     Types: 2 Glasses of wine per week    Drug use: No    Sexual activity: Yes     Partners: Male     Birth control/protection: Condom, None   Other Topics Concern    Not on file   Social History Narrative    Not on file     Social Determinants of Health     Financial Resource Strain: Not on file   Food Insecurity: Not on file   Transportation Needs: Not on file   Physical Activity: Not on file Stress: Not on file   Social Connections: Not on file   Intimate Partner Violence: Not on file   Housing Stability: Not on file         ALLERGIES: Patient has no known allergies. Review of Systems   Constitutional:  Positive for chills, fatigue and fever. Negative for diaphoresis. HENT:  Negative for facial swelling, mouth sores, nosebleeds, trouble swallowing and voice change. Eyes:  Negative for pain and visual disturbance. Respiratory:  Negative for apnea, cough, choking, shortness of breath, wheezing and stridor. Cardiovascular:  Negative for chest pain, palpitations and leg swelling. Gastrointestinal:  Positive for diarrhea, nausea and vomiting. Negative for abdominal distention, abdominal pain and blood in stool. Genitourinary:  Positive for pelvic pain. Negative for difficulty urinating, dysuria, flank pain and hematuria. Musculoskeletal:  Negative for joint swelling. Skin:  Negative for color change and rash. Allergic/Immunologic: Negative for immunocompromised state. Neurological:  Negative for dizziness, seizures, syncope, speech difficulty and light-headedness. Hematological:  Does not bruise/bleed easily. Psychiatric/Behavioral:  Negative for agitation and behavioral problems. Vitals:    03/20/23 1713   BP: (!) 117/53   Pulse: (!) 126   Resp: 18   Temp: 99.7 °F (37.6 °C)   SpO2: 96%   Weight: 59 kg (130 lb)   Height: 5' 6\" (1.676 m)            Physical Exam  Vitals and nursing note reviewed. Constitutional:       General: She is not in acute distress. Appearance: Normal appearance. She is not ill-appearing or toxic-appearing. HENT:      Head: Normocephalic and atraumatic. Right Ear: External ear normal.      Left Ear: External ear normal.      Nose: Nose normal.      Mouth/Throat:      Mouth: Mucous membranes are moist.      Pharynx: Oropharynx is clear. No oropharyngeal exudate or posterior oropharyngeal erythema.    Eyes:      General: No scleral icterus. Extraocular Movements: Extraocular movements intact. Conjunctiva/sclera: Conjunctivae normal.      Pupils: Pupils are equal, round, and reactive to light. Cardiovascular:      Rate and Rhythm: Regular rhythm. Tachycardia present. Pulses: Normal pulses. Heart sounds: Normal heart sounds. No murmur heard. No friction rub. No gallop. Pulmonary:      Effort: Pulmonary effort is normal. No respiratory distress. Breath sounds: Normal breath sounds. No stridor. No wheezing, rhonchi or rales. Abdominal:      General: There is no distension. Palpations: Abdomen is soft. Tenderness: There is no abdominal tenderness. There is no guarding or rebound. Musculoskeletal:         General: No tenderness or deformity. Normal range of motion. Cervical back: Normal range of motion and neck supple. No rigidity. Right lower leg: No edema. Left lower leg: No edema. Skin:     General: Skin is warm. Capillary Refill: Capillary refill takes less than 2 seconds. Coloration: Skin is not jaundiced. Neurological:      General: No focal deficit present. Mental Status: She is alert. Cranial Nerves: No cranial nerve deficit. Sensory: No sensory deficit. Motor: No weakness. Coordination: Coordination normal.   Psychiatric:         Mood and Affect: Mood normal.         Behavior: Behavior normal.         Thought Content: Thought content normal.         Judgment: Judgment normal.      I personally reviewed and independently interpreted EKG, labs and imaging results.     LABORATORY TESTS:  Recent Results (from the past 24 hour(s))   CBC WITH AUTOMATED DIFF    Collection Time: 03/20/23  7:16 PM   Result Value Ref Range    WBC 10.1 3.6 - 11.0 K/uL    RBC 4.54 3.80 - 5.20 M/uL    HGB 13.9 11.5 - 16.0 g/dL    HCT 41.3 35.0 - 47.0 %    MCV 91.0 80.0 - 99.0 FL    MCH 30.6 26.0 - 34.0 PG    MCHC 33.7 30.0 - 36.5 g/dL    RDW 11.8 11.5 - 14.5 %    PLATELET 228 150 - 400 K/uL    MPV 9.5 8.9 - 12.9 FL    NRBC 0.0 0  WBC    ABSOLUTE NRBC 0.00 0.00 - 0.01 K/uL    NEUTROPHILS 88 (H) 32 - 75 %    LYMPHOCYTES 9 (L) 12 - 49 %    MONOCYTES 3 (L) 5 - 13 %    EOSINOPHILS 0 0 - 7 %    BASOPHILS 0 0 - 1 %    IMMATURE GRANULOCYTES 0 0.0 - 0.5 %    ABS. NEUTROPHILS 8.8 (H) 1.8 - 8.0 K/UL    ABS. LYMPHOCYTES 0.9 0.8 - 3.5 K/UL    ABS. MONOCYTES 0.3 0.0 - 1.0 K/UL    ABS. EOSINOPHILS 0.0 0.0 - 0.4 K/UL    ABS. BASOPHILS 0.0 0.0 - 0.1 K/UL    ABS. IMM. GRANS. 0.0 0.00 - 0.04 K/UL    DF AUTOMATED     METABOLIC PANEL, COMPREHENSIVE    Collection Time: 03/20/23  7:16 PM   Result Value Ref Range    Sodium 137 136 - 145 mmol/L    Potassium 3.5 3.5 - 5.1 mmol/L    Chloride 105 97 - 108 mmol/L    CO2 25 21 - 32 mmol/L    Anion gap 7 5 - 15 mmol/L    Glucose 126 (H) 65 - 100 mg/dL    BUN 10 6 - 20 MG/DL    Creatinine 0.75 0.55 - 1.02 MG/DL    BUN/Creatinine ratio 13 12 - 20      eGFR >60 >60 ml/min/1.73m2    Calcium 8.3 (L) 8.5 - 10.1 MG/DL    Bilirubin, total 0.4 0.2 - 1.0 MG/DL    ALT (SGPT) 29 12 - 78 U/L    AST (SGOT) 18 15 - 37 U/L    Alk. phosphatase 72 45 - 117 U/L    Protein, total 7.1 6.4 - 8.2 g/dL    Albumin 3.4 (L) 3.5 - 5.0 g/dL    Globulin 3.7 2.0 - 4.0 g/dL    A-G Ratio 0.9 (L) 1.1 - 2.2     LIPASE    Collection Time: 03/20/23  7:16 PM   Result Value Ref Range    Lipase 109 73 - 393 U/L   BETA HCG, QT    Collection Time: 03/20/23  7:16 PM   Result Value Ref Range    Beta HCG,  (H) 0 - 6 MIU/ML   SAMPLES BEING HELD    Collection Time: 03/20/23  7:16 PM   Result Value Ref Range    SAMPLES BEING HELD NO SAMPLE RECEIVED      COMMENT        Add-on orders for these samples will be processed based on acceptable specimen integrity and analyte stability, which may vary by analyte.    URINALYSIS W/MICROSCOPIC    Collection Time: 03/20/23  7:19 PM   Result Value Ref Range    Color YELLOW/STRAW      Appearance CLEAR CLEAR      Specific gravity 1.006 1.003 - 1.030      pH (UA) 5.5 5.0 - 8.0      Protein Negative NEG mg/dL    Glucose Negative NEG mg/dL    Ketone Negative NEG mg/dL    Bilirubin Negative NEG      Blood MODERATE (A) NEG      Urobilinogen 0.2 0.2 - 1.0 EU/dL    Nitrites Negative NEG      Leukocyte Esterase TRACE (A) NEG      WBC 0-4 0 - 4 /hpf    RBC 5-10 0 - 5 /hpf    Epithelial cells FEW FEW /lpf    Bacteria Negative NEG /hpf    Hyaline cast 0-2 0 - 2 /lpf   URINE CULTURE HOLD SAMPLE    Collection Time: 23  7:19 PM    Specimen: Urine   Result Value Ref Range    Urine culture hold        Urine on hold in Microbiology dept for 2 days. If unpreserved urine is submitted, it cannot be used for addtional testing after 24 hours, recollection will be required. IMAGING RESULTS:  US UTS TRANSVAGINAL OB   Final Result   There is no intrauterine gestation identified. No adnexal masses are identified. Correlation with beta hCG. MEDICATIONS GIVEN:  Medications   lactated ringers bolus infusion 1,000 mL (0 mL IntraVENous IV Completed 3/20/23 2042)       IMPRESSION:  1. Spontaneous     2. Viral syndrome        PLAN:  - Discharge    Dejon Garcia MD      Medical Decision Making  33F w/ hx asthma p/w body aches, fatigue, N/V/D x1d. Pt is 6wks pregnant and had a spontaneous miscarriage 3d ago. Pt well appearing, tachy but stable BP w/o resp distress or hypoxia. Benign abd exam. Labs unremarkable including stable Hgb and nl LFTs/lipase. UA +blood which is expected but not suggestive of infection. . Pelvic US neg for IUP, adnexal masses or free fluid. Pt given IVF LR which improved HR to 100s from 120s. Low concern for acute abd process or intra-abd sepsis. Possibly nonspecific viral syndrome or symptoms related to miscarriage. Explained to pt that needs to follow up w/ OBGYN to trend down HCG.     Patient given specific return precautions and explained signs/symptoms for which to come back to ED immediately but otherwise advised to f/u w/ PCP over next 2-3days. Amount and/or Complexity of Data Reviewed  Labs: ordered. Decision-making details documented in ED Course. Radiology: ordered and independent interpretation performed. Decision-making details documented in ED Course. ECG/medicine tests: ordered and independent interpretation performed. Decision-making details documented in ED Course. Risk  Prescription drug management.            Procedures

## 2023-03-20 NOTE — ED TRIAGE NOTES
Miscarriage on Friday and pt is still having some bleeding. Pt has had a fever that started today at 102.5 at home. Pt has not taken anything at home for the fever. Pt has had nausea, vomiting and diarrhea. Pt has had 2-3 episodes of vomiting and diarrhea.

## 2023-03-23 LAB
BACTERIA SPEC CULT: ABNORMAL
CC UR VC: ABNORMAL
SERVICE CMNT-IMP: ABNORMAL

## 2023-03-23 NOTE — PROGRESS NOTES
Pt returned call. Pt feeling improved. No fever. No dysuria, frequency or other urinary symptoms. Urine is clean catch per patient not cath per report.   Pt declined antibiotic

## 2023-05-15 ENCOUNTER — TELEPHONE (OUTPATIENT)
Age: 33
End: 2023-05-15

## 2023-05-15 NOTE — TELEPHONE ENCOUNTER
Reason for call:  pt is leaving to go out of the country tomorrow morning,  her kids woke up with a respiratory issues yesterday. Pt has asthma and wants to know if something can be called in for her to take with her in case she get sick while on vacation. [place] Torrez [phone]. Please call and advise.       Is this a new problem: yes,     Date of last appointment:  4/11/2023     Can we respond via [email or web address]: No    Best call back number:    Uriel jennifer (Self) [phone] (Home)

## 2023-05-16 NOTE — TELEPHONE ENCOUNTER
Refill sent on qvar and she can start that med prior to her trip to make sure asthma well controlled. Not appropriate to send prednisone.

## 2023-05-22 DIAGNOSIS — F90.0 ATTENTION DEFICIT HYPERACTIVITY DISORDER (ADHD), PREDOMINANTLY INATTENTIVE TYPE: Primary | ICD-10-CM

## 2023-05-22 RX ORDER — DEXTROAMPHETAMINE SACCHARATE, AMPHETAMINE ASPARTATE, DEXTROAMPHETAMINE SULFATE AND AMPHETAMINE SULFATE 2.5; 2.5; 2.5; 2.5 MG/1; MG/1; MG/1; MG/1
TABLET ORAL DAILY
Qty: 30 TABLET | Status: CANCELLED | OUTPATIENT
Start: 2023-05-22

## 2023-05-23 DIAGNOSIS — F90.0 ATTENTION DEFICIT HYPERACTIVITY DISORDER (ADHD), PREDOMINANTLY INATTENTIVE TYPE: Primary | ICD-10-CM

## 2023-05-23 RX ORDER — DEXTROAMPHETAMINE SACCHARATE, AMPHETAMINE ASPARTATE, DEXTROAMPHETAMINE SULFATE AND AMPHETAMINE SULFATE 2.5; 2.5; 2.5; 2.5 MG/1; MG/1; MG/1; MG/1
10 TABLET ORAL DAILY
Qty: 30 TABLET | Refills: 0 | Status: SHIPPED | OUTPATIENT
Start: 2023-05-23 | End: 2023-06-22

## 2023-05-23 RX ORDER — DEXTROAMPHETAMINE SACCHARATE, AMPHETAMINE ASPARTATE, DEXTROAMPHETAMINE SULFATE AND AMPHETAMINE SULFATE 2.5; 2.5; 2.5; 2.5 MG/1; MG/1; MG/1; MG/1
10 TABLET ORAL DAILY
Qty: 30 TABLET | Refills: 0 | Status: SHIPPED | OUTPATIENT
Start: 2023-06-22 | End: 2023-07-22

## 2023-05-23 NOTE — TELEPHONE ENCOUNTER
Medication Refill Request    Chari Dubin is requesting a refill of the following medication(s):     amphetamine-dextroamphetamine (ADDERALL) 10 MG tablet        Please send refill to:     [facility] [place] [facility] [phone]  [facility]  Phone: [phone] Fax: [phone]       Pt states she has 1 or 2 tablets left.

## 2023-07-10 LAB
ABO, EXTERNAL RESULT: NORMAL
C. TRACHOMATIS, EXTERNAL RESULT: NEGATIVE
HEP B, EXTERNAL RESULT: NEGATIVE
HEPATITIS C ANTIBODY, EXTERNAL RESULT: NEGATIVE
HIV, EXTERNAL RESULT: NEGATIVE
N. GONORRHOEAE, EXTERNAL RESULT: NEGATIVE
RH FACTOR, EXTERNAL RESULT: NEGATIVE
RPR, EXTERNAL RESULT: NORMAL
RUBELLA TITER, EXTERNAL RESULT: NORMAL

## 2023-07-13 ENCOUNTER — TELEPHONE (OUTPATIENT)
Age: 33
End: 2023-07-13

## 2023-07-13 NOTE — TELEPHONE ENCOUNTER
Medication Refill Request    Antonetta Heimlich is requesting a refill of the following medication(s):   amphetamine-dextroamphetamine (ADDERALL) 10 MG tablet            Please send refill to:     [address] [place] [phone]  [address] [facility] [address]  Phone: [phone] Fax: [phone]

## 2023-07-13 NOTE — TELEPHONE ENCOUNTER
Spoke with patient, she will check with pharmacy. Unsure if picked up Oksana script. She will contact office if refill needed.

## 2024-01-31 LAB — GBS, EXTERNAL RESULT: NEGATIVE

## 2024-02-16 ENCOUNTER — HOSPITAL ENCOUNTER (INPATIENT)
Facility: HOSPITAL | Age: 34
LOS: 1 days | Discharge: HOME | End: 2024-02-17
Attending: OBSTETRICS & GYNECOLOGY | Admitting: OBSTETRICS & GYNECOLOGY
Payer: COMMERCIAL

## 2024-02-16 ENCOUNTER — ANESTHESIA EVENT (OUTPATIENT)
Facility: HOSPITAL | Age: 34
End: 2024-02-16
Payer: COMMERCIAL

## 2024-02-16 ENCOUNTER — ANESTHESIA (OUTPATIENT)
Facility: HOSPITAL | Age: 34
End: 2024-02-16
Payer: COMMERCIAL

## 2024-02-16 PROBLEM — Z37.9 NORMAL LABOR: Status: ACTIVE | Noted: 2024-02-16

## 2024-02-16 LAB
ERYTHROCYTE [DISTWIDTH] IN BLOOD BY AUTOMATED COUNT: 13.1 % (ref 11.5–14.5)
HCT VFR BLD AUTO: 36 % (ref 35–47)
HGB BLD-MCNC: 12 G/DL (ref 11.5–16)
MCH RBC QN AUTO: 27.8 PG (ref 26–34)
MCHC RBC AUTO-ENTMCNC: 33.3 G/DL (ref 30–36.5)
MCV RBC AUTO: 83.5 FL (ref 80–99)
NRBC # BLD: 0 K/UL (ref 0–0.01)
NRBC BLD-RTO: 0 PER 100 WBC
PLATELET # BLD AUTO: 202 K/UL (ref 150–400)
PMV BLD AUTO: 11.6 FL (ref 8.9–12.9)
RBC # BLD AUTO: 4.31 M/UL (ref 3.8–5.2)
WBC # BLD AUTO: 14.5 K/UL (ref 3.6–11)

## 2024-02-16 PROCEDURE — 51702 INSERT TEMP BLADDER CATH: CPT

## 2024-02-16 PROCEDURE — 00HU33Z INSERTION OF INFUSION DEVICE INTO SPINAL CANAL, PERCUTANEOUS APPROACH: ICD-10-PCS | Performed by: NURSE ANESTHETIST, CERTIFIED REGISTERED

## 2024-02-16 PROCEDURE — 86900 BLOOD TYPING SEROLOGIC ABO: CPT

## 2024-02-16 PROCEDURE — 10907ZC DRAINAGE OF AMNIOTIC FLUID, THERAPEUTIC FROM PRODUCTS OF CONCEPTION, VIA NATURAL OR ARTIFICIAL OPENING: ICD-10-PCS | Performed by: OBSTETRICS & GYNECOLOGY

## 2024-02-16 PROCEDURE — 3700000156 HC EPIDURAL ANESTHESIA: Performed by: NURSE ANESTHETIST, CERTIFIED REGISTERED

## 2024-02-16 PROCEDURE — 86921 COMPATIBILITY TEST INCUBATE: CPT

## 2024-02-16 PROCEDURE — 2500000003 HC RX 250 WO HCPCS: Performed by: ANESTHESIOLOGY

## 2024-02-16 PROCEDURE — 6360000002 HC RX W HCPCS: Performed by: OBSTETRICS & GYNECOLOGY

## 2024-02-16 PROCEDURE — 86901 BLOOD TYPING SEROLOGIC RH(D): CPT

## 2024-02-16 PROCEDURE — 0UQMXZZ REPAIR VULVA, EXTERNAL APPROACH: ICD-10-PCS | Performed by: OBSTETRICS & GYNECOLOGY

## 2024-02-16 PROCEDURE — 85027 COMPLETE CBC AUTOMATED: CPT

## 2024-02-16 PROCEDURE — 2500000003 HC RX 250 WO HCPCS: Performed by: NURSE ANESTHETIST, CERTIFIED REGISTERED

## 2024-02-16 PROCEDURE — 86850 RBC ANTIBODY SCREEN: CPT

## 2024-02-16 PROCEDURE — 6370000000 HC RX 637 (ALT 250 FOR IP): Performed by: OBSTETRICS & GYNECOLOGY

## 2024-02-16 PROCEDURE — 7220000101 HC DELIVERY VAGINAL/SINGLE: Performed by: OBSTETRICS & GYNECOLOGY

## 2024-02-16 PROCEDURE — 1120000000 HC RM PRIVATE OB

## 2024-02-16 PROCEDURE — 86920 COMPATIBILITY TEST SPIN: CPT

## 2024-02-16 PROCEDURE — 86870 RBC ANTIBODY IDENTIFICATION: CPT

## 2024-02-16 PROCEDURE — 36415 COLL VENOUS BLD VENIPUNCTURE: CPT

## 2024-02-16 PROCEDURE — 7210000100 HC LABOR FEE PER 1 HR: Performed by: OBSTETRICS & GYNECOLOGY

## 2024-02-16 PROCEDURE — 2580000003 HC RX 258: Performed by: OBSTETRICS & GYNECOLOGY

## 2024-02-16 PROCEDURE — 86922 COMPATIBILITY TEST ANTIGLOB: CPT

## 2024-02-16 PROCEDURE — 6360000002 HC RX W HCPCS: Performed by: NURSE ANESTHETIST, CERTIFIED REGISTERED

## 2024-02-16 PROCEDURE — 86780 TREPONEMA PALLIDUM: CPT

## 2024-02-16 RX ORDER — ONDANSETRON 2 MG/ML
4 INJECTION INTRAMUSCULAR; INTRAVENOUS EVERY 6 HOURS PRN
Status: DISCONTINUED | OUTPATIENT
Start: 2024-02-16 | End: 2024-02-16

## 2024-02-16 RX ORDER — FERROUS SULFATE 325(65) MG
325 TABLET ORAL 2 TIMES DAILY WITH MEALS
Status: DISCONTINUED | OUTPATIENT
Start: 2024-02-16 | End: 2024-02-17 | Stop reason: HOSPADM

## 2024-02-16 RX ORDER — SODIUM CHLORIDE 9 MG/ML
25 INJECTION, SOLUTION INTRAVENOUS PRN
Status: DISCONTINUED | OUTPATIENT
Start: 2024-02-16 | End: 2024-02-16

## 2024-02-16 RX ORDER — METHYLERGONOVINE MALEATE 0.2 MG/ML
200 INJECTION INTRAVENOUS PRN
Status: DISCONTINUED | OUTPATIENT
Start: 2024-02-16 | End: 2024-02-17 | Stop reason: HOSPADM

## 2024-02-16 RX ORDER — LIDOCAINE HYDROCHLORIDE 10 MG/ML
30 INJECTION, SOLUTION EPIDURAL; INFILTRATION; INTRACAUDAL; PERINEURAL PRN
Status: DISCONTINUED | OUTPATIENT
Start: 2024-02-16 | End: 2024-02-16

## 2024-02-16 RX ORDER — DOCUSATE SODIUM 100 MG/1
100 CAPSULE, LIQUID FILLED ORAL 2 TIMES DAILY
Status: DISCONTINUED | OUTPATIENT
Start: 2024-02-16 | End: 2024-02-16

## 2024-02-16 RX ORDER — OXYCODONE HYDROCHLORIDE 5 MG/1
5 TABLET ORAL EVERY 4 HOURS PRN
Status: DISCONTINUED | OUTPATIENT
Start: 2024-02-16 | End: 2024-02-17 | Stop reason: HOSPADM

## 2024-02-16 RX ORDER — CARBOPROST TROMETHAMINE 250 UG/ML
250 INJECTION, SOLUTION INTRAMUSCULAR PRN
Status: DISCONTINUED | OUTPATIENT
Start: 2024-02-16 | End: 2024-02-16

## 2024-02-16 RX ORDER — SEVOFLURANE 250 ML/250ML
1 LIQUID RESPIRATORY (INHALATION) CONTINUOUS PRN
Status: DISCONTINUED | OUTPATIENT
Start: 2024-02-16 | End: 2024-02-16

## 2024-02-16 RX ORDER — SODIUM CHLORIDE 0.9 % (FLUSH) 0.9 %
5-40 SYRINGE (ML) INJECTION PRN
Status: DISCONTINUED | OUTPATIENT
Start: 2024-02-16 | End: 2024-02-17 | Stop reason: HOSPADM

## 2024-02-16 RX ORDER — SODIUM CHLORIDE, SODIUM LACTATE, POTASSIUM CHLORIDE, CALCIUM CHLORIDE 600; 310; 30; 20 MG/100ML; MG/100ML; MG/100ML; MG/100ML
INJECTION, SOLUTION INTRAVENOUS CONTINUOUS
Status: DISCONTINUED | OUTPATIENT
Start: 2024-02-16 | End: 2024-02-16

## 2024-02-16 RX ORDER — DOCUSATE SODIUM 100 MG/1
100 CAPSULE, LIQUID FILLED ORAL 2 TIMES DAILY
Status: DISCONTINUED | OUTPATIENT
Start: 2024-02-16 | End: 2024-02-17 | Stop reason: HOSPADM

## 2024-02-16 RX ORDER — METHYLERGONOVINE MALEATE 0.2 MG/ML
200 INJECTION INTRAVENOUS PRN
Status: DISCONTINUED | OUTPATIENT
Start: 2024-02-16 | End: 2024-02-16

## 2024-02-16 RX ORDER — NALOXONE HYDROCHLORIDE 0.4 MG/ML
INJECTION, SOLUTION INTRAMUSCULAR; INTRAVENOUS; SUBCUTANEOUS PRN
Status: DISCONTINUED | OUTPATIENT
Start: 2024-02-16 | End: 2024-02-16

## 2024-02-16 RX ORDER — TRANEXAMIC ACID 10 MG/ML
1000 INJECTION, SOLUTION INTRAVENOUS
Status: ACTIVE | OUTPATIENT
Start: 2024-02-16 | End: 2024-02-17

## 2024-02-16 RX ORDER — ACETAMINOPHEN 500 MG
1000 TABLET ORAL EVERY 6 HOURS PRN
Status: DISCONTINUED | OUTPATIENT
Start: 2024-02-16 | End: 2024-02-16

## 2024-02-16 RX ORDER — MISOPROSTOL 200 UG/1
800 TABLET ORAL PRN
Status: DISCONTINUED | OUTPATIENT
Start: 2024-02-16 | End: 2024-02-16

## 2024-02-16 RX ORDER — BISACODYL 10 MG
10 SUPPOSITORY, RECTAL RECTAL DAILY PRN
Status: DISCONTINUED | OUTPATIENT
Start: 2024-02-16 | End: 2024-02-17 | Stop reason: HOSPADM

## 2024-02-16 RX ORDER — DIPHENHYDRAMINE HYDROCHLORIDE 50 MG/ML
25 INJECTION INTRAMUSCULAR; INTRAVENOUS EVERY 4 HOURS PRN
Status: DISCONTINUED | OUTPATIENT
Start: 2024-02-16 | End: 2024-02-16

## 2024-02-16 RX ORDER — ACETAMINOPHEN 500 MG
1000 TABLET ORAL EVERY 6 HOURS PRN
Status: DISCONTINUED | OUTPATIENT
Start: 2024-02-16 | End: 2024-02-17 | Stop reason: HOSPADM

## 2024-02-16 RX ORDER — SODIUM CHLORIDE 9 MG/ML
INJECTION, SOLUTION INTRAVENOUS PRN
Status: DISCONTINUED | OUTPATIENT
Start: 2024-02-16 | End: 2024-02-17 | Stop reason: HOSPADM

## 2024-02-16 RX ORDER — LANOLIN/MINERAL OIL
LOTION (ML) TOPICAL PRN
Status: DISCONTINUED | OUTPATIENT
Start: 2024-02-16 | End: 2024-02-17 | Stop reason: HOSPADM

## 2024-02-16 RX ORDER — SODIUM CHLORIDE 0.9 % (FLUSH) 0.9 %
5-40 SYRINGE (ML) INJECTION EVERY 12 HOURS SCHEDULED
Status: DISCONTINUED | OUTPATIENT
Start: 2024-02-16 | End: 2024-02-17 | Stop reason: HOSPADM

## 2024-02-16 RX ORDER — IBUPROFEN 800 MG/1
800 TABLET ORAL EVERY 8 HOURS SCHEDULED
Status: DISCONTINUED | OUTPATIENT
Start: 2024-02-16 | End: 2024-02-17 | Stop reason: HOSPADM

## 2024-02-16 RX ORDER — SODIUM CHLORIDE, SODIUM LACTATE, POTASSIUM CHLORIDE, AND CALCIUM CHLORIDE .6; .31; .03; .02 G/100ML; G/100ML; G/100ML; G/100ML
500 INJECTION, SOLUTION INTRAVENOUS PRN
Status: DISCONTINUED | OUTPATIENT
Start: 2024-02-16 | End: 2024-02-16

## 2024-02-16 RX ORDER — TERBUTALINE SULFATE 1 MG/ML
0.25 INJECTION, SOLUTION SUBCUTANEOUS
Status: DISCONTINUED | OUTPATIENT
Start: 2024-02-16 | End: 2024-02-16

## 2024-02-16 RX ORDER — SODIUM CHLORIDE 0.9 % (FLUSH) 0.9 %
5-40 SYRINGE (ML) INJECTION EVERY 12 HOURS SCHEDULED
Status: DISCONTINUED | OUTPATIENT
Start: 2024-02-16 | End: 2024-02-16

## 2024-02-16 RX ORDER — OXYCODONE HYDROCHLORIDE 5 MG/1
10 TABLET ORAL EVERY 4 HOURS PRN
Status: DISCONTINUED | OUTPATIENT
Start: 2024-02-16 | End: 2024-02-17 | Stop reason: HOSPADM

## 2024-02-16 RX ORDER — SODIUM CHLORIDE, SODIUM LACTATE, POTASSIUM CHLORIDE, AND CALCIUM CHLORIDE .6; .31; .03; .02 G/100ML; G/100ML; G/100ML; G/100ML
1000 INJECTION, SOLUTION INTRAVENOUS PRN
Status: DISCONTINUED | OUTPATIENT
Start: 2024-02-16 | End: 2024-02-16

## 2024-02-16 RX ORDER — SIMETHICONE 80 MG
80 TABLET,CHEWABLE ORAL EVERY 6 HOURS PRN
Status: DISCONTINUED | OUTPATIENT
Start: 2024-02-16 | End: 2024-02-17 | Stop reason: HOSPADM

## 2024-02-16 RX ORDER — HYDROMORPHONE HYDROCHLORIDE 1 MG/ML
1 INJECTION, SOLUTION INTRAMUSCULAR; INTRAVENOUS; SUBCUTANEOUS EVERY 4 HOURS PRN
Status: DISCONTINUED | OUTPATIENT
Start: 2024-02-16 | End: 2024-02-16

## 2024-02-16 RX ORDER — ONDANSETRON 2 MG/ML
4 INJECTION INTRAMUSCULAR; INTRAVENOUS EVERY 6 HOURS PRN
Status: DISCONTINUED | OUTPATIENT
Start: 2024-02-16 | End: 2024-02-17 | Stop reason: HOSPADM

## 2024-02-16 RX ORDER — BUPIVACAINE HYDROCHLORIDE 2.5 MG/ML
INJECTION, SOLUTION EPIDURAL; INFILTRATION; INTRACAUDAL PRN
Status: DISCONTINUED | OUTPATIENT
Start: 2024-02-16 | End: 2024-02-16 | Stop reason: SDUPTHER

## 2024-02-16 RX ORDER — FENTANYL/BUPIVACAINE/NS/PF 2-1250MCG
10 PLASTIC BAG, INJECTION (ML) INJECTION CONTINUOUS
Status: DISCONTINUED | OUTPATIENT
Start: 2024-02-16 | End: 2024-02-16

## 2024-02-16 RX ORDER — SODIUM CHLORIDE 0.9 % (FLUSH) 0.9 %
5-40 SYRINGE (ML) INJECTION PRN
Status: DISCONTINUED | OUTPATIENT
Start: 2024-02-16 | End: 2024-02-16

## 2024-02-16 RX ORDER — MISOPROSTOL 200 UG/1
800 TABLET ORAL PRN
Status: DISCONTINUED | OUTPATIENT
Start: 2024-02-16 | End: 2024-02-17 | Stop reason: HOSPADM

## 2024-02-16 RX ORDER — TRANEXAMIC ACID 10 MG/ML
1000 INJECTION, SOLUTION INTRAVENOUS
Status: DISCONTINUED | OUTPATIENT
Start: 2024-02-16 | End: 2024-02-16

## 2024-02-16 RX ADMIN — SODIUM CHLORIDE, POTASSIUM CHLORIDE, SODIUM LACTATE AND CALCIUM CHLORIDE: 600; 310; 30; 20 INJECTION, SOLUTION INTRAVENOUS at 13:28

## 2024-02-16 RX ADMIN — LIDOCAINE HYDROCHLORIDE 3 ML: 10; .005 INJECTION, SOLUTION EPIDURAL; INFILTRATION; INTRACAUDAL; PERINEURAL at 13:37

## 2024-02-16 RX ADMIN — SODIUM CHLORIDE, POTASSIUM CHLORIDE, SODIUM LACTATE AND CALCIUM CHLORIDE: 600; 310; 30; 20 INJECTION, SOLUTION INTRAVENOUS at 12:32

## 2024-02-16 RX ADMIN — OXYTOCIN 999 MILLI-UNITS/MIN: 10 INJECTION, SOLUTION INTRAMUSCULAR; INTRAVENOUS at 16:34

## 2024-02-16 RX ADMIN — Medication 10 ML/HR: at 14:35

## 2024-02-16 RX ADMIN — FERROUS SULFATE TAB 325 MG (65 MG ELEMENTAL FE) 325 MG: 325 (65 FE) TAB at 17:06

## 2024-02-16 RX ADMIN — SODIUM CHLORIDE, POTASSIUM CHLORIDE, SODIUM LACTATE AND CALCIUM CHLORIDE: 600; 310; 30; 20 INJECTION, SOLUTION INTRAVENOUS at 14:29

## 2024-02-16 RX ADMIN — BUPIVACAINE HYDROCHLORIDE 1 ML: 2.5 INJECTION, SOLUTION EPIDURAL; INFILTRATION; INTRACAUDAL; PERINEURAL at 13:36

## 2024-02-16 NOTE — L&D DELIVERY NOTE
Delivery Note:     Patient reached FD and pushed with good effort to deliver the fetal head in OA position.  1 nuchal cord found and easily reduced at perineum.  The anterior shoulder, followed by the posterior shoulder and the rest of the body then delivered easily.  This was a GIRL with Apgars of 8 and 9 at 1 and 5 minutes respectively, weight pending.  The infant was placed on mom's abdomen.  The cord was then double clamped and cut by the FOB.  Cord blood was taken.  The placenta followed spontaneously, intact, with 3VC.  Pitocin was added to the IVF and the fundus was firm to palpation.  The vagina, cervix and perineum were examined and left periurethral laceration was found and repaired to hemostasis with 3-0 vicryl suture.   mL.  No complications.  Mom and baby doing well.  Dr. Davey delivering.     Tessa Davey DO, FACOG  [facility]         Small, Female Nasra [[id number]]      Labor Events     Labor: No   Steroids: None  Cervical Ripening Date/Time:      Antibiotics Received during Labor: No  Rupture Identifier: Sac 1  Rupture Date/Time:  24 14:35:00   Rupture Type: AROM  Fluid Color: Clear  Fluid Odor: None  Fluid Volume: Moderate  Induction: None  Augmentation: None  Labor Complications: None              Anesthesia    Method: None       Labor Event Times      Labor onset date/time:        Dilation complete date/time:  24 13:20:00     Start pushing date/time:  2024 16:20:00   Decision date/time (emergent ):            Delivery Details      Delivery Date: 24 Delivery Time: 16:25:00   Delivery Type: Vaginal, Spontaneous               Presentation    Presentation: Vertex  Position: Left  _: Occiput  _: Anterior       Shoulder Dystocia    Shoulder Dystocia Present?: No       Assisted Delivery Details    Forceps Attempted?: No  Vacuum Extractor Attempted?: No                           Cord    Vessels: 3  Vessels  Complications: Nuchal Loose  Cord Around: Head  Delayed Cord Clamping?: Yes  Cord Clamped Date/Time: 2024 16:26:00  Cord Blood Disposition: Lab  Gases Sent?: No              Placenta    Date/Time: 2024 16:34:52  Removal: Expressed  Appearance: Intact  Disposition: Discarded       Lacerations    Episiotomy: None  Perineal Lacerations: None  Other Lacerations: periurethral laceration  Periurethral Laceration: Left Repaired?: Yes   Number of Repair Packets: 1       Vaginal Counts    Initial Count Personnel: DANIELE GUNN RN  Initial Count Verified By: DR. BLACKBURN  Intial Sponge Count: Correct Intial Needles Count: Correct Intial Instruments Count: Correct   Final Sponges Count: Correct Final Needles  Count: Correct Final Instruments Count: Correct   Final Count Personnel: DANIELE GUNN RN  Final Count Verified By: DR. BLACKBURN  Accurate Final Count?: Yes       Blood Loss  Mother: Nasra Monae #[id number]     Start of Mother's Information      Delivery Blood Loss  24 0425 - 24 1639      None                 End of Mother's Information  Mother: Nasra Monae #[id number]                Delivery Providers    Delivering clinician: Tessa Blackburn DO     Provider Role    Tessa Blakcburn DO Obstetrician    Daniella Gunn RN Primary Nurse    Renetta Gibbs RN Primary [place] Nurse    Mariana Shah RN Charge Nurse              Ossian Assessment    Living Status: Living        Skin Color:   Heart Rate:   Reflex Irritability:   Muscle Tone:   Respiratory Effort:   Total:            1 Minute:    0    2    2    2    2    8         5 Minute:    1    2    2    2    2    9                                        Apgars Assigned By: DAREN GIBBS RN              Resuscitation    Method: Bulb Suction, Room Air, Stimulation             Ossian Measurements               Skin to Skin      Skin to Skin Initiation Date/Time: 24 16:25:00 EST     Skin to Skin With: Mother

## 2024-02-16 NOTE — PROGRESS NOTES
1156 - Patient arrived to unit from office laboring; SVE in office, patient was 6 cm. Patient requesting epidural. Bolus started.     1259 - Patient reporting intense pressure. SVE by this RN, patient 9 cm with a BBOW. Dr. Davey notified.    1320 - Dr. Davey at bedside to assess patient. SVE; patient 10/100/+1.    1330 - SULTANA Sherman CRNA in room to place epidural.    1333 - Time out.    1337 - Test dose.    1435 - Dr. Davey at bedside to assess patient. SVE; patient 10/100/+2. AROM with clear, moderate amount of fluid.    1620 - Patient actively pushing. RN remains in continuous attendance at the bedside. Assessment & evaluation of fetal heart rate ongoing via continuous EFM.    1625 - RN remained at bedside throughout pushing. EFM continuously assessed. Vaginal delivery of viable infant.    1900 - Verbal shift change report given to SHIMA RN (oncoming nurse) by LON Brewer (offgoing nurse). Report included the following information Nurse Handoff Report.

## 2024-02-16 NOTE — ANESTHESIA PRE PROCEDURE
Department of Anesthesiology  Preprocedure Note       Name:  Nasra Monae   Age:  34 y.o.  :  1990                                          MRN:  [id number]         Date:  2024      Surgeon: * No surgeons listed *    Procedure: * No procedures listed *    Medications prior to admission:   Prior to Admission medications    Medication Sig Start Date End Date Taking? Authorizing Provider   amphetamine-dextroamphetamine (ADDERALL) 10 MG tablet Take 1 tablet by mouth daily for 30 days. Max Daily Amount: 10 mg 23  Amarilis Mullins MD   amphetamine-dextroamphetamine (ADDERALL, 10MG,) 10 MG tablet Take 1 tablet by mouth daily for 30 days. Max Daily Amount: 10 mg 23  Amarilis Mullins MD   beclomethasone (QVAR REDIHALER) 40 MCG/ACT AERB inhaler Inhale 1 puff into the lungs in the morning and 1 puff in the evening. 23   Amarilis Mullins MD   albuterol sulfate HFA (PROVENTIL;VENTOLIN;PROAIR) 108 (90 Base) MCG/ACT inhaler INHALE 2 PUFFS BY MOUTH EVERY 4 HOURS AS NEEDED FOR WHEEZING 22   Automatic Reconciliation, Ar   ALPRAZolam (XANAX) 0.5 MG tablet Take 1-2 tablets 1 hour prior flight and then may repeat every 6 hours as needed for anxiety.  Patient not taking: Reported on 2024   Automatic Reconciliation, Ar   fluticasone (FLONASE) 50 MCG/ACT nasal spray As needed SHAKE LIQUID AND USE 2 SPRAYS IN EACH NOSTRIL EVERY DAY  Patient not taking: Reported on 2024 3/27/18   Automatic Reconciliation, Ar       Current medications:    Current Facility-Administered Medications   Medication Dose Route Frequency Provider Last Rate Last Admin   • terbutaline (BRETHINE) injection 0.25 mg  0.25 mg SubCUTAneous Once PRN Lupe Tuttle MD       • lactated ringers IV soln infusion   IntraVENous Continuous Lupe Tuttle  mL/hr at 24 1429 New Bag at 24 1429   • lactated ringers bolus bolus 500 mL  500 mL IntraVENous PRN Lupe Tuttle MD        Or   •      BMI:   Wt Readings from Last 3 Encounters:   24 72.6 kg (160 lb)   23 59 kg (130 lb)   10/06/22 57 kg (125 lb 9.6 oz)     Body mass index is 25.82 kg/m².    CBC:   Lab Results   Component Value Date/Time    WBC 14.5 2024 12:33 PM    RBC 4.31 2024 12:33 PM    HGB 12.0 2024 12:33 PM    HCT 36.0 2024 12:33 PM    MCV 83.5 2024 12:33 PM    RDW 13.1 2024 12:33 PM     2024 12:33 PM       CMP:   Lab Results   Component Value Date/Time     2023 07:16 PM    K 3.5 2023 07:16 PM     2023 07:16 PM    CO2 25 2023 07:16 PM    BUN 10 2023 07:16 PM    CREATININE 0.75 2023 07:16 PM    GFRAA 139 2020 10:33 AM    AGRATIO 0.9 2023 07:16 PM    GLUCOSE 126 2023 07:16 PM    PROT 7.1 2023 07:16 PM    CALCIUM 8.3 2023 07:16 PM    BILITOT 0.4 2023 07:16 PM    ALKPHOS 72 2023 07:16 PM    AST 18 2023 07:16 PM    ALT 29 2023 07:16 PM       POC Tests: No results for input(s): \"POCGLU\", \"POCNA\", \"POCK\", \"POCCL\", \"POCBUN\", \"POCHEMO\", \"POCHCT\" in the last 72 hours.    Coags: No results found for: \"PROTIME\", \"INR\", \"APTT\"    HCG (If Applicable): No results found for: \"PREGTESTUR\", \"PREGSERUM\", \"HCG\", \"HCGQUANT\"     ABGs: No results found for: \"PHART\", \"PO2ART\", \"DHW5RPR\", \"ODW2JYB\", \"BEART\", \"L8WYZRCC\"     Type & Screen (If Applicable):  No results found for: \"LABABO\", \"LABRH\"    Drug/Infectious Status (If Applicable):  No results found for: \"HIV\", \"HEPCAB\"    COVID-19 Screening (If Applicable): No results found for: \"COVID19\"        Anesthesia Evaluation  Patient summary reviewed and Nursing notes reviewed  : History of anesthetic complications: [age] yo  at 39w0d admitted for active labor. Pregnancy normal. Pt complete w BBOW requesting epidural..  Airway: Mallampati: II  TM distance: >3 FB   Neck ROM: full  Mouth opening: > = 3 FB   Dental: normal exam         Pulmonary:normal

## 2024-02-16 NOTE — DISCHARGE INSTRUCTIONS
Discharge Instructions for Vaginal Delivery    Patient ID:  Nasra Monae  [id number]  34 y.o.  1990    Take Home Medications       Continue taking your prenatal vitamins if you are breastfeeding.    Follow-up care is a key part of your treatment and safety. Please schedule and keep appointments.  Follow-up with your primary OB in 6 weeks.    Activity  Avoid anything in your vagina for 6 weeks (no intercourse, tampons, or douching).  You may drive unless you are taking prescription pain medications.  Climbing stairs and light lifting are okay.  Please avoid excessive exercise, though walking is okay- you'll be tired!    Diet  Regular diet as tolerated.  Be sure to drink plenty of fluids if you are breastfeeding.    Wound care  If you have stitches, continue to rinse with a squirt bottle of warm water each time you void for about 7-10 days..  Your stitches will gradually dissolve over four to eight weeks.  Sitz baths are also helpful to keep the wound clean, encourage healing, and to help with pain associated with the stitches or hemorrhoids.  You can use either a sitz bath basin or a bathtub filled with 2-3\" inches of plain warm water.  Soak for 10 minutes 3 times a day as tolerated.    Pain Management  Over the counter medications such as Tylenol and ibuprofen (Motrin or Advil) are ideal.  These may be taken together, alternating doses.  You may  take the maximum dose:  Motrin or Advil (generic ibuprofen), either 3 tablets every 6 hours or 4 tablets every 8 hours or Tylenol (acetominophen) 1000mg every 6 hours (equivalent to 2 extra strength Tylenol).  You may also have a precrescription for stronger pain medication.  Take only as needed and transition to over the counter medication in the next few days. Minimize amounts of the prescription medication, as it can be habit-forming and will worsen or cause constipation. Most patients will find that within a couple of days, their pain is adequately controlled  using only over-the-counter medications.  The prescription pain medication is mixed with Tylenol, therefore, you should not take any extra Tylenol or acetaminophen until you have reduced your prescription pain medication.     Add heating pad or sitz baths as needed. Add hemorrhoid wipes or ointments if needed    Constipation  Constipation is normal after pregnancy and delivery, especially while taking prescription narcotic pain medication.  Over the counter remedies including ducosate (Colace), take 1-2 capsules 1-2 times daily for soft stool as needed.  You may also add/ try milk of magnesia or rectal remedies such as Dulcolax or Fleet’s enema.      Recovery: What to Expect at Home  Fatigue is expected.  Try to rest when you can and don't worry about doing housework or other tasks which can wait.  The soreness along your bottom will improve significantly over the first 2 weeks, but it may take 6 weeks before you are completely recovered.  Back pain or general body aches or muscle soreness are expected and should improve with acetominophen or ibuprofen.  Leg swelling due to pregnancy and/or IV fluids given in the hospital will take about two weeks to resolve.  Most women experience some form of the \"Baby Blues\" after having a baby.  Feeling emotional, tearful, frustrated, anxious, sad, and irritable some of the time is normal and go away after about 2 weeks.  Adequate rest and help from your family will help.  Take breaks from caring for the baby.  Call your doctor if your symptoms seem severe, last more than 2 weeks, or seem to be getting worse instead of better.  Get help immediately if you have thoughts of wanting to hurt yourself or others!      Call your doctor or seek immediate medical care if you have:  Heavy vaginal bleeding, soaking through one or more pads an hour for several hours.   Foul-smelling discharge from your vagina or incision.  Consistent nausea and vomiting and cannot keep fluids

## 2024-02-16 NOTE — H&P
History & Physical    Name: Nasra Monae MRN: [id number]  SSN: [id number]    YOB: 1990  Age: 34 y.o.  Sex: female        Subjective:     Estimated Date of Delivery: 24  OB History          1    Para        Term                AB        Living             SAB        IAB        Ectopic        Molar        Multiple        Live Births                    Ms. Monae is admitted with pregnancy at 39w0d for active labor. Prenatal course was normal. Please see prenatal records for details.    Past Medical History:   Diagnosis Date    ADD (attention deficit disorder)     Allergic rhinitis, cause unspecified     Asthma     exercise induced    MRSA (methicillin resistant Staphylococcus aureus) carrier      Past Surgical History:   Procedure Laterality Date    ORTHOPEDIC SURGERY  2006    orif  left femur metal and 3 pins      Social History     Occupational History    Not on file   Tobacco Use    Smoking status: Never    Smokeless tobacco: Never   Substance and Sexual Activity    Alcohol use: Yes     Alcohol/week: 2.0 standard drinks of alcohol    Drug use: No    Sexual activity: Not on file     Family History   Problem Relation Age of Onset    Heart Disease Paternal Grandfather         cad stints    Heart Disease Father         cad with stints    Coronary Art Dis Father     Breast Cancer Mother     Cancer Mother 55        breast       No Known Allergies  Prior to Admission medications    Medication Sig Start Date End Date Taking? Authorizing Provider   amphetamine-dextroamphetamine (ADDERALL) 10 MG tablet Take 1 tablet by mouth daily for 30 days. Max Daily Amount: 10 mg 23  Amarilis Mullins MD   amphetamine-dextroamphetamine (ADDERALL, 10MG,) 10 MG tablet Take 1 tablet by mouth daily for 30 days. Max Daily Amount: 10 mg 23  Amarilis Mullins MD   beclomethasone (QVAR REDIHALER) 40 MCG/ACT AERB inhaler Inhale 1 puff into the lungs in the morning and 1 puff in the

## 2024-02-16 NOTE — ANESTHESIA PROCEDURE NOTES
CSE Block    Patient location during procedure: OB  Start time: 2/16/2024 1:30 PM  End time: 2/16/2024 1:46 PM  Reason for block: labor epidural  Staffing  Performed: resident/CRNA   Resident/CRNA: Maribell Sherman APRN - CRNA  Performed by: Maribell Sherman APRN - CRNA  Authorized by: Narayan Cruz MD    CSE  Patient position: sitting  Prep: DuraPrep and site prepped and draped  Patient monitoring: frequent blood pressure checks and continuous pulse ox  Approach: midline  Provider prep: sterile gloves and mask  Spinal Needle  Needle type: pencil-tip   Needle gauge: 27 G  Needle length: 4.75 in  Epidural Needle  Injection technique: SASKIA air  Epidural needle type: shin.   Needle gauge: 18 G  Needle length: 3.5 in  Needle insertion depth: 4.5 cm  Location: lumbar (1-5)  Catheter  Catheter type: end hole  Catheter size: 20.  Catheter at skin depth: 9 cm  Test dose: negative  Assessment  Hemodynamics: stable  Additional Notes  Easily placed on first pass; neg heme, neg paresthesia, neg CSF w SASKIA. Easily placed intrathecal dose of 1ml 0.25% bupi w Britney needle (+CSF w puncture); Catheter easily thread to 9 cm; No paresthesia. Pt tolerated v well.  Preanesthetic Checklist  Completed: patient identified, IV checked, site marked, risks and benefits discussed, surgical/procedural consents, equipment checked, pre-op evaluation, timeout performed, anesthesia consent given, oxygen available, monitors applied/VS acknowledged, fire risk safety assessment completed and verbalized and blood product R/B/A discussed and consented

## 2024-02-16 NOTE — DISCHARGE SUMMARY
Obstetrical Discharge Summary     Name: Nasra Monae MRN: [id number]  SSN: [id number]    YOB: 1990  Age: 34 y.o.  Sex: female      Allergies: Patient has no known allergies.    Admit Date: 2024    Discharge Date: 24     Admitting Physician: Lupe Tuttle MD     Attending Physician:  Lupe Tuttle MD     * Admission Diagnoses: Normal labor [O80, Z37.9]    * Discharge Diagnoses:   Information for the patient's :  Small, Female Nasra [[id number]]   @[id number]@      Additional Diagnoses:    Lab Results   Component Value Date/Time    ABORH A NEGATIVE 2024 12:33 PM      Immunization History   Administered Date(s) Administered    COVID-19, PFIZER PURPLE top, DILUTE for use, (age 12 y+), 30mcg/0.3mL 2021, 2021    HPV (Human Papilloma Virus)Vaccine 2006, 2007, 2007, 2008    Hepatitis B vaccine 1997    TDaP, ADACEL (age 10y-64y), BOOSTRIX (age 10y+), IM, 0.5mL 2015    Td vaccine (adult) 2004       * Procedures:   * No surgery found *           * Discharge Condition: good    * Hospital Course: Normal hospital course following the delivery.    * Disposition: Home    Discharge Medications:      Medication List        ASK your doctor about these medications      albuterol sulfate  (90 Base) MCG/ACT inhaler  Commonly known as: PROVENTIL;VENTOLIN;PROAIR     ALPRAZolam 0.5 MG tablet  Commonly known as: XANAX     * amphetamine-dextroamphetamine 10 MG tablet  Commonly known as: ADDERALL (10MG)  Take 1 tablet by mouth daily for 30 days. Max Daily Amount: 10 mg     * amphetamine-dextroamphetamine 10 MG tablet  Commonly known as: ADDERALL  Take 1 tablet by mouth daily for 30 days. Max Daily Amount: 10 mg     beclomethasone 40 MCG/ACT Aerb inhaler  Commonly known as: Qvar RediHaler  Inhale 1 puff into the lungs in the morning and 1 puff in the evening.     fluticasone 50 MCG/ACT nasal spray  Commonly known as: FLONASE            * This list has 2 medication(s) that are the same as other medications prescribed for you. Read the directions carefully, and ask your doctor or other care provider to review them with you.                  * Follow-up Care/Patient Instructions:  Activity: Activity as tolerated  Diet: Regular Diet  Wound Care: As directed    [unfilled]

## 2024-02-17 VITALS
BODY MASS INDEX: 25.71 KG/M2 | OXYGEN SATURATION: 95 % | HEART RATE: 75 BPM | DIASTOLIC BLOOD PRESSURE: 82 MMHG | SYSTOLIC BLOOD PRESSURE: 112 MMHG | RESPIRATION RATE: 16 BRPM | TEMPERATURE: 98.1 F | HEIGHT: 66 IN | WEIGHT: 160 LBS

## 2024-02-17 PROBLEM — Z37.9 NORMAL LABOR: Status: RESOLVED | Noted: 2024-02-16 | Resolved: 2024-02-17

## 2024-02-17 LAB
ABO + RH BLD: NORMAL
BLD PROD TYP BPU: NORMAL
BLD PROD TYP BPU: NORMAL
BLOOD BANK DISPENSE STATUS: NORMAL
BLOOD BANK DISPENSE STATUS: NORMAL
BLOOD GROUP ANTIBODIES SERPL: NORMAL
BLOOD GROUP ANTIBODIES SERPL: NORMAL
BPU ID: NORMAL
BPU ID: NORMAL
CROSSMATCH RESULT: NORMAL
CROSSMATCH RESULT: NORMAL
SPECIMEN EXP DATE BLD: NORMAL
UNIT DIVISION: 0
UNIT DIVISION: 0

## 2024-02-17 PROCEDURE — 6360000002 HC RX W HCPCS: Performed by: OBSTETRICS & GYNECOLOGY

## 2024-02-17 PROCEDURE — 96372 THER/PROPH/DIAG INJ SC/IM: CPT

## 2024-02-17 PROCEDURE — 36415 COLL VENOUS BLD VENIPUNCTURE: CPT

## 2024-02-17 PROCEDURE — 6370000000 HC RX 637 (ALT 250 FOR IP): Performed by: OBSTETRICS & GYNECOLOGY

## 2024-02-17 PROCEDURE — 85461 HEMOGLOBIN FETAL: CPT

## 2024-02-17 PROCEDURE — 86901 BLOOD TYPING SEROLOGIC RH(D): CPT

## 2024-02-17 PROCEDURE — 86900 BLOOD TYPING SEROLOGIC ABO: CPT

## 2024-02-17 RX ADMIN — FERROUS SULFATE TAB 325 MG (65 MG ELEMENTAL FE) 325 MG: 325 (65 FE) TAB at 08:07

## 2024-02-17 RX ADMIN — DOCUSATE SODIUM 100 MG: 100 CAPSULE, LIQUID FILLED ORAL at 08:07

## 2024-02-17 RX ADMIN — HUMAN RHO(D) IMMUNE GLOBULIN 300 MCG: 300 INJECTION, SOLUTION INTRAMUSCULAR at 10:43

## 2024-02-17 RX ADMIN — ACETAMINOPHEN 1000 MG: 500 TABLET ORAL at 14:24

## 2024-02-17 RX ADMIN — ACETAMINOPHEN 1000 MG: 500 TABLET ORAL at 06:20

## 2024-02-17 RX ADMIN — IBUPROFEN 800 MG: 800 TABLET, FILM COATED ORAL at 03:58

## 2024-02-17 ASSESSMENT — PAIN - FUNCTIONAL ASSESSMENT
PAIN_FUNCTIONAL_ASSESSMENT: ACTIVITIES ARE NOT PREVENTED
PAIN_FUNCTIONAL_ASSESSMENT: ACTIVITIES ARE NOT PREVENTED

## 2024-02-17 ASSESSMENT — PAIN DESCRIPTION - DESCRIPTORS
DESCRIPTORS: CRAMPING

## 2024-02-17 ASSESSMENT — PAIN DESCRIPTION - ORIENTATION
ORIENTATION: LOWER

## 2024-02-17 ASSESSMENT — PAIN SCALES - GENERAL
PAINLEVEL_OUTOF10: 6
PAINLEVEL_OUTOF10: 5
PAINLEVEL_OUTOF10: 5

## 2024-02-17 ASSESSMENT — PAIN DESCRIPTION - LOCATION
LOCATION: ABDOMEN

## 2024-02-17 NOTE — PROGRESS NOTES
Post-Partum Vaginal Delivery Note    Nasra Monae  [id number]  1990  34 y.o.    S:  Ms. Nasra Monae is a 34 y.o.  PPD #1 s/p  @ 39w0d.  Doing well.  She had a baby girl.  Her lochia is like a period.  She describes her pain as mild and is well controlled with PO medications.  She is breast feeding and supplementing with formula feeds.  She is ambulating and voiding.  Tolerating PO intake.      O:   /78   Pulse 75   Temp 97.7 °F (36.5 °C) (Oral)   Resp 16   Ht 1.676 m (5' 6\")   Wt 72.6 kg (160 lb)   SpO2 99%   Breastfeeding Unknown   BMI 25.82 kg/m²     Gen - No acute distress  Respirations - nonlabored   Abdomen - Fundus firm below the umbilicus   Ext - Warm, well perfused, nontender     Lab Results   Component Value Date/Time    WBC 14.5 2024 12:33 PM    HGB 12.0 2024 12:33 PM    HCT 36.0 2024 12:33 PM     2024 12:33 PM    MCV 83.5 2024 12:33 PM         A/P:  34 y.o.  PPD #1 s/p  @ 39w0d doing well.    1.  Routine PP instructions/ care discussed  2.  Blood type - Rh neg, needs Rhogam prior to discharge  3.  Rubella immune  4.  Circumcision n/a   5.  Discharge PPD   6.  F/U 4-6 weeks for PP check.      Rufina Tam MD  [facility]

## 2024-02-18 LAB
ABO + RH BLD: NORMAL
BLD PROD TYP BPU: NORMAL
BLOOD BANK DISPENSE STATUS: NORMAL
BPU ID: NORMAL
FETAL SCREEN: NORMAL
T PALLIDUM AB SER QL IA: NON REACTIVE
UNIT DIVISION: 0

## 2024-03-27 NOTE — ANESTHESIA POSTPROCEDURE EVALUATION
Department of Anesthesiology  Postprocedure Note    Patient: Nasra Monae  MRN: [id number]  YOB: 1990  Date of evaluation: 3/27/2024    Procedure Summary       Date: 02/16/24 Room / Location:     Anesthesia Start: 1330 Anesthesia Stop: 1625    Procedure: Labor Analgesia Diagnosis:     Scheduled Providers:  Responsible Provider: Narayan Cruz MD    Anesthesia Type: CSE ASA Status: 2            Anesthesia Type: No value filed.    Ronald Phase I:      Ronald Phase II:      Anesthesia Post Evaluation    Patient location during evaluation: bedside  Patient participation: complete - patient participated  Level of consciousness: awake and alert  Pain score: 0  Airway patency: patent  Nausea & Vomiting: no nausea and no vomiting  Cardiovascular status: blood pressure returned to baseline  Respiratory status: acceptable  Hydration status: euvolemic  Pain management: satisfactory to patient    No notable events documented.

## 2024-08-08 NOTE — TELEPHONE ENCOUNTER
Medication Refill Request    Nasra Monae is requesting a refill of the following medication(s):   albuterol sulfate HFA (PROVENTIL;VENTOLIN;PROAIR) 108 (90 Base) MCG/ACT inhaler                   Please send refill to:     [id number] - [place], [place] - [address]  - P [phone] - F [phone] [address] [facility] [address]  Phone: [phone] Fax: [phone]

## 2024-08-08 NOTE — TELEPHONE ENCOUNTER
Pt has med f/u with [facility] 08/13/24 and is going to est at another practice in the fall

## 2024-08-08 NOTE — TELEPHONE ENCOUNTER
Chief Complaint   Patient presents with    Medication Refill     Last Appointment with Dr. Marcelo Boone 4/11/23  No future appointments.

## 2024-08-09 RX ORDER — ALBUTEROL SULFATE 90 UG/1
2 AEROSOL, METERED RESPIRATORY (INHALATION) EVERY 4 HOURS PRN
Qty: 18 G | Refills: 0 | Status: SHIPPED | OUTPATIENT
Start: 2024-08-09

## 2024-08-13 ENCOUNTER — OFFICE VISIT (OUTPATIENT)
Age: 34
End: 2024-08-13
Payer: COMMERCIAL

## 2024-08-13 VITALS
BODY MASS INDEX: 22.47 KG/M2 | HEART RATE: 74 BPM | DIASTOLIC BLOOD PRESSURE: 71 MMHG | OXYGEN SATURATION: 97 % | TEMPERATURE: 98 F | RESPIRATION RATE: 16 BRPM | SYSTOLIC BLOOD PRESSURE: 105 MMHG | WEIGHT: 139.8 LBS | HEIGHT: 66 IN

## 2024-08-13 DIAGNOSIS — J45.990 ASTHMA, EXERCISE INDUCED: Primary | ICD-10-CM

## 2024-08-13 DIAGNOSIS — F90.0 ATTENTION DEFICIT HYPERACTIVITY DISORDER (ADHD), PREDOMINANTLY INATTENTIVE TYPE: ICD-10-CM

## 2024-08-13 PROCEDURE — 99213 OFFICE O/P EST LOW 20 MIN: CPT | Performed by: NURSE PRACTITIONER

## 2024-08-13 RX ORDER — DEXTROAMPHETAMINE SACCHARATE, AMPHETAMINE ASPARTATE, DEXTROAMPHETAMINE SULFATE AND AMPHETAMINE SULFATE 2.5; 2.5; 2.5; 2.5 MG/1; MG/1; MG/1; MG/1
10 TABLET ORAL DAILY
Qty: 30 TABLET | Refills: 0 | Status: SHIPPED | OUTPATIENT
Start: 2024-08-13 | End: 2024-09-12

## 2024-08-13 RX ORDER — ALBUTEROL SULFATE 90 UG/1
2 AEROSOL, METERED RESPIRATORY (INHALATION) 4 TIMES DAILY PRN
Qty: 18 G | Refills: 1 | Status: SHIPPED | OUTPATIENT
Start: 2024-08-13

## 2024-08-13 SDOH — ECONOMIC STABILITY: FOOD INSECURITY: WITHIN THE PAST 12 MONTHS, YOU WORRIED THAT YOUR FOOD WOULD RUN OUT BEFORE YOU GOT MONEY TO BUY MORE.: NEVER TRUE

## 2024-08-13 SDOH — ECONOMIC STABILITY: FOOD INSECURITY: WITHIN THE PAST 12 MONTHS, THE FOOD YOU BOUGHT JUST DIDN'T LAST AND YOU DIDN'T HAVE MONEY TO GET MORE.: NEVER TRUE

## 2024-08-13 SDOH — ECONOMIC STABILITY: INCOME INSECURITY: HOW HARD IS IT FOR YOU TO PAY FOR THE VERY BASICS LIKE FOOD, HOUSING, MEDICAL CARE, AND HEATING?: NOT VERY HARD

## 2024-08-13 SDOH — ECONOMIC STABILITY: TRANSPORTATION INSECURITY
IN THE PAST 12 MONTHS, HAS LACK OF TRANSPORTATION KEPT YOU FROM MEETINGS, WORK, OR FROM GETTING THINGS NEEDED FOR DAILY LIVING?: NO

## 2024-08-13 ASSESSMENT — PATIENT HEALTH QUESTIONNAIRE - PHQ9
SUM OF ALL RESPONSES TO PHQ QUESTIONS 1-9: 0
2. FEELING DOWN, DEPRESSED OR HOPELESS: NOT AT ALL
SUM OF ALL RESPONSES TO PHQ9 QUESTIONS 1 & 2: 0
1. LITTLE INTEREST OR PLEASURE IN DOING THINGS: NOT AT ALL

## 2024-08-13 NOTE — PROGRESS NOTES
Nasra Monae (:  1990) is a 34 y.o. female,here for evaluation of the following chief complaint(s):  Medication Check    /71   Pulse 74   Temp 98 °F (36.7 °C) (Temporal)   Resp 16   Ht 1.676 m (5' 6\")   Wt 63.4 kg (139 lb 12.8 oz)   SpO2 97%   BMI 22.56 kg/m²       SUBJECTIVE/OBJECTIVE:    HPI:Patient presents for medication refill. She has been off Adderall for over a year because she was pregnant. She is getting ready to return to work and would like a refill on Adderall for as needed. She also requests a refill on albuterol inhaler. She has an upcoming trip to [place] and feels she may need it will elevation changes.    Review of Systems   Psychiatric/Behavioral:  Positive for decreased concentration.        Physical Exam  Constitutional:       Appearance: Normal appearance.   Cardiovascular:      Rate and Rhythm: Normal rate and regular rhythm.   Pulmonary:      Effort: Pulmonary effort is normal.      Breath sounds: Normal breath sounds.   Musculoskeletal:         General: Normal range of motion.   Skin:     General: Skin is warm and dry.   Neurological:      General: No focal deficit present.      Mental Status: She is alert and oriented to person, place, and time.   Psychiatric:         Mood and Affect: Mood normal.         Behavior: Behavior normal.          ASSESSMENT/PLAN:  1. Asthma, exercise induced  2. Attention deficit hyperactivity disorder (ADHD), predominantly inattentive type  -     amphetamine-dextroamphetamine (ADDERALL, 10MG,) 10 MG tablet; Take 1 tablet by mouth daily for 30 days. Max Daily Amount: 10 mg, Disp-30 tablet, R-0Normal  Refill albuterol and adderall  Follow-up in 6 months    --CAMRON Perez - NP

## 2024-10-04 ENCOUNTER — APPOINTMENT (OUTPATIENT)
Facility: HOSPITAL | Age: 34
End: 2024-10-04
Payer: COMMERCIAL

## 2024-10-04 ENCOUNTER — HOSPITAL ENCOUNTER (EMERGENCY)
Facility: HOSPITAL | Age: 34
Discharge: HOME | End: 2024-10-04
Attending: EMERGENCY MEDICINE
Payer: COMMERCIAL

## 2024-10-04 VITALS
BODY MASS INDEX: 22.18 KG/M2 | SYSTOLIC BLOOD PRESSURE: 103 MMHG | HEART RATE: 85 BPM | DIASTOLIC BLOOD PRESSURE: 62 MMHG | HEIGHT: 66 IN | TEMPERATURE: 98 F | RESPIRATION RATE: 22 BRPM | WEIGHT: 138 LBS | OXYGEN SATURATION: 97 %

## 2024-10-04 DIAGNOSIS — N10 ACUTE PYELONEPHRITIS: Primary | ICD-10-CM

## 2024-10-04 LAB
ALBUMIN SERPL-MCNC: 3.3 G/DL (ref 3.5–5)
ALBUMIN/GLOB SERPL: 0.8 (ref 1.1–2.2)
ALP SERPL-CCNC: 125 U/L (ref 45–117)
ALT SERPL-CCNC: 86 U/L (ref 12–78)
ANION GAP SERPL CALC-SCNC: 6 MMOL/L (ref 2–12)
APPEARANCE UR: CLEAR
AST SERPL-CCNC: 70 U/L (ref 15–37)
BACTERIA URNS QL MICRO: NEGATIVE /HPF
BASOPHILS # BLD: 0 K/UL (ref 0–0.1)
BASOPHILS NFR BLD: 0 % (ref 0–1)
BILIRUB SERPL-MCNC: 0.7 MG/DL (ref 0.2–1)
BILIRUB UR QL: NEGATIVE
BUN SERPL-MCNC: 8 MG/DL (ref 6–20)
BUN/CREAT SERPL: 10 (ref 12–20)
CALCIUM SERPL-MCNC: 8.6 MG/DL (ref 8.5–10.1)
CHLORIDE SERPL-SCNC: 105 MMOL/L (ref 97–108)
CO2 SERPL-SCNC: 24 MMOL/L (ref 21–32)
COLOR UR: ABNORMAL
COMMENT:: NORMAL
CREAT SERPL-MCNC: 0.77 MG/DL (ref 0.55–1.02)
DIFFERENTIAL METHOD BLD: ABNORMAL
EOSINOPHIL # BLD: 0 K/UL (ref 0–0.4)
EOSINOPHIL NFR BLD: 0 % (ref 0–7)
EPITH CASTS URNS QL MICRO: ABNORMAL /LPF
ERYTHROCYTE [DISTWIDTH] IN BLOOD BY AUTOMATED COUNT: 12 % (ref 11.5–14.5)
FLUAV RNA SPEC QL NAA+PROBE: NOT DETECTED
FLUBV RNA SPEC QL NAA+PROBE: NOT DETECTED
GLOBULIN SER CALC-MCNC: 4.2 G/DL (ref 2–4)
GLUCOSE SERPL-MCNC: 150 MG/DL (ref 65–100)
GLUCOSE UR STRIP.AUTO-MCNC: NEGATIVE MG/DL
HCG UR QL: NEGATIVE
HCT VFR BLD AUTO: 37.9 % (ref 35–47)
HGB BLD-MCNC: 12.6 G/DL (ref 11.5–16)
HGB UR QL STRIP: ABNORMAL
HYALINE CASTS URNS QL MICRO: ABNORMAL /LPF (ref 0–2)
IMM GRANULOCYTES # BLD AUTO: 0.1 K/UL (ref 0–0.04)
IMM GRANULOCYTES NFR BLD AUTO: 0 % (ref 0–0.5)
KETONES UR QL STRIP.AUTO: NEGATIVE MG/DL
LACTATE BLD-SCNC: 1.83 MMOL/L (ref 0.4–2)
LEUKOCYTE ESTERASE UR QL STRIP.AUTO: ABNORMAL
LYMPHOCYTES # BLD: 1.2 K/UL (ref 0.8–3.5)
LYMPHOCYTES NFR BLD: 10 % (ref 12–49)
MCH RBC QN AUTO: 29.1 PG (ref 26–34)
MCHC RBC AUTO-ENTMCNC: 33.2 G/DL (ref 30–36.5)
MCV RBC AUTO: 87.5 FL (ref 80–99)
MONOCYTES # BLD: 0.8 K/UL (ref 0–1)
MONOCYTES NFR BLD: 7 % (ref 5–13)
NEUTS SEG # BLD: 10.1 K/UL (ref 1.8–8)
NEUTS SEG NFR BLD: 83 % (ref 32–75)
NITRITE UR QL STRIP.AUTO: NEGATIVE
NRBC # BLD: 0 K/UL (ref 0–0.01)
NRBC BLD-RTO: 0 PER 100 WBC
PH UR STRIP: 6.5 (ref 5–8)
PLATELET # BLD AUTO: 223 K/UL (ref 150–400)
PMV BLD AUTO: 9.9 FL (ref 8.9–12.9)
POTASSIUM SERPL-SCNC: 3.2 MMOL/L (ref 3.5–5.1)
PROCALCITONIN SERPL-MCNC: 0.09 NG/ML
PROT SERPL-MCNC: 7.5 G/DL (ref 6.4–8.2)
PROT UR STRIP-MCNC: NEGATIVE MG/DL
RBC # BLD AUTO: 4.33 M/UL (ref 3.8–5.2)
RBC #/AREA URNS HPF: ABNORMAL /HPF (ref 0–5)
SARS-COV-2 RNA RESP QL NAA+PROBE: NOT DETECTED
SODIUM SERPL-SCNC: 135 MMOL/L (ref 136–145)
SOURCE: NORMAL
SP GR UR REFRACTOMETRY: <1.005 (ref 1–1.03)
SPECIMEN HOLD: NORMAL
UROBILINOGEN UR QL STRIP.AUTO: 1 EU/DL (ref 0.2–1)
WBC # BLD AUTO: 12.2 K/UL (ref 3.6–11)
WBC URNS QL MICRO: ABNORMAL /HPF (ref 0–4)

## 2024-10-04 PROCEDURE — 83605 ASSAY OF LACTIC ACID: CPT

## 2024-10-04 PROCEDURE — 71045 X-RAY EXAM CHEST 1 VIEW: CPT

## 2024-10-04 PROCEDURE — 6360000002 HC RX W HCPCS: Performed by: EMERGENCY MEDICINE

## 2024-10-04 PROCEDURE — 84145 PROCALCITONIN (PCT): CPT

## 2024-10-04 PROCEDURE — 2580000003 HC RX 258: Performed by: EMERGENCY MEDICINE

## 2024-10-04 PROCEDURE — 96374 THER/PROPH/DIAG INJ IV PUSH: CPT

## 2024-10-04 PROCEDURE — 85025 COMPLETE CBC W/AUTO DIFF WBC: CPT

## 2024-10-04 PROCEDURE — 6370000000 HC RX 637 (ALT 250 FOR IP): Performed by: EMERGENCY MEDICINE

## 2024-10-04 PROCEDURE — 87086 URINE CULTURE/COLONY COUNT: CPT

## 2024-10-04 PROCEDURE — 80053 COMPREHEN METABOLIC PANEL: CPT

## 2024-10-04 PROCEDURE — 36415 COLL VENOUS BLD VENIPUNCTURE: CPT

## 2024-10-04 PROCEDURE — 87636 SARSCOV2 & INF A&B AMP PRB: CPT

## 2024-10-04 PROCEDURE — 74177 CT ABD & PELVIS W/CONTRAST: CPT

## 2024-10-04 PROCEDURE — 6360000004 HC RX CONTRAST MEDICATION: Performed by: EMERGENCY MEDICINE

## 2024-10-04 PROCEDURE — 99285 EMERGENCY DEPT VISIT HI MDM: CPT

## 2024-10-04 PROCEDURE — 81025 URINE PREGNANCY TEST: CPT

## 2024-10-04 PROCEDURE — 81001 URINALYSIS AUTO W/SCOPE: CPT

## 2024-10-04 RX ORDER — IBUPROFEN 600 MG/1
600 TABLET, FILM COATED ORAL
Status: COMPLETED | OUTPATIENT
Start: 2024-10-04 | End: 2024-10-04

## 2024-10-04 RX ORDER — 0.9 % SODIUM CHLORIDE 0.9 %
1000 INTRAVENOUS SOLUTION INTRAVENOUS ONCE
Status: COMPLETED | OUTPATIENT
Start: 2024-10-04 | End: 2024-10-04

## 2024-10-04 RX ORDER — IOPAMIDOL 755 MG/ML
100 INJECTION, SOLUTION INTRAVASCULAR
Status: COMPLETED | OUTPATIENT
Start: 2024-10-04 | End: 2024-10-04

## 2024-10-04 RX ADMIN — WATER 1000 MG: 1 INJECTION INTRAMUSCULAR; INTRAVENOUS; SUBCUTANEOUS at 22:35

## 2024-10-04 RX ADMIN — SODIUM CHLORIDE 1000 ML: 9 INJECTION, SOLUTION INTRAVENOUS at 18:47

## 2024-10-04 RX ADMIN — IOPAMIDOL 90 ML: 755 INJECTION, SOLUTION INTRAVENOUS at 21:47

## 2024-10-04 RX ADMIN — IBUPROFEN 600 MG: 600 TABLET, FILM COATED ORAL at 18:59

## 2024-10-04 ASSESSMENT — LIFESTYLE VARIABLES
HOW OFTEN DO YOU HAVE A DRINK CONTAINING ALCOHOL: MONTHLY OR LESS
HOW MANY STANDARD DRINKS CONTAINING ALCOHOL DO YOU HAVE ON A TYPICAL DAY: 1 OR 2

## 2024-10-04 ASSESSMENT — PAIN - FUNCTIONAL ASSESSMENT: PAIN_FUNCTIONAL_ASSESSMENT: NONE - DENIES PAIN

## 2024-10-04 NOTE — ED TRIAGE NOTES
Pt ambulatory to ED with c/o dysuria, urinary frequency x 2 weeks pt reports yesterday she developed fever body aches and chills, went to urgent care and was prescribed an antibiotic.

## 2024-10-04 NOTE — ED PROVIDER NOTES
findings:        Interpretation per the Radiologist below, if available at the time of this note:    XR CHEST PORTABLE    (Results Pending)        LABS:  Labs Reviewed   CULTURE, URINE   COVID-19 & INFLUENZA COMBO   EXTRA TUBES HOLD   CBC WITH AUTO DIFFERENTIAL   COMPREHENSIVE METABOLIC PANEL   LACTIC ACID   LACTIC ACID   PROCALCITONIN   URINALYSIS WITH MICROSCOPIC       All other labs were within normal range or not returned as of this dictation.    EMERGENCY DEPARTMENT COURSE and DIFFERENTIAL DIAGNOSIS/MDM:   Vitals:    Vitals:    10/04/24 1836   BP: (!) 140/72   Pulse: (!) 123   Resp: 16   Temp: (!) 103 °F (39.4 °C)   TempSrc: Oral   SpO2: 98%   Weight: 62.6 kg (138 lb)   Height: 1.676 m (5' 6\")           Medical Decision Making  Amount and/or Complexity of Data Reviewed  Labs: ordered.  Radiology: ordered.    Risk  Prescription drug management.            REASSESSMENT      Progress Note:  Results, treatment, and follow up plan have been discussed with patient/mother.  Questions were answered.   Omar Bernard MD  10:25 PM        CONSULTS:  None    PROCEDURES:  Unless otherwise noted below, none     Procedures      FINAL IMPRESSION      Assessment/plan: 34-year-old with a history of ADD, asthma, allergies.  She presents with complaints of fever and urinary symptoms.  Differential diagnosis includes UTI/pyelonephritis, pneumonia, viral illness, meningitis, and others.  Reassuring appearance/exam with stable vital signs.  CBC remarkable for a white blood cell count of 12.  CMP remarkable for a potassium of 3.2 and mildly elevated LFTs.  Lactic acid and procalcitonin okay.  Flu and COVID are negative.  UA unimpressive but CT shows evidence of pyelonephritis.  Chest x-ray okay.  Rocephin in the ED.  She was prescribed Macrobid by an urgent care center.  Continue that pending cultures.  PCP follow-up.  Return precautions. Omar Bernard MD  10:27 PM      DISPOSITION/PLAN   DISPOSITION        PATIENT REFERRED TO:  No  follow-up provider specified.    DISCHARGE MEDICATIONS:  New Prescriptions    No medications on file         (Please note that portions of this note were completed with a voice recognition program.  Efforts were made to edit the dictations but occasionally words are mis-transcribed.)    Omar Bernard MD (electronically signed)  Emergency Attending Physician / Physician Assistant / Nurse Practitioner             Omar Bernard MD  10/04/24 6882

## 2024-10-05 ENCOUNTER — HOSPITAL ENCOUNTER (EMERGENCY)
Facility: HOSPITAL | Age: 34
Discharge: HOME | End: 2024-10-05
Attending: EMERGENCY MEDICINE
Payer: COMMERCIAL

## 2024-10-05 VITALS
OXYGEN SATURATION: 96 % | TEMPERATURE: 99 F | HEIGHT: 66 IN | BODY MASS INDEX: 22.18 KG/M2 | WEIGHT: 138 LBS | HEART RATE: 88 BPM | SYSTOLIC BLOOD PRESSURE: 103 MMHG | RESPIRATION RATE: 16 BRPM | DIASTOLIC BLOOD PRESSURE: 67 MMHG

## 2024-10-05 DIAGNOSIS — N10 ACUTE PYELONEPHRITIS: Primary | ICD-10-CM

## 2024-10-05 LAB
ALBUMIN SERPL-MCNC: 3.2 G/DL (ref 3.5–5)
ALBUMIN/GLOB SERPL: 0.8 (ref 1.1–2.2)
ALP SERPL-CCNC: 114 U/L (ref 45–117)
ALT SERPL-CCNC: 76 U/L (ref 12–78)
ANION GAP SERPL CALC-SCNC: 5 MMOL/L (ref 2–12)
APPEARANCE UR: CLEAR
AST SERPL-CCNC: 38 U/L (ref 15–37)
BACTERIA SPEC CULT: NORMAL
BACTERIA URNS QL MICRO: NEGATIVE /HPF
BASOPHILS # BLD: 0 K/UL (ref 0–0.1)
BASOPHILS NFR BLD: 0 % (ref 0–1)
BILIRUB SERPL-MCNC: 0.3 MG/DL (ref 0.2–1)
BILIRUB UR QL: NEGATIVE
BUN SERPL-MCNC: 5 MG/DL (ref 6–20)
BUN/CREAT SERPL: 9 (ref 12–20)
CALCIUM SERPL-MCNC: 9 MG/DL (ref 8.5–10.1)
CHLORIDE SERPL-SCNC: 106 MMOL/L (ref 97–108)
CO2 SERPL-SCNC: 25 MMOL/L (ref 21–32)
COLOR UR: NORMAL
CREAT SERPL-MCNC: 0.53 MG/DL (ref 0.55–1.02)
DIFFERENTIAL METHOD BLD: ABNORMAL
EOSINOPHIL # BLD: 0.1 K/UL (ref 0–0.4)
EOSINOPHIL NFR BLD: 1 % (ref 0–7)
EPITH CASTS URNS QL MICRO: NORMAL /LPF
ERYTHROCYTE [DISTWIDTH] IN BLOOD BY AUTOMATED COUNT: 12.3 % (ref 11.5–14.5)
GLOBULIN SER CALC-MCNC: 4.2 G/DL (ref 2–4)
GLUCOSE SERPL-MCNC: 113 MG/DL (ref 65–100)
GLUCOSE UR STRIP.AUTO-MCNC: NEGATIVE MG/DL
HCG UR QL: NEGATIVE
HCT VFR BLD AUTO: 35.5 % (ref 35–47)
HGB BLD-MCNC: 11.9 G/DL (ref 11.5–16)
HGB UR QL STRIP: NEGATIVE
HYALINE CASTS URNS QL MICRO: NORMAL /LPF (ref 0–2)
IMM GRANULOCYTES # BLD AUTO: 0 K/UL (ref 0–0.04)
IMM GRANULOCYTES NFR BLD AUTO: 0 % (ref 0–0.5)
KETONES UR QL STRIP.AUTO: NEGATIVE MG/DL
LEUKOCYTE ESTERASE UR QL STRIP.AUTO: NEGATIVE
LIPASE SERPL-CCNC: 32 U/L (ref 13–75)
LYMPHOCYTES # BLD: 1.8 K/UL (ref 0.8–3.5)
LYMPHOCYTES NFR BLD: 15 % (ref 12–49)
MCH RBC QN AUTO: 29.2 PG (ref 26–34)
MCHC RBC AUTO-ENTMCNC: 33.5 G/DL (ref 30–36.5)
MCV RBC AUTO: 87 FL (ref 80–99)
MONOCYTES # BLD: 1 K/UL (ref 0–1)
MONOCYTES NFR BLD: 8 % (ref 5–13)
NEUTS SEG # BLD: 9.1 K/UL (ref 1.8–8)
NEUTS SEG NFR BLD: 76 % (ref 32–75)
NITRITE UR QL STRIP.AUTO: NEGATIVE
NRBC # BLD: 0 K/UL (ref 0–0.01)
NRBC BLD-RTO: 0 PER 100 WBC
PH UR STRIP: 7 (ref 5–8)
PLATELET # BLD AUTO: 242 K/UL (ref 150–400)
PMV BLD AUTO: 9.4 FL (ref 8.9–12.9)
POTASSIUM SERPL-SCNC: 3.5 MMOL/L (ref 3.5–5.1)
PROT SERPL-MCNC: 7.4 G/DL (ref 6.4–8.2)
PROT UR STRIP-MCNC: NEGATIVE MG/DL
RBC # BLD AUTO: 4.08 M/UL (ref 3.8–5.2)
RBC #/AREA URNS HPF: NORMAL /HPF (ref 0–5)
SERVICE CMNT-IMP: NORMAL
SODIUM SERPL-SCNC: 136 MMOL/L (ref 136–145)
SP GR UR REFRACTOMETRY: <1.005 (ref 1–1.03)
SPECIMEN HOLD: NORMAL
UROBILINOGEN UR QL STRIP.AUTO: 1 EU/DL (ref 0.2–1)
WBC # BLD AUTO: 12 K/UL (ref 3.6–11)
WBC URNS QL MICRO: NORMAL /HPF (ref 0–4)

## 2024-10-05 PROCEDURE — 83690 ASSAY OF LIPASE: CPT

## 2024-10-05 PROCEDURE — 81025 URINE PREGNANCY TEST: CPT

## 2024-10-05 PROCEDURE — 2580000003 HC RX 258

## 2024-10-05 PROCEDURE — 96375 TX/PRO/DX INJ NEW DRUG ADDON: CPT

## 2024-10-05 PROCEDURE — 81001 URINALYSIS AUTO W/SCOPE: CPT

## 2024-10-05 PROCEDURE — 80053 COMPREHEN METABOLIC PANEL: CPT

## 2024-10-05 PROCEDURE — 96374 THER/PROPH/DIAG INJ IV PUSH: CPT

## 2024-10-05 PROCEDURE — 96361 HYDRATE IV INFUSION ADD-ON: CPT

## 2024-10-05 PROCEDURE — 85025 COMPLETE CBC W/AUTO DIFF WBC: CPT

## 2024-10-05 PROCEDURE — 36415 COLL VENOUS BLD VENIPUNCTURE: CPT

## 2024-10-05 PROCEDURE — 6360000002 HC RX W HCPCS

## 2024-10-05 PROCEDURE — 99284 EMERGENCY DEPT VISIT MOD MDM: CPT

## 2024-10-05 RX ORDER — 0.9 % SODIUM CHLORIDE 0.9 %
500 INTRAVENOUS SOLUTION INTRAVENOUS ONCE
Status: COMPLETED | OUTPATIENT
Start: 2024-10-05 | End: 2024-10-05

## 2024-10-05 RX ORDER — CEFDINIR 300 MG/1
300 CAPSULE ORAL 2 TIMES DAILY
Qty: 20 CAPSULE | Refills: 0 | Status: SHIPPED | OUTPATIENT
Start: 2024-10-05 | End: 2024-10-15

## 2024-10-05 RX ORDER — KETOROLAC TROMETHAMINE 15 MG/ML
15 INJECTION, SOLUTION INTRAMUSCULAR; INTRAVENOUS ONCE
Status: COMPLETED | OUTPATIENT
Start: 2024-10-05 | End: 2024-10-05

## 2024-10-05 RX ADMIN — SODIUM CHLORIDE 500 ML: 9 INJECTION, SOLUTION INTRAVENOUS at 21:16

## 2024-10-05 RX ADMIN — WATER 1000 MG: 1 INJECTION INTRAMUSCULAR; INTRAVENOUS; SUBCUTANEOUS at 22:29

## 2024-10-05 RX ADMIN — KETOROLAC TROMETHAMINE 15 MG: 15 INJECTION, SOLUTION INTRAMUSCULAR; INTRAVENOUS at 21:13

## 2024-10-05 ASSESSMENT — PAIN DESCRIPTION - ORIENTATION: ORIENTATION: RIGHT;LEFT

## 2024-10-05 ASSESSMENT — PAIN - FUNCTIONAL ASSESSMENT
PAIN_FUNCTIONAL_ASSESSMENT: ACTIVITIES ARE NOT PREVENTED
PAIN_FUNCTIONAL_ASSESSMENT: 0-10

## 2024-10-05 ASSESSMENT — PAIN DESCRIPTION - LOCATION: LOCATION: FLANK

## 2024-10-05 ASSESSMENT — PAIN DESCRIPTION - PAIN TYPE: TYPE: ACUTE PAIN

## 2024-10-05 ASSESSMENT — PAIN DESCRIPTION - DESCRIPTORS: DESCRIPTORS: ACHING;DULL

## 2024-10-05 ASSESSMENT — PAIN SCALES - GENERAL
PAINLEVEL_OUTOF10: 4
PAINLEVEL_OUTOF10: 0

## 2024-10-06 ENCOUNTER — TELEPHONE (OUTPATIENT)
Age: 34
End: 2024-10-06

## 2024-10-06 NOTE — DISCHARGE INSTRUCTIONS
Take the antibiotic as prescribed.  Follow-up with your primary care provider in 2 to 3 days for reevaluation.  You can take ibuprofen or Tylenol as needed for pain.  Return to the emergency department for any new or worsening symptoms.

## 2024-10-06 NOTE — ED TRIAGE NOTES
Pt ambulatory to triage w/ c/o bilateral flank pain after being diagnose with a kidney infection.

## 2024-10-06 NOTE — ED PROVIDER NOTES
[facility] EMERGENCY DEPT  EMERGENCY DEPARTMENT ENCOUNTER      Pt Name: Nasra Monae  MRN: [id number]  Birthdate 1990  Date of evaluation: 10/5/2024  Provider: Lilliam Haynes PA-C    CHIEF COMPLAINT       Chief Complaint   Patient presents with    Flank Pain         HISTORY OF PRESENT ILLNESS   (Location/Symptom, Timing/Onset, Context/Setting, Quality, Duration, Modifying Factors, Severity)  Note limiting factors.   Nasra Monae is a 34 y.o. female with history of  has a past medical history of ADD (attention deficit disorder), Allergic rhinitis, cause unspecified, Asthma, and MRSA carrier who presents from home to [facility] ED with cc of bilateral flank pain.  She describes the pain as mild and aching.  She was diagnosed with pyelonephritis  yesterday evening in this department.  She was given ceftriaxone and discharged home. She was seen at urgent care Thursday and found to have a UTI and started on nitrofurantoin.  She notes ongoing low-grade fevers.  She is tolerating p.o. intake without difficulty.  Reports she was told by her PCP to return to the emergency department due to worsening flank pain.    Per chart review patient had CT evidence of pyelonephritis yesterday evening.  She was given a dose of ceftriaxone.  She was sent home and instructed to continue her nitrofurantoin but was only prescribed a 5-day course initially on Thursday.      PCP: Amarilis Mullins MD    There are no other complaints, changes or physical findings at this time.        The history is provided by the patient and medical records.         Review of External Medical Records:     Nursing Notes were reviewed.    REVIEW OF SYSTEMS    (2-9 systems for level 4, 10 or more for level 5)     Review of Systems   Genitourinary:  Positive for flank pain.       Except as noted above the remainder of the review of systems was reviewed and negative.       PAST MEDICAL HISTORY     Past Medical History:   Diagnosis Date    ADD

## 2024-10-09 ENCOUNTER — OFFICE VISIT (OUTPATIENT)
Age: 34
End: 2024-10-09
Payer: COMMERCIAL

## 2024-10-09 VITALS
BODY MASS INDEX: 22.4 KG/M2 | RESPIRATION RATE: 16 BRPM | HEIGHT: 66 IN | OXYGEN SATURATION: 99 % | DIASTOLIC BLOOD PRESSURE: 75 MMHG | TEMPERATURE: 97.8 F | HEART RATE: 71 BPM | WEIGHT: 139.4 LBS | SYSTOLIC BLOOD PRESSURE: 111 MMHG

## 2024-10-09 DIAGNOSIS — N12 PYELONEPHRITIS: Primary | ICD-10-CM

## 2024-10-09 LAB
BILIRUBIN, URINE, POC: NEGATIVE
BLOOD URINE, POC: NORMAL
GLUCOSE URINE, POC: NEGATIVE
KETONES, URINE, POC: NEGATIVE
LEUKOCYTE ESTERASE, URINE, POC: NEGATIVE
NITRITE, URINE, POC: NEGATIVE
PH, URINE, POC: 5.5 (ref 4.6–8)
PROTEIN,URINE, POC: NEGATIVE
SPECIFIC GRAVITY, URINE, POC: 1 (ref 1–1.03)
URINALYSIS CLARITY, POC: CLEAR
URINALYSIS COLOR, POC: YELLOW
UROBILINOGEN, POC: NORMAL MG/DL

## 2024-10-09 PROCEDURE — 81001 URINALYSIS AUTO W/SCOPE: CPT | Performed by: INTERNAL MEDICINE

## 2024-10-09 PROCEDURE — 99213 OFFICE O/P EST LOW 20 MIN: CPT | Performed by: INTERNAL MEDICINE

## 2024-10-09 ASSESSMENT — ENCOUNTER SYMPTOMS
ABDOMINAL PAIN: 0
NAUSEA: 0
DIARRHEA: 0
SHORTNESS OF BREATH: 0

## 2024-10-09 ASSESSMENT — PATIENT HEALTH QUESTIONNAIRE - PHQ9
2. FEELING DOWN, DEPRESSED OR HOPELESS: NOT AT ALL
SUM OF ALL RESPONSES TO PHQ9 QUESTIONS 1 & 2: 0
SUM OF ALL RESPONSES TO PHQ QUESTIONS 1-9: 0
1. LITTLE INTEREST OR PLEASURE IN DOING THINGS: NOT AT ALL
SUM OF ALL RESPONSES TO PHQ QUESTIONS 1-9: 0

## 2024-10-09 NOTE — PROGRESS NOTES
Nasra Monae is a 34 y.o. female who was seen in clinic today (10/9/2024).      Assessment & Plan:   Below is the assessment and plan developed based on review of pertinent history, physical exam, labs, studies, and medications.  1. Pyelonephritis  -     CBC with Auto Differential; Future  -     AMB POC URINALYSIS DIP STICK AUTO W/ MICRO    Clinically improving and she is on an appropriate course of antibiotic therapy.we will plan to repeat her CBC on completion of her antibiotics and reviewed red flags for reaching out to me if she has worsening symptoms or to go to the emergency room.  Of note urine dip today positive for blood and patient is on menstrual cycle today.  No white cells.  Return if symptoms worsen or fail to improve.   Subjective/Objective:   Nasra was seen today for Follow-up  Recent diagnosis of pyelonephritis confirmed with CT imaging.  Reviewed both of her ER visits and the course of treatment with ceftriaxone x 2 doses and now on cefdinir for 10 days.  Her fever and flank pain have now resolved.  No urinary symptoms.  Reviewed ER laboratory work and imaging with her today.  Urine culture was negative.  Patient Active Problem List   Diagnosis    Fear of flying    Asthma, exercise induced    Allergic rhinitis    Attention deficit hyperactivity disorder (ADHD), predominantly inattentive type       Current Outpatient Medications   Medication Sig Dispense Refill    cefdinir (OMNICEF) 300 MG capsule Take 1 capsule by mouth 2 times daily for 10 days 20 capsule 0    albuterol sulfate HFA (VENTOLIN HFA) 108 (90 Base) MCG/ACT inhaler Inhale 2 puffs into the lungs 4 times daily as needed for Wheezing 18 g 1    beclomethasone (QVAR REDIHALER) 40 MCG/ACT AERB inhaler Inhale 1 puff into the lungs in the morning and 1 puff in the evening. 10.6 g 0    fluticasone (FLONASE) 50 MCG/ACT nasal spray As needed SHAKE LIQUID AND USE 2 SPRAYS IN EACH NOSTRIL EVERY DAY      amphetamine-dextroamphetamine

## 2024-10-16 DIAGNOSIS — N12 PYELONEPHRITIS: ICD-10-CM

## 2024-10-16 LAB
BASOPHILS # BLD: 0 K/UL (ref 0–0.1)
BASOPHILS NFR BLD: 0 % (ref 0–1)
DIFFERENTIAL METHOD BLD: NORMAL
EOSINOPHIL # BLD: 0.3 K/UL (ref 0–0.4)
EOSINOPHIL NFR BLD: 5 % (ref 0–7)
ERYTHROCYTE [DISTWIDTH] IN BLOOD BY AUTOMATED COUNT: 12.1 % (ref 11.5–14.5)
HCT VFR BLD AUTO: 41.5 % (ref 35–47)
HGB BLD-MCNC: 13.2 G/DL (ref 11.5–16)
IMM GRANULOCYTES # BLD AUTO: 0 K/UL (ref 0–0.04)
IMM GRANULOCYTES NFR BLD AUTO: 0 % (ref 0–0.5)
LYMPHOCYTES # BLD: 2.4 K/UL (ref 0.8–3.5)
LYMPHOCYTES NFR BLD: 38 % (ref 12–49)
MCH RBC QN AUTO: 28.5 PG (ref 26–34)
MCHC RBC AUTO-ENTMCNC: 31.8 G/DL (ref 30–36.5)
MCV RBC AUTO: 89.6 FL (ref 80–99)
MONOCYTES # BLD: 0.4 K/UL (ref 0–1)
MONOCYTES NFR BLD: 6 % (ref 5–13)
NEUTS SEG # BLD: 3.3 K/UL (ref 1.8–8)
NEUTS SEG NFR BLD: 51 % (ref 32–75)
NRBC # BLD: 0 K/UL (ref 0–0.01)
NRBC BLD-RTO: 0 PER 100 WBC
PLATELET # BLD AUTO: 340 K/UL (ref 150–400)
PMV BLD AUTO: 9.9 FL (ref 8.9–12.9)
RBC # BLD AUTO: 4.63 M/UL (ref 3.8–5.2)
WBC # BLD AUTO: 6.5 K/UL (ref 3.6–11)

## 2025-01-06 NOTE — PROGRESS NOTES
Labor Progress Note  Patient seen, fetal heart rate and contraction pattern evaluated, patient examined.  Patient Vitals for the past 2 hrs:   BP Temp Temp src Pulse Resp SpO2   02/16/24 1430 113/69 -- -- 96 -- --   02/16/24 1415 110/70 -- -- 90 -- --   02/16/24 1400 (!) 114/54 -- -- 85 -- --   02/16/24 1345 117/64 98.1 °F (36.7 °C) Oral 87 16 99 %       Physical Exam:  Cervical Exam:  complete/+2 arom clear fluid  Uterine Activity: q 2 min  Fetal Heart Rate: Reactive    Assessment/Plan:  IUP at 39w0d labor    Reassuring fetal status, Continue plan for vaginal delivery  GBS neg  Start pushing with urge    Tessa Davey DO, FACOG  [facility]             Yes

## 2025-01-27 ENCOUNTER — TELEPHONE (OUTPATIENT)
Age: 35
End: 2025-01-27

## 2025-01-27 DIAGNOSIS — F90.0 ATTENTION DEFICIT HYPERACTIVITY DISORDER (ADHD), PREDOMINANTLY INATTENTIVE TYPE: ICD-10-CM

## 2025-01-27 RX ORDER — DEXTROAMPHETAMINE SACCHARATE, AMPHETAMINE ASPARTATE, DEXTROAMPHETAMINE SULFATE AND AMPHETAMINE SULFATE 2.5; 2.5; 2.5; 2.5 MG/1; MG/1; MG/1; MG/1
10 TABLET ORAL DAILY
Qty: 30 TABLET | Refills: 0 | Status: SHIPPED | OUTPATIENT
Start: 2025-01-27 | End: 2025-02-26

## 2025-01-27 RX ORDER — ALBUTEROL SULFATE 90 UG/1
2 INHALANT RESPIRATORY (INHALATION) 4 TIMES DAILY PRN
Qty: 18 G | Refills: 1 | Status: SHIPPED | OUTPATIENT
Start: 2025-01-27

## 2025-01-27 NOTE — TELEPHONE ENCOUNTER
reviewed. Notify pt she is due for her 6 month follow up appt on her Adderall and a 30 day refill is sent. Make follow up appt.

## 2025-01-27 NOTE — TELEPHONE ENCOUNTER
Medication Refill Request    Nasra LEVI Monae is requesting a refill of the following medication(s):   Albuterol  Adderall    Please send refill to:     [id number] - [place], [place] - [address] - P [phone] - F [phone] [address] [facility] [address]  Phone: [phone] Fax: [phone]

## 2025-02-03 ENCOUNTER — HOSPITAL ENCOUNTER (INPATIENT)
Facility: HOSPITAL | Age: 35
LOS: 2 days | Discharge: HOME | DRG: 203 | End: 2025-02-08
Attending: EMERGENCY MEDICINE | Admitting: HOSPITALIST
Payer: COMMERCIAL

## 2025-02-03 ENCOUNTER — OFFICE VISIT (OUTPATIENT)
Age: 35
End: 2025-02-03

## 2025-02-03 ENCOUNTER — APPOINTMENT (OUTPATIENT)
Facility: HOSPITAL | Age: 35
DRG: 203 | End: 2025-02-03
Payer: COMMERCIAL

## 2025-02-03 VITALS
BODY MASS INDEX: 22.34 KG/M2 | HEIGHT: 66 IN | OXYGEN SATURATION: 97 % | WEIGHT: 139 LBS | HEART RATE: 86 BPM | DIASTOLIC BLOOD PRESSURE: 71 MMHG | TEMPERATURE: 97.6 F | SYSTOLIC BLOOD PRESSURE: 101 MMHG

## 2025-02-03 DIAGNOSIS — J45.901 EXACERBATION OF ASTHMA, UNSPECIFIED ASTHMA SEVERITY, UNSPECIFIED WHETHER PERSISTENT: Primary | ICD-10-CM

## 2025-02-03 DIAGNOSIS — J10.1 INFLUENZA A: ICD-10-CM

## 2025-02-03 LAB
ALBUMIN SERPL-MCNC: 3.7 G/DL (ref 3.5–5)
ALBUMIN/GLOB SERPL: 0.8 (ref 1.1–2.2)
ALP SERPL-CCNC: 87 U/L (ref 45–117)
ALT SERPL-CCNC: 46 U/L (ref 12–78)
ANION GAP SERPL CALC-SCNC: 4 MMOL/L (ref 2–12)
AST SERPL-CCNC: 26 U/L (ref 15–37)
BASOPHILS # BLD: 0 K/UL (ref 0–0.1)
BASOPHILS NFR BLD: 0 % (ref 0–1)
BILIRUB SERPL-MCNC: 0.2 MG/DL (ref 0.2–1)
BUN SERPL-MCNC: 11 MG/DL (ref 6–20)
BUN/CREAT SERPL: 13 (ref 12–20)
CALCIUM SERPL-MCNC: 9.2 MG/DL (ref 8.5–10.1)
CHLORIDE SERPL-SCNC: 107 MMOL/L (ref 97–108)
CO2 SERPL-SCNC: 27 MMOL/L (ref 21–32)
CREAT SERPL-MCNC: 0.82 MG/DL (ref 0.55–1.02)
DIFFERENTIAL METHOD BLD: ABNORMAL
EOSINOPHIL # BLD: 0 K/UL (ref 0–0.4)
EOSINOPHIL NFR BLD: 0 % (ref 0–7)
ERYTHROCYTE [DISTWIDTH] IN BLOOD BY AUTOMATED COUNT: 11.9 % (ref 11.5–14.5)
GLOBULIN SER CALC-MCNC: 4.4 G/DL (ref 2–4)
GLUCOSE SERPL-MCNC: 121 MG/DL (ref 65–100)
HCT VFR BLD AUTO: 41.4 % (ref 35–47)
HGB BLD-MCNC: 13.8 G/DL (ref 11.5–16)
IMM GRANULOCYTES # BLD AUTO: 0 K/UL
IMM GRANULOCYTES NFR BLD AUTO: 0 %
LYMPHOCYTES # BLD: 1.03 K/UL (ref 0.8–3.5)
LYMPHOCYTES NFR BLD: 18 % (ref 12–49)
MAGNESIUM SERPL-MCNC: 2.4 MG/DL (ref 1.6–2.4)
MCH RBC QN AUTO: 29.1 PG (ref 26–34)
MCHC RBC AUTO-ENTMCNC: 33.3 G/DL (ref 30–36.5)
MCV RBC AUTO: 87.2 FL (ref 80–99)
MONOCYTES # BLD: 0.17 K/UL (ref 0–1)
MONOCYTES NFR BLD: 3 % (ref 5–13)
NEUTS SEG # BLD: 4.5 K/UL (ref 1.8–8)
NEUTS SEG NFR BLD: 79 % (ref 32–75)
NRBC # BLD: 0 K/UL (ref 0–0.01)
NRBC BLD-RTO: 0 PER 100 WBC
NT PRO BNP: 83 PG/ML
PLATELET # BLD AUTO: 275 K/UL (ref 150–400)
PMV BLD AUTO: 9.4 FL (ref 8.9–12.9)
POTASSIUM SERPL-SCNC: 4.3 MMOL/L (ref 3.5–5.1)
PROT SERPL-MCNC: 8.1 G/DL (ref 6.4–8.2)
RBC # BLD AUTO: 4.75 M/UL (ref 3.8–5.2)
RBC MORPH BLD: ABNORMAL
SODIUM SERPL-SCNC: 138 MMOL/L (ref 136–145)
TROPONIN I SERPL HS-MCNC: 11 NG/L (ref 0–51)
WBC # BLD AUTO: 5.7 K/UL (ref 3.6–11)
WBC MORPH BLD: ABNORMAL

## 2025-02-03 PROCEDURE — 6360000002 HC RX W HCPCS: Performed by: STUDENT IN AN ORGANIZED HEALTH CARE EDUCATION/TRAINING PROGRAM

## 2025-02-03 PROCEDURE — 96365 THER/PROPH/DIAG IV INF INIT: CPT

## 2025-02-03 PROCEDURE — 96375 TX/PRO/DX INJ NEW DRUG ADDON: CPT

## 2025-02-03 PROCEDURE — 71046 X-RAY EXAM CHEST 2 VIEWS: CPT

## 2025-02-03 PROCEDURE — 2500000003 HC RX 250 WO HCPCS: Performed by: STUDENT IN AN ORGANIZED HEALTH CARE EDUCATION/TRAINING PROGRAM

## 2025-02-03 PROCEDURE — 83880 ASSAY OF NATRIURETIC PEPTIDE: CPT

## 2025-02-03 PROCEDURE — G0378 HOSPITAL OBSERVATION PER HR: HCPCS

## 2025-02-03 PROCEDURE — 6370000000 HC RX 637 (ALT 250 FOR IP): Performed by: STUDENT IN AN ORGANIZED HEALTH CARE EDUCATION/TRAINING PROGRAM

## 2025-02-03 PROCEDURE — 36415 COLL VENOUS BLD VENIPUNCTURE: CPT

## 2025-02-03 PROCEDURE — 85025 COMPLETE CBC W/AUTO DIFF WBC: CPT

## 2025-02-03 PROCEDURE — 80053 COMPREHEN METABOLIC PANEL: CPT

## 2025-02-03 PROCEDURE — 99285 EMERGENCY DEPT VISIT HI MDM: CPT

## 2025-02-03 PROCEDURE — 83735 ASSAY OF MAGNESIUM: CPT

## 2025-02-03 PROCEDURE — 84484 ASSAY OF TROPONIN QUANT: CPT

## 2025-02-03 PROCEDURE — 93005 ELECTROCARDIOGRAM TRACING: CPT | Performed by: EMERGENCY MEDICINE

## 2025-02-03 RX ORDER — IPRATROPIUM BROMIDE AND ALBUTEROL SULFATE 2.5; .5 MG/3ML; MG/3ML
1 SOLUTION RESPIRATORY (INHALATION)
Status: DISCONTINUED | OUTPATIENT
Start: 2025-02-04 | End: 2025-02-05

## 2025-02-03 RX ORDER — DOXYCYCLINE HYCLATE 20 MG
20 TABLET ORAL 2 TIMES DAILY
Status: ON HOLD | COMMUNITY
End: 2025-02-08 | Stop reason: HOSPADM

## 2025-02-03 RX ORDER — GUAIFENESIN 600 MG/1
600 TABLET, EXTENDED RELEASE ORAL 2 TIMES DAILY
Status: DISCONTINUED | OUTPATIENT
Start: 2025-02-03 | End: 2025-02-03

## 2025-02-03 RX ORDER — IPRATROPIUM BROMIDE AND ALBUTEROL SULFATE 2.5; .5 MG/3ML; MG/3ML
1 SOLUTION RESPIRATORY (INHALATION) EVERY 20 MIN
Status: COMPLETED | OUTPATIENT
Start: 2025-02-03 | End: 2025-02-03

## 2025-02-03 RX ORDER — PREDNISONE 20 MG/1
TABLET ORAL
Qty: 15 TABLET | Refills: 0 | Status: ON HOLD | OUTPATIENT
Start: 2025-02-03 | End: 2025-02-08

## 2025-02-03 RX ORDER — KETOROLAC TROMETHAMINE 30 MG/ML
30 INJECTION, SOLUTION INTRAMUSCULAR; INTRAVENOUS ONCE
Status: COMPLETED | OUTPATIENT
Start: 2025-02-03 | End: 2025-02-03

## 2025-02-03 RX ORDER — MAGNESIUM SULFATE IN WATER 40 MG/ML
2000 INJECTION, SOLUTION INTRAVENOUS
Status: COMPLETED | OUTPATIENT
Start: 2025-02-03 | End: 2025-02-03

## 2025-02-03 RX ORDER — GUAIFENESIN 600 MG/1
600 TABLET, EXTENDED RELEASE ORAL 2 TIMES DAILY
Status: DISCONTINUED | OUTPATIENT
Start: 2025-02-03 | End: 2025-02-05

## 2025-02-03 RX ORDER — ENOXAPARIN SODIUM 100 MG/ML
40 INJECTION SUBCUTANEOUS DAILY
Status: DISCONTINUED | OUTPATIENT
Start: 2025-02-04 | End: 2025-02-08 | Stop reason: HOSPADM

## 2025-02-03 RX ADMIN — IPRATROPIUM BROMIDE AND ALBUTEROL SULFATE 1 DOSE: .5; 3 SOLUTION RESPIRATORY (INHALATION) at 20:20

## 2025-02-03 RX ADMIN — IPRATROPIUM BROMIDE AND ALBUTEROL SULFATE 1 DOSE: .5; 3 SOLUTION RESPIRATORY (INHALATION) at 20:44

## 2025-02-03 RX ADMIN — WATER 60 MG: 1 INJECTION INTRAMUSCULAR; INTRAVENOUS; SUBCUTANEOUS at 21:37

## 2025-02-03 RX ADMIN — KETOROLAC TROMETHAMINE 30 MG: 30 INJECTION, SOLUTION INTRAMUSCULAR at 20:10

## 2025-02-03 RX ADMIN — MAGNESIUM SULFATE HEPTAHYDRATE 2000 MG: 40 INJECTION, SOLUTION INTRAVENOUS at 21:43

## 2025-02-03 RX ADMIN — IPRATROPIUM BROMIDE AND ALBUTEROL SULFATE 1 DOSE: .5; 3 SOLUTION RESPIRATORY (INHALATION) at 20:16

## 2025-02-03 ASSESSMENT — ENCOUNTER SYMPTOMS
SORE THROAT: 0
VOMITING: 0
DIARRHEA: 0
COUGH: 1
NAUSEA: 0
WHEEZING: 1

## 2025-02-03 ASSESSMENT — PAIN SCALES - GENERAL
PAINLEVEL_OUTOF10: 0
PAINLEVEL_OUTOF10: 0

## 2025-02-03 ASSESSMENT — PAIN - FUNCTIONAL ASSESSMENT: PAIN_FUNCTIONAL_ASSESSMENT: 0-10

## 2025-02-03 NOTE — PROGRESS NOTES
Subjective     Chief Complaint   Patient presents with    Wheezing     Wheezing, chest tightness was given prednisone last week for the flu, little to no improvement, cough worse at night          Wheezing   Associated symptoms include coughing. Pertinent negatives include no chills, diarrhea, fever, sore throat or vomiting.    34-year-old female presents for wheezing chest tightness and cough.  Patient was seen at another facility was given 40 mg of prednisone to take as well as put on doxycycline she finished the prednisone but still tight.  She has been using albuterol at home.  No fever chills nausea vomiting.    Past Medical History:   Diagnosis Date    ADD (attention deficit disorder)     Allergic rhinitis, cause unspecified     Asthma     exercise induced    MRSA (methicillin resistant Staphylococcus aureus) carrier        Past Surgical History:   Procedure Laterality Date    ORTHOPEDIC SURGERY  5/2006    orif  left femur metal and 3 pins        Family History   Problem Relation Age of Onset    Heart Disease Paternal Grandfather         cad stints    Heart Disease Father         cad with stints    Coronary Art Dis Father     Breast Cancer Mother     Cancer Mother 55        breast       No Known Allergies    Social History     Tobacco Use    Smoking status: Never    Smokeless tobacco: Never   Substance Use Topics    Alcohol use: Yes     Alcohol/week: 2.0 standard drinks of alcohol    Drug use: No       Vitals:    02/03/25 1021   BP: 101/71   Pulse: 86   Temp: 97.6 °F (36.4 °C)   SpO2: 97%       Objective     Review of Systems   Constitutional:  Negative for chills and fever.   HENT:  Negative for congestion and sore throat.    Respiratory:  Positive for cough and wheezing.    Gastrointestinal:  Negative for diarrhea, nausea and vomiting.       Physical Exam  Vitals reviewed.   Constitutional:       Appearance: Normal appearance.   HENT:      Right Ear: Tympanic membrane normal.      Left Ear: Tympanic membrane

## 2025-02-04 LAB
ANION GAP SERPL CALC-SCNC: 7 MMOL/L (ref 2–12)
B PERT DNA SPEC QL NAA+PROBE: NOT DETECTED
BASOPHILS # BLD: 0.01 K/UL (ref 0–0.1)
BASOPHILS NFR BLD: 0.2 % (ref 0–1)
BORDETELLA PARAPERTUSSIS BY PCR: NOT DETECTED
BUN SERPL-MCNC: 8 MG/DL (ref 6–20)
BUN/CREAT SERPL: 10 (ref 12–20)
C PNEUM DNA SPEC QL NAA+PROBE: NOT DETECTED
CALCIUM SERPL-MCNC: 8.7 MG/DL (ref 8.5–10.1)
CHLORIDE SERPL-SCNC: 109 MMOL/L (ref 97–108)
CO2 SERPL-SCNC: 23 MMOL/L (ref 21–32)
CREAT SERPL-MCNC: 0.81 MG/DL (ref 0.55–1.02)
DIFFERENTIAL METHOD BLD: ABNORMAL
EKG ATRIAL RATE: 85 BPM
EKG DIAGNOSIS: NORMAL
EKG P AXIS: 69 DEGREES
EKG P-R INTERVAL: 114 MS
EKG Q-T INTERVAL: 366 MS
EKG QRS DURATION: 80 MS
EKG QTC CALCULATION (BAZETT): 435 MS
EKG R AXIS: 87 DEGREES
EKG T AXIS: 64 DEGREES
EKG VENTRICULAR RATE: 85 BPM
EOSINOPHIL # BLD: 0 K/UL (ref 0–0.4)
EOSINOPHIL NFR BLD: 0 % (ref 0–7)
ERYTHROCYTE [DISTWIDTH] IN BLOOD BY AUTOMATED COUNT: 11.9 % (ref 11.5–14.5)
FLUAV H3 RNA SPEC QL NAA+PROBE: DETECTED
FLUBV RNA SPEC QL NAA+PROBE: NOT DETECTED
GLUCOSE SERPL-MCNC: 141 MG/DL (ref 65–100)
HADV DNA SPEC QL NAA+PROBE: NOT DETECTED
HCG UR QL: NEGATIVE
HCOV 229E RNA SPEC QL NAA+PROBE: NOT DETECTED
HCOV HKU1 RNA SPEC QL NAA+PROBE: NOT DETECTED
HCOV NL63 RNA SPEC QL NAA+PROBE: NOT DETECTED
HCOV OC43 RNA SPEC QL NAA+PROBE: NOT DETECTED
HCT VFR BLD AUTO: 39.8 % (ref 35–47)
HGB BLD-MCNC: 13 G/DL (ref 11.5–16)
HMPV RNA SPEC QL NAA+PROBE: NOT DETECTED
HPIV1 RNA SPEC QL NAA+PROBE: NOT DETECTED
HPIV2 RNA SPEC QL NAA+PROBE: NOT DETECTED
HPIV3 RNA SPEC QL NAA+PROBE: NOT DETECTED
HPIV4 RNA SPEC QL NAA+PROBE: NOT DETECTED
IMM GRANULOCYTES # BLD AUTO: 0.02 K/UL (ref 0–0.04)
IMM GRANULOCYTES NFR BLD AUTO: 0.3 % (ref 0–0.5)
LYMPHOCYTES # BLD: 0.89 K/UL (ref 0.8–3.5)
LYMPHOCYTES NFR BLD: 13.7 % (ref 12–49)
M PNEUMO DNA SPEC QL NAA+PROBE: NOT DETECTED
MCH RBC QN AUTO: 28.8 PG (ref 26–34)
MCHC RBC AUTO-ENTMCNC: 32.7 G/DL (ref 30–36.5)
MCV RBC AUTO: 88.2 FL (ref 80–99)
MONOCYTES # BLD: 0.1 K/UL (ref 0–1)
MONOCYTES NFR BLD: 1.5 % (ref 5–13)
NEUTS SEG # BLD: 5.47 K/UL (ref 1.8–8)
NEUTS SEG NFR BLD: 84.3 % (ref 32–75)
NRBC # BLD: 0 K/UL (ref 0–0.01)
NRBC BLD-RTO: 0 PER 100 WBC
PLATELET # BLD AUTO: 258 K/UL (ref 150–400)
PMV BLD AUTO: 9.3 FL (ref 8.9–12.9)
POTASSIUM SERPL-SCNC: 4.1 MMOL/L (ref 3.5–5.1)
RBC # BLD AUTO: 4.51 M/UL (ref 3.8–5.2)
RSV RNA SPEC QL NAA+PROBE: NOT DETECTED
RV+EV RNA SPEC QL NAA+PROBE: NOT DETECTED
SARS-COV-2 RNA RESP QL NAA+PROBE: NOT DETECTED
SODIUM SERPL-SCNC: 139 MMOL/L (ref 136–145)
WBC # BLD AUTO: 6.5 K/UL (ref 3.6–11)

## 2025-02-04 PROCEDURE — 2500000003 HC RX 250 WO HCPCS: Performed by: INTERNAL MEDICINE

## 2025-02-04 PROCEDURE — 80048 BASIC METABOLIC PNL TOTAL CA: CPT

## 2025-02-04 PROCEDURE — G0378 HOSPITAL OBSERVATION PER HR: HCPCS | Performed by: INTERNAL MEDICINE

## 2025-02-04 PROCEDURE — 96367 TX/PROPH/DG ADDL SEQ IV INF: CPT

## 2025-02-04 PROCEDURE — 6360000002 HC RX W HCPCS: Performed by: INTERNAL MEDICINE

## 2025-02-04 PROCEDURE — 6370000000 HC RX 637 (ALT 250 FOR IP): Performed by: HOSPITALIST

## 2025-02-04 PROCEDURE — 81025 URINE PREGNANCY TEST: CPT

## 2025-02-04 PROCEDURE — 96376 TX/PRO/DX INJ SAME DRUG ADON: CPT

## 2025-02-04 PROCEDURE — 96366 THER/PROPH/DIAG IV INF ADDON: CPT

## 2025-02-04 PROCEDURE — 6360000002 HC RX W HCPCS: Performed by: HOSPITALIST

## 2025-02-04 PROCEDURE — 94640 AIRWAY INHALATION TREATMENT: CPT

## 2025-02-04 PROCEDURE — 85025 COMPLETE CBC W/AUTO DIFF WBC: CPT

## 2025-02-04 PROCEDURE — 94664 DEMO&/EVAL PT USE INHALER: CPT

## 2025-02-04 PROCEDURE — G0378 HOSPITAL OBSERVATION PER HR: HCPCS

## 2025-02-04 PROCEDURE — 2580000003 HC RX 258: Performed by: HOSPITALIST

## 2025-02-04 PROCEDURE — 96372 THER/PROPH/DIAG INJ SC/IM: CPT

## 2025-02-04 PROCEDURE — 0202U NFCT DS 22 TRGT SARS-COV-2: CPT

## 2025-02-04 PROCEDURE — 6370000000 HC RX 637 (ALT 250 FOR IP): Performed by: INTERNAL MEDICINE

## 2025-02-04 PROCEDURE — 94761 N-INVAS EAR/PLS OXIMETRY MLT: CPT

## 2025-02-04 PROCEDURE — 36415 COLL VENOUS BLD VENIPUNCTURE: CPT

## 2025-02-04 PROCEDURE — 96375 TX/PRO/DX INJ NEW DRUG ADDON: CPT

## 2025-02-04 PROCEDURE — 93010 ELECTROCARDIOGRAM REPORT: CPT | Performed by: INTERNAL MEDICINE

## 2025-02-04 RX ORDER — ARFORMOTEROL TARTRATE 15 UG/2ML
15 SOLUTION RESPIRATORY (INHALATION)
Status: DISCONTINUED | OUTPATIENT
Start: 2025-02-04 | End: 2025-02-08 | Stop reason: HOSPADM

## 2025-02-04 RX ORDER — OSELTAMIVIR PHOSPHATE 75 MG/1
75 CAPSULE ORAL 2 TIMES DAILY
Status: DISCONTINUED | OUTPATIENT
Start: 2025-02-04 | End: 2025-02-08 | Stop reason: HOSPADM

## 2025-02-04 RX ORDER — ONDANSETRON 2 MG/ML
4 INJECTION INTRAMUSCULAR; INTRAVENOUS EVERY 6 HOURS PRN
Status: DISCONTINUED | OUTPATIENT
Start: 2025-02-04 | End: 2025-02-08 | Stop reason: HOSPADM

## 2025-02-04 RX ORDER — BENZONATATE 100 MG/1
100 CAPSULE ORAL 3 TIMES DAILY PRN
Status: DISCONTINUED | OUTPATIENT
Start: 2025-02-04 | End: 2025-02-08 | Stop reason: HOSPADM

## 2025-02-04 RX ORDER — BUDESONIDE 0.5 MG/2ML
0.5 INHALANT ORAL
Status: DISCONTINUED | OUTPATIENT
Start: 2025-02-04 | End: 2025-02-08 | Stop reason: HOSPADM

## 2025-02-04 RX ORDER — ACETAMINOPHEN 500 MG
500 TABLET ORAL EVERY 4 HOURS PRN
Status: DISCONTINUED | OUTPATIENT
Start: 2025-02-04 | End: 2025-02-08 | Stop reason: HOSPADM

## 2025-02-04 RX ORDER — ALBUTEROL SULFATE 0.83 MG/ML
2.5 SOLUTION RESPIRATORY (INHALATION) EVERY 6 HOURS PRN
Status: DISCONTINUED | OUTPATIENT
Start: 2025-02-04 | End: 2025-02-08 | Stop reason: HOSPADM

## 2025-02-04 RX ADMIN — IPRATROPIUM BROMIDE AND ALBUTEROL SULFATE 1 DOSE: 2.5; .5 SOLUTION RESPIRATORY (INHALATION) at 15:23

## 2025-02-04 RX ADMIN — GUAIFENESIN 600 MG: 600 TABLET ORAL at 07:52

## 2025-02-04 RX ADMIN — AZITHROMYCIN MONOHYDRATE 500 MG: 500 INJECTION, POWDER, LYOPHILIZED, FOR SOLUTION INTRAVENOUS at 00:51

## 2025-02-04 RX ADMIN — WATER 60 MG: 1 INJECTION INTRAMUSCULAR; INTRAVENOUS; SUBCUTANEOUS at 22:47

## 2025-02-04 RX ADMIN — BUDESONIDE 500 MCG: 0.5 SUSPENSION RESPIRATORY (INHALATION) at 20:04

## 2025-02-04 RX ADMIN — ENOXAPARIN SODIUM 40 MG: 100 INJECTION SUBCUTANEOUS at 07:53

## 2025-02-04 RX ADMIN — WATER 60 MG: 1 INJECTION INTRAMUSCULAR; INTRAVENOUS; SUBCUTANEOUS at 18:06

## 2025-02-04 RX ADMIN — ARFORMOTEROL TARTRATE 15 MCG: 15 SOLUTION RESPIRATORY (INHALATION) at 11:15

## 2025-02-04 RX ADMIN — ARFORMOTEROL TARTRATE 15 MCG: 15 SOLUTION RESPIRATORY (INHALATION) at 20:04

## 2025-02-04 RX ADMIN — IPRATROPIUM BROMIDE AND ALBUTEROL SULFATE 1 DOSE: 2.5; .5 SOLUTION RESPIRATORY (INHALATION) at 07:40

## 2025-02-04 RX ADMIN — AZITHROMYCIN MONOHYDRATE 500 MG: 500 INJECTION, POWDER, LYOPHILIZED, FOR SOLUTION INTRAVENOUS at 21:40

## 2025-02-04 RX ADMIN — WATER 60 MG: 1 INJECTION INTRAMUSCULAR; INTRAVENOUS; SUBCUTANEOUS at 11:51

## 2025-02-04 RX ADMIN — GUAIFENESIN 600 MG: 600 TABLET ORAL at 00:46

## 2025-02-04 RX ADMIN — BUDESONIDE 500 MCG: 0.5 SUSPENSION RESPIRATORY (INHALATION) at 11:15

## 2025-02-04 RX ADMIN — OSELTAMIVIR PHOSPHATE 75 MG: 75 CAPSULE ORAL at 21:40

## 2025-02-04 RX ADMIN — IPRATROPIUM BROMIDE AND ALBUTEROL SULFATE 1 DOSE: 2.5; .5 SOLUTION RESPIRATORY (INHALATION) at 11:15

## 2025-02-04 RX ADMIN — GUAIFENESIN 600 MG: 600 TABLET ORAL at 21:40

## 2025-02-04 RX ADMIN — METHYLPREDNISOLONE SODIUM SUCCINATE 40 MG: 40 INJECTION, POWDER, FOR SOLUTION INTRAMUSCULAR; INTRAVENOUS at 05:45

## 2025-02-04 RX ADMIN — OSELTAMIVIR PHOSPHATE 75 MG: 75 CAPSULE ORAL at 13:26

## 2025-02-04 RX ADMIN — IPRATROPIUM BROMIDE AND ALBUTEROL SULFATE 1 DOSE: 2.5; .5 SOLUTION RESPIRATORY (INHALATION) at 20:04

## 2025-02-04 ASSESSMENT — PAIN SCALES - GENERAL: PAINLEVEL_OUTOF10: 0

## 2025-02-04 NOTE — H&P
[facility]          [address] [place]  [address]                           HISTORY & PHYSICAL      PATIENT NAME: DURAN HALL            : 1990  MED REC NO: [id number]                       ROOM: ER19  ACCOUNT NO: [id number]                       ADMIT DATE: 2025  PROVIDER: Karol Dooley MD    PRESENTING COMPLAINT:  Cough and wheezing.    HISTORY OF PRESENT ILLNESS:      The patient is a 34-year-old  female, who has previous history significant for mild intermittent asthma.  The patient states she got sick.  Last Tuesday when she went to Urgent Care, where she tested positive for flu A.  She was given 40 mg of prednisone for 5 days without any significant relief.  She went back on Friday and was started on doxycycline, which she is still taking.  Today, she went to Urgent Care and was given another 60 mg of prednisone.  However, she is not feeling better, so came to ER.  In the ER, patient was given breathing treatments and was referred to us to be admitted as she is still wheezing.  The patient says she does not have any fever.  She stays at home and her kids are sick as well.  Chest x-ray showed no acute infiltrate.    PAST MEDICAL HISTORY:  Significant for asthma.    CURRENT MEDICATIONS:  Include doxycycline.    FAMILY HISTORY:  Not significant.    CODE STATUS:  Full code.    PHYSICAL EXAMINATION:  GENERAL:  The patient is a 34-year-old female, not in any acute distress.  VITAL SIGNS:  Temp 98.1, blood pressure 123/76, pulse 83, respiratory rate is 18, saturation is 96%.  HEENT:  Pupils equally reactive to light and accommodation.  NECK:  Supple.  There is no lymphadenopathy or JVD.  CHEST:  Examination reveals bilateral wheezing, but good air entry.  CARDIOVASCULAR SYSTEM: S1 and S2 regular.  No murmur.  No S3.   ABDOMINAL:  No tenderness.  No guarding or rigidity.  CNS:  Nonfocal.    LABORATORY DATA:  White count 5.7, hemoglobin

## 2025-02-04 NOTE — CARE COORDINATION
2/4/2025  10:25 AM     02/04/25 1024   Service Assessment   Patient Orientation Alert and Oriented   Cognition Alert   Primary Caregiver Self   Support Systems Spouse/Significant Other   Social/Functional History   Prior Level of Assist for ADLs Independent   Discharge Planning   Patient expects to be discharged to: House   Services At/After Discharge   Mode of Transport at Discharge Other (see comment)  (Family)   Confirm Follow Up Transport Self     2/4/2025  10:25 AM      ICD-10-CM    1. Exacerbation of asthma, unspecified asthma severity, unspecified whether persistent  J45.901       2. Influenza A  J10.1           General Risk Score: 3   Values used to calculate this score:    Points  Metrics       0        Age: 34       1        Hospital Admissions: 1       2        ED Visits: 2       0        Has Chronic Obstructive Pulmonary Disease: No       0        Has Diabetes: No       0        Has Congestive Heart Failure: No       0        Has Liver Disease: No       0        Has Depression: No       0        Current PCP: Amarilis Mullins MD       0        Has Medicaid: No      CM reviewed EMR. No CM needs indicated at this time.   Estimated discharge date is 2/4/25.   Providers, if needs arise, please consult case management.   ADA Alexander

## 2025-02-04 NOTE — PROGRESS NOTES
[facility]  [address] [place] [address] [phone]      Hospitalist Progress Note      NAME: Nasra Monae   :  1990  MRM:  [id number]    Date of service: 2025  12:52 PM       Assessment and Plan:   Asthma exacerbation.  Likely triggered by influenza.  Patient was on p.o. steroids as an outpatient without much improvement.  Still wheezing and thought that no change since admission.  Continue DuoNeb, Brovana, Pulmicort, Solu-Medrol.  Monitor clinically.  Patient does not require oxygen.    2.  Influenza A.  Start Tamiflu.  Droplet precaution               Subjective:     Chief Complaint:: Patient was seen and examined as a follow up for asthma exacerbation.  Chart was reviewed.  Still complaining of shortness of breath and wheezing    ROS:  (bold if positive, if negative)    Tolerating PT  Tolerating Diet     v   Objective:     Last 24hrs VS reviewed since prior progress note. Most recent are:    Vitals:    25 1212   BP:    Pulse: 89   Resp:    Temp:    SpO2:      SpO2 Readings from Last 6 Encounters:   25 97%   25 97%   10/09/24 99%   10/05/24 96%   10/04/24 97%   24 97%          Intake/Output Summary (Last 24 hours) at 2025 1252  Last data filed at 2025 0224  Gross per 24 hour   Intake 293.98 ml   Output --   Net 293.98 ml        Physical Exam:    Gen:  Well-developed, well-nourished, in no acute distress  HEENT:  Pink conjunctivae, PERRL, hearing intact to voice, moist mucous membranes  Neck:  Supple, without masses, thyroid non-tender  Resp:  No accessory muscle use, bilateral wheezes    Card:  No murmurs, normal S1, S2 without thrills, bruits or peripheral edema  Abd:  Soft, non-tender, non-distended, normoactive bowel sounds are present, no palpable organomegaly and no detectable hernias  Lymph:  No cervical or inguinal adenopathy  Musc:  No cyanosis or clubbing  Skin:  No rashes or ulcers, skin turgor is  good  Neuro:  Cranial nerves are grossly intact, no focal motor weakness, follows commands appropriately  Psych:  Good insight, oriented to person, place and time, alert  __________________________________________________________________  Medications Reviewed: (see below)  Medications:     Current Facility-Administered Medications   Medication Dose Route Frequency    albuterol (PROVENTIL) (2.5 MG/3ML) 0.083% nebulizer solution 2.5 mg  2.5 mg Nebulization Q6H PRN    ondansetron (ZOFRAN) injection 4 mg  4 mg IntraVENous Q6H PRN    acetaminophen (TYLENOL) tablet 500 mg  500 mg Oral Q4H PRN    benzonatate (TESSALON) capsule 100 mg  100 mg Oral TID PRN    methylPREDNISolone sodium succ (SOLU-MEDROL) 60 mg in sterile water 0.96 mL injection  60 mg IntraVENous Q6H    arformoterol tartrate (BROVANA) nebulizer solution 15 mcg  15 mcg Nebulization BID RT    budesonide (PULMICORT) nebulizer suspension 500 mcg  0.5 mg Nebulization BID RT    ipratropium 0.5 mg-albuterol 2.5 mg (DUONEB) nebulizer solution 1 Dose  1 Dose Inhalation Q4H WA RT    azithromycin (ZITHROMAX) 500 mg in sodium chloride 0.9 % 250 mL IVPB ([id number])  500 mg IntraVENous Q24H    guaiFENesin (MUCINEX) extended release tablet 600 mg  600 mg Oral BID    enoxaparin (LOVENOX) injection 40 mg  40 mg SubCUTAneous Daily     Current Outpatient Medications   Medication Sig    doxycycline hyclate (PERIOSTAT) 20 MG tablet Take 1 tablet by mouth 2 times daily    predniSONE (DELTASONE) 20 MG tablet 3 pills p.o. daily x 5 days    albuterol sulfate HFA (VENTOLIN HFA) 108 (90 Base) MCG/ACT inhaler Inhale 2 puffs into the lungs 4 times daily as needed for Wheezing (Patient not taking: Reported on 2/3/2025)    amphetamine-dextroamphetamine (ADDERALL, 10MG,) 10 MG tablet Take 1 tablet by mouth daily for 30 days. Max Daily Amount: 10 mg (Patient not taking: Reported on 2/3/2025)    beclomethasone (QVAR REDIHALER) 40 MCG/ACT AERB inhaler Inhale 1 puff into the lungs in the morning

## 2025-02-04 NOTE — ED TRIAGE NOTES
Pt reports difficulty breathing, chest tightness and wheezing for about a week.   Was diagnose with the FLU last Tuesday, has been on prednisone for 5 days with no relief   Endorses cough, congestion as well   Hx of Asthma

## 2025-02-04 NOTE — ED PROVIDER NOTES
[facility] EMERGENCY DEPARTMENT  EMERGENCY DEPARTMENT ENCOUNTER      Pt Name: Nasra Monae  MRN: [id number]  Birthdate 1990  Date of evaluation: 2/3/2025  Provider: ARVIND Jennings    CHIEF COMPLAINT       Chief Complaint   Patient presents with    Chest Pain    Shortness of Breath         HISTORY OF PRESENT ILLNESS    Patient is a 29-year-old female with history of asthma who presents to ED complaining of difficulty breathing that has been ongoing for the past week.  Reports she initially developed cough, fever, nasal congestion and overall not feeling well 1 week ago.  She was seen at an urgent care and diagnosed with influenza A.  States she was given prednisone 40 mg that she was taking as prescribed as well as using her albuterol inhaler.  She followed up with his she was not having improvement of symptoms and was given doxycycline which she has been taking.  Reports she continues to have chest tightness, cough, shortness of breath and wheezing.  Reports difficulty taking a deep breath.  Her asthma is typically well-controlled and denies any prior admissions for asthma exacerbations.  Denies any abdominal pain, nausea, vomiting, headache, urinary symptoms, syncope.            Review of External Medical Records:     Nursing Notes were reviewed.    REVIEW OF SYSTEMS       Review of Systems   All other systems reviewed and are negative.      Except as noted above the remainder of the review of systems was reviewed and negative.       PAST MEDICAL HISTORY     Past Medical History:   Diagnosis Date    ADD (attention deficit disorder)     Allergic rhinitis, cause unspecified     Asthma     exercise induced    MRSA (methicillin resistant Staphylococcus aureus) carrier          SURGICAL HISTORY       Past Surgical History:   Procedure Laterality Date    ORTHOPEDIC SURGERY  5/2006    orif  left femur metal and 3 pins          CURRENT MEDICATIONS       Previous Medications    ALBUTEROL

## 2025-02-05 LAB — D DIMER PPP FEU-MCNC: 0.29 MG/L FEU (ref 0–0.65)

## 2025-02-05 PROCEDURE — 6360000002 HC RX W HCPCS: Performed by: INTERNAL MEDICINE

## 2025-02-05 PROCEDURE — G0378 HOSPITAL OBSERVATION PER HR: HCPCS

## 2025-02-05 PROCEDURE — 96376 TX/PRO/DX INJ SAME DRUG ADON: CPT

## 2025-02-05 PROCEDURE — 6370000000 HC RX 637 (ALT 250 FOR IP): Performed by: STUDENT IN AN ORGANIZED HEALTH CARE EDUCATION/TRAINING PROGRAM

## 2025-02-05 PROCEDURE — 6360000002 HC RX W HCPCS: Performed by: HOSPITALIST

## 2025-02-05 PROCEDURE — 96366 THER/PROPH/DIAG IV INF ADDON: CPT

## 2025-02-05 PROCEDURE — 2580000003 HC RX 258: Performed by: HOSPITALIST

## 2025-02-05 PROCEDURE — 6370000000 HC RX 637 (ALT 250 FOR IP): Performed by: HOSPITALIST

## 2025-02-05 PROCEDURE — 96372 THER/PROPH/DIAG INJ SC/IM: CPT

## 2025-02-05 PROCEDURE — 85379 FIBRIN DEGRADATION QUANT: CPT

## 2025-02-05 PROCEDURE — 94761 N-INVAS EAR/PLS OXIMETRY MLT: CPT

## 2025-02-05 PROCEDURE — 36415 COLL VENOUS BLD VENIPUNCTURE: CPT

## 2025-02-05 PROCEDURE — 6360000002 HC RX W HCPCS: Performed by: STUDENT IN AN ORGANIZED HEALTH CARE EDUCATION/TRAINING PROGRAM

## 2025-02-05 PROCEDURE — 94640 AIRWAY INHALATION TREATMENT: CPT

## 2025-02-05 PROCEDURE — 6370000000 HC RX 637 (ALT 250 FOR IP): Performed by: INTERNAL MEDICINE

## 2025-02-05 PROCEDURE — 6370000000 HC RX 637 (ALT 250 FOR IP)

## 2025-02-05 PROCEDURE — 2500000003 HC RX 250 WO HCPCS: Performed by: INTERNAL MEDICINE

## 2025-02-05 PROCEDURE — 2500000003 HC RX 250 WO HCPCS: Performed by: STUDENT IN AN ORGANIZED HEALTH CARE EDUCATION/TRAINING PROGRAM

## 2025-02-05 PROCEDURE — 6370000000 HC RX 637 (ALT 250 FOR IP): Performed by: NURSE PRACTITIONER

## 2025-02-05 RX ORDER — ALPRAZOLAM 0.5 MG
0.5 TABLET ORAL 3 TIMES DAILY PRN
Status: DISCONTINUED | OUTPATIENT
Start: 2025-02-05 | End: 2025-02-08 | Stop reason: HOSPADM

## 2025-02-05 RX ORDER — GUAIFENESIN 600 MG/1
1200 TABLET, EXTENDED RELEASE ORAL 2 TIMES DAILY
Status: DISCONTINUED | OUTPATIENT
Start: 2025-02-05 | End: 2025-02-08 | Stop reason: HOSPADM

## 2025-02-05 RX ORDER — IPRATROPIUM BROMIDE AND ALBUTEROL SULFATE 2.5; .5 MG/3ML; MG/3ML
1 SOLUTION RESPIRATORY (INHALATION)
Status: DISCONTINUED | OUTPATIENT
Start: 2025-02-05 | End: 2025-02-06

## 2025-02-05 RX ADMIN — ENOXAPARIN SODIUM 40 MG: 100 INJECTION SUBCUTANEOUS at 08:01

## 2025-02-05 RX ADMIN — IPRATROPIUM BROMIDE AND ALBUTEROL SULFATE 1 DOSE: 2.5; .5 SOLUTION RESPIRATORY (INHALATION) at 11:26

## 2025-02-05 RX ADMIN — ARFORMOTEROL TARTRATE 15 MCG: 15 SOLUTION RESPIRATORY (INHALATION) at 07:49

## 2025-02-05 RX ADMIN — WATER 60 MG: 1 INJECTION INTRAMUSCULAR; INTRAVENOUS; SUBCUTANEOUS at 21:16

## 2025-02-05 RX ADMIN — BENZONATATE 100 MG: 100 CAPSULE ORAL at 21:14

## 2025-02-05 RX ADMIN — ALPRAZOLAM 0.5 MG: 0.5 TABLET ORAL at 21:15

## 2025-02-05 RX ADMIN — OSELTAMIVIR PHOSPHATE 75 MG: 75 CAPSULE ORAL at 21:15

## 2025-02-05 RX ADMIN — IPRATROPIUM BROMIDE AND ALBUTEROL SULFATE 1 DOSE: 2.5; .5 SOLUTION RESPIRATORY (INHALATION) at 07:42

## 2025-02-05 RX ADMIN — GUAIFENESIN 1200 MG: 600 TABLET ORAL at 21:15

## 2025-02-05 RX ADMIN — IPRATROPIUM BROMIDE AND ALBUTEROL SULFATE 1 DOSE: 2.5; .5 SOLUTION RESPIRATORY (INHALATION) at 19:36

## 2025-02-05 RX ADMIN — GUAIFENESIN 600 MG: 600 TABLET ORAL at 08:01

## 2025-02-05 RX ADMIN — BUDESONIDE 500 MCG: 0.5 SUSPENSION RESPIRATORY (INHALATION) at 19:36

## 2025-02-05 RX ADMIN — AZITHROMYCIN MONOHYDRATE 500 MG: 500 INJECTION, POWDER, LYOPHILIZED, FOR SOLUTION INTRAVENOUS at 21:21

## 2025-02-05 RX ADMIN — WATER 60 MG: 1 INJECTION INTRAMUSCULAR; INTRAVENOUS; SUBCUTANEOUS at 05:55

## 2025-02-05 RX ADMIN — IPRATROPIUM BROMIDE AND ALBUTEROL SULFATE 1 DOSE: 2.5; .5 SOLUTION RESPIRATORY (INHALATION) at 16:09

## 2025-02-05 RX ADMIN — BUDESONIDE 500 MCG: 0.5 SUSPENSION RESPIRATORY (INHALATION) at 07:49

## 2025-02-05 RX ADMIN — OSELTAMIVIR PHOSPHATE 75 MG: 75 CAPSULE ORAL at 08:01

## 2025-02-05 RX ADMIN — ARFORMOTEROL TARTRATE 15 MCG: 15 SOLUTION RESPIRATORY (INHALATION) at 19:36

## 2025-02-05 RX ADMIN — WATER 60 MG: 1 INJECTION INTRAMUSCULAR; INTRAVENOUS; SUBCUTANEOUS at 14:40

## 2025-02-05 RX ADMIN — IPRATROPIUM BROMIDE AND ALBUTEROL SULFATE 1 DOSE: 2.5; .5 SOLUTION RESPIRATORY (INHALATION) at 23:36

## 2025-02-05 ASSESSMENT — PAIN SCALES - GENERAL: PAINLEVEL_OUTOF10: 0

## 2025-02-05 NOTE — CONSULTS
Name: Nasra Monae Hospital: [facility]   : 1990 Admit Date: 2/3/2025   Phone: [phone]  Room: [facility]/01   PCP: Amarilis Mullins MD  MRN: [id number]   Date: 2025  Code: Prior        HPI:      Chart and notes reviewed. Data reviewed. I review the patient's current medications in the medical record at each encounter.  I have evaluated and examined the patient.     12:47 PM       History was obtained from patient.    I was asked by Karol Dooley MD to see Nasra Monae in consultation for a chief complaint of influenza.    History of Present Illness:   is a 35 yo lady with a history of mild intermittent asthma that presented to [facility] ED on 2/3 with worsening shortness of breath.  She testedd positive for influenza A at Urgent Care and was given 5 days of prednisone and Doxycycline. Despite this, she did not feel better and presented to the ED.  She continues to feel poorly with wheezing and chest tightness. She has never been hospitalized for asthma and only has an albuterol that she uses as needed, which is rare.  She does typically require steroids when she gets sick.  She denies fever or chills. Denies CP, LE pain/swelling.  She is a nonsmoker.    Images:  CXR personally reviewed.  Hyperinflated lungs. No acute process.      RVP + for influenza A   in 10/2024- 0 on admission here (but had been on prednisone0    Past Medical History:   Diagnosis Date    ADD (attention deficit disorder)     Allergic rhinitis, cause unspecified     Asthma     exercise induced    MRSA (methicillin resistant Staphylococcus aureus) carrier        Past Surgical History:   Procedure Laterality Date    ORTHOPEDIC SURGERY  2006    orif  left femur metal and 3 pins        Family History   Problem Relation Age of Onset    Heart Disease Paternal Grandfather         cad stints    Heart Disease Father         cad with stints    Coronary Art Dis Father     Breast Cancer Mother     Cancer Mother

## 2025-02-05 NOTE — PROGRESS NOTES
[facility]  [address] [place] [address] [phone]      Hospitalist Progress Note      NAME: Nasra Monae   :  1990  MRM:  [id number]    Date of service: 2025  11:35 AM       Assessment and Plan:   #Asthma exacerbation.  Likely triggered by influenza.    Patient was on p.o. steroids as an outpatient without much improvement.    Still wheezing and thought that no change since admission.    Plan  Continue DuoNeb, Brovana, Pulmicort, Solu-Medrol.    Monitor clinically.    Patient does not require oxygen.  I will consult pulmonology to evaluate the patient    #Influenza A.    Tamiflu.  Droplet precaution               Subjective:     Chief Complaint:: Patient was seen and examined as a follow up for asthma exacerbation.  Chart was reviewed.  Still complaining of shortness of breath and wheezing    ROS:  (bold if positive, if negative)    Tolerating PT  Tolerating Diet     v   Objective:     Last 24hrs VS reviewed since prior progress note. Most recent are:    Vitals:    25 0802   BP: 106/62   Pulse: 95   Resp: 16   Temp: 98.2 °F (36.8 °C)   SpO2: 100%     SpO2 Readings from Last 6 Encounters:   25 100%   25 97%   10/09/24 99%   10/05/24 96%   10/04/24 97%   24 97%          Intake/Output Summary (Last 24 hours) at 2025 1135  Last data filed at 2025 0303  Gross per 24 hour   Intake 588.49 ml   Output --   Net 588.49 ml        Physical Exam:    Gen:  Well-developed, well-nourished, in no acute distress  HEENT:  Pink conjunctivae, PERRL, hearing intact to voice, moist mucous membranes  Neck:  Supple, without masses, thyroid non-tender  Resp: Diffuse bilateral expiratory wheezing with decreased air movement   card:  No murmurs, normal S1, S2 without thrills, bruits or peripheral edema  Abd:  Soft, non-tender, non-distended, normoactive bowel sounds are present, no palpable organomegaly and no detectable hernias  Lymph:  No

## 2025-02-06 ENCOUNTER — APPOINTMENT (OUTPATIENT)
Facility: HOSPITAL | Age: 35
DRG: 203 | End: 2025-02-06
Payer: COMMERCIAL

## 2025-02-06 PROBLEM — J10.1 INFLUENZA A: Status: ACTIVE | Noted: 2025-02-06

## 2025-02-06 PROCEDURE — G0378 HOSPITAL OBSERVATION PER HR: HCPCS

## 2025-02-06 PROCEDURE — 94640 AIRWAY INHALATION TREATMENT: CPT

## 2025-02-06 PROCEDURE — 6360000002 HC RX W HCPCS: Performed by: STUDENT IN AN ORGANIZED HEALTH CARE EDUCATION/TRAINING PROGRAM

## 2025-02-06 PROCEDURE — 94761 N-INVAS EAR/PLS OXIMETRY MLT: CPT

## 2025-02-06 PROCEDURE — 6360000002 HC RX W HCPCS: Performed by: INTERNAL MEDICINE

## 2025-02-06 PROCEDURE — 71275 CT ANGIOGRAPHY CHEST: CPT

## 2025-02-06 PROCEDURE — 1100000000 HC RM PRIVATE

## 2025-02-06 PROCEDURE — 6370000000 HC RX 637 (ALT 250 FOR IP): Performed by: NURSE PRACTITIONER

## 2025-02-06 PROCEDURE — 6370000000 HC RX 637 (ALT 250 FOR IP): Performed by: STUDENT IN AN ORGANIZED HEALTH CARE EDUCATION/TRAINING PROGRAM

## 2025-02-06 PROCEDURE — 6370000000 HC RX 637 (ALT 250 FOR IP)

## 2025-02-06 PROCEDURE — 6360000004 HC RX CONTRAST MEDICATION: Performed by: STUDENT IN AN ORGANIZED HEALTH CARE EDUCATION/TRAINING PROGRAM

## 2025-02-06 PROCEDURE — 96376 TX/PRO/DX INJ SAME DRUG ADON: CPT

## 2025-02-06 PROCEDURE — 6370000000 HC RX 637 (ALT 250 FOR IP): Performed by: INTERNAL MEDICINE

## 2025-02-06 PROCEDURE — 2500000003 HC RX 250 WO HCPCS: Performed by: STUDENT IN AN ORGANIZED HEALTH CARE EDUCATION/TRAINING PROGRAM

## 2025-02-06 RX ORDER — IPRATROPIUM BROMIDE AND ALBUTEROL SULFATE 2.5; .5 MG/3ML; MG/3ML
1 SOLUTION RESPIRATORY (INHALATION)
Status: DISCONTINUED | OUTPATIENT
Start: 2025-02-06 | End: 2025-02-08 | Stop reason: HOSPADM

## 2025-02-06 RX ORDER — IOPAMIDOL 755 MG/ML
100 INJECTION, SOLUTION INTRAVASCULAR
Status: COMPLETED | OUTPATIENT
Start: 2025-02-06 | End: 2025-02-06

## 2025-02-06 RX ADMIN — BUDESONIDE 500 MCG: 0.5 SUSPENSION RESPIRATORY (INHALATION) at 19:36

## 2025-02-06 RX ADMIN — BENZONATATE 100 MG: 100 CAPSULE ORAL at 23:38

## 2025-02-06 RX ADMIN — WATER 60 MG: 1 INJECTION INTRAMUSCULAR; INTRAVENOUS; SUBCUTANEOUS at 08:45

## 2025-02-06 RX ADMIN — BENZONATATE 100 MG: 100 CAPSULE ORAL at 15:42

## 2025-02-06 RX ADMIN — IPRATROPIUM BROMIDE AND ALBUTEROL SULFATE 1 DOSE: 2.5; .5 SOLUTION RESPIRATORY (INHALATION) at 13:02

## 2025-02-06 RX ADMIN — GUAIFENESIN 1200 MG: 600 TABLET ORAL at 08:45

## 2025-02-06 RX ADMIN — WATER 60 MG: 1 INJECTION INTRAMUSCULAR; INTRAVENOUS; SUBCUTANEOUS at 03:21

## 2025-02-06 RX ADMIN — WATER 60 MG: 1 INJECTION INTRAMUSCULAR; INTRAVENOUS; SUBCUTANEOUS at 15:42

## 2025-02-06 RX ADMIN — ARFORMOTEROL TARTRATE 15 MCG: 15 SOLUTION RESPIRATORY (INHALATION) at 19:36

## 2025-02-06 RX ADMIN — GUAIFENESIN 1200 MG: 600 TABLET ORAL at 21:20

## 2025-02-06 RX ADMIN — IPRATROPIUM BROMIDE AND ALBUTEROL SULFATE 1 DOSE: 2.5; .5 SOLUTION RESPIRATORY (INHALATION) at 19:36

## 2025-02-06 RX ADMIN — BUDESONIDE 500 MCG: 0.5 SUSPENSION RESPIRATORY (INHALATION) at 08:12

## 2025-02-06 RX ADMIN — IPRATROPIUM BROMIDE AND ALBUTEROL SULFATE 1 DOSE: 2.5; .5 SOLUTION RESPIRATORY (INHALATION) at 03:33

## 2025-02-06 RX ADMIN — ALPRAZOLAM 0.5 MG: 0.5 TABLET ORAL at 21:19

## 2025-02-06 RX ADMIN — WATER 60 MG: 1 INJECTION INTRAMUSCULAR; INTRAVENOUS; SUBCUTANEOUS at 21:21

## 2025-02-06 RX ADMIN — OSELTAMIVIR PHOSPHATE 75 MG: 75 CAPSULE ORAL at 21:20

## 2025-02-06 RX ADMIN — IOPAMIDOL 70 ML: 755 INJECTION, SOLUTION INTRAVENOUS at 17:07

## 2025-02-06 RX ADMIN — ARFORMOTEROL TARTRATE 15 MCG: 15 SOLUTION RESPIRATORY (INHALATION) at 08:12

## 2025-02-06 RX ADMIN — OSELTAMIVIR PHOSPHATE 75 MG: 75 CAPSULE ORAL at 08:45

## 2025-02-06 RX ADMIN — IPRATROPIUM BROMIDE AND ALBUTEROL SULFATE 1 DOSE: 2.5; .5 SOLUTION RESPIRATORY (INHALATION) at 08:09

## 2025-02-06 ASSESSMENT — PAIN SCALES - GENERAL
PAINLEVEL_OUTOF10: 0
PAINLEVEL_OUTOF10: 0

## 2025-02-06 NOTE — PROGRESS NOTES
Name: Nasra Monae Hospital: [facility]   : 1990 Admit Date: 2/3/2025   Phone: [phone]  Room: [facility]/01   PCP: Amarilis Mullins MD  MRN: [id number]   Date: 2025  Code: Prior        HPI:      Chart and notes reviewed. Data reviewed. I review the patient's current medications in the medical record at each encounter.  I have evaluated and examined the patient.     9:19 AM       History was obtained from patient.    I was asked by Karol Dooley MD to see Nasra Monae in consultation for a chief complaint of influenza.    History of Present Illness:   is a 35 yo lady with a history of mild intermittent asthma that presented to [facility] ED on 2/3 with worsening shortness of breath.  She testedd positive for influenza A at Urgent Care and was given 5 days of prednisone and Doxycycline. Despite this, she did not feel better and presented to the ED.  She continues to feel poorly with wheezing and chest tightness. She has never been hospitalized for asthma and only has an albuterol that she uses as needed, which is rare.  She does typically require steroids when she gets sick.  She denies fever or chills. Denies CP, LE pain/swelling.  She is a nonsmoker.    Images:  CXR personally reviewed.  Hyperinflated lungs. No acute process.      RVP + for influenza A   in 10/2024- 0 on admission here (but had been on prednisone)    Interval history:  Still with significant wheezing, chest tightness and cough.    Past Medical History:   Diagnosis Date    ADD (attention deficit disorder)     Allergic rhinitis, cause unspecified     Asthma     exercise induced    MRSA (methicillin resistant Staphylococcus aureus) carrier        Past Surgical History:   Procedure Laterality Date    ORTHOPEDIC SURGERY  2006    orif  left femur metal and 3 pins        Family History   Problem Relation Age of Onset    Heart Disease Paternal Grandfather         cad stints    Heart Disease Father         cad    Chest wall:  No tenderness or deformity.   Heart:  Regular rate and rhythm, S1, S2 normal, no murmur, click, rub or gallop.   Abdomen:   Soft, non-tender. Bowel sounds normal.    Extremities: Extremities normal, atraumatic, no cyanosis or edema.   Pulses: 2+ and symmetric all extremities.   Skin: Skin color, texture, turgor normal.    Lymph nodes: Cervical nodes normal.   Neurologic: Grossly nonfocal       Lab Results   Component Value Date/Time     02/04/2025 03:35 AM    K 4.1 02/04/2025 03:35 AM     02/04/2025 03:35 AM    CO2 23 02/04/2025 03:35 AM    BUN 8 02/04/2025 03:35 AM    MG 2.4 02/03/2025 07:48 PM       Lab Results   Component Value Date/Time    WBC 6.5 02/04/2025 03:35 AM    HGB 13.0 02/04/2025 03:35 AM     02/04/2025 03:35 AM    MCV 88.2 02/04/2025 03:35 AM       Lab Results   Component Value Date/Time    GLOB 4.4 02/03/2025 07:48 PM       IMPRESSION  Acute Asthma Exacerbation  Influenza    PLAN  Suppplemental O2 to keep sats >90%, currently on RA  Tamiflu  Brovana/Pulmicort; would send home on Symbicort or its equivalent as well as airsupra  Scheduled Duo-Nebs  Continue IV steroids at current dose today, will evaluated for de-escalation tomorrow vs transition to PO   Mucinex, triny  Has completed a course of Doxy and Azithromycin  CTA if she fails to improve, check d-dimer  Would benefit from outpatient follow-up.  Lovenox    Thank you very much for the consult.      Mari Hinkle MD

## 2025-02-06 NOTE — PROGRESS NOTES
[facility]  [address] [place] [address] [phone]      Hospitalist Progress Note      NAME: Nasra Monae   :  1990  MRM:  [id number]    Date of service: 2025  10:22 AM       Assessment and Plan:   #Asthma exacerbation.  Likely triggered by influenza.    Patient was on p.o. steroids as an outpatient without much improvement.    Still wheezing and thought that no change since admission.   Eval by pulm today, and recommended another day of IV steroids    Plan  Continue DuoNeb, Brovana, Pulmicort, Solu-Medrol.    Monitor clinically.    Patient does not require oxygen.  Pulm on board and following    #Influenza A.    Tamiflu.    Droplet precaution               Subjective:     Chief Complaint:: Patient was seen and examined as a follow up for asthma exacerbation.  Chart was reviewed.  Still complaining of shortness of breath and wheezing    ROS:  (bold if positive, if negative)    Tolerating PT  Tolerating Diet     v   Objective:     Last 24hrs VS reviewed since prior progress note. Most recent are:    Vitals:    25 0829   BP: 112/67   Pulse: (!) 103   Resp: 14   Temp: 98.2 °F (36.8 °C)   SpO2: 94%     SpO2 Readings from Last 6 Encounters:   25 94%   25 97%   10/09/24 99%   10/05/24 96%   10/04/24 97%   24 97%          Intake/Output Summary (Last 24 hours) at 2025 1022  Last data filed at 2025 2221  Gross per 24 hour   Intake 400 ml   Output --   Net 400 ml        Physical Exam:    Gen:  Well-developed, well-nourished, in no acute distress  HEENT:  Pink conjunctivae, PERRL, hearing intact to voice, moist mucous membranes  Neck:  Supple, without masses, thyroid non-tender  Resp: Diffuse bilateral expiratory wheezing with decreased air movement has improved from yesterday  card:  No murmurs, normal S1, S2 without thrills, bruits or peripheral edema  Abd:  Soft, non-tender, non-distended, normoactive bowel sounds are present,  no palpable organomegaly and no detectable hernias  Lymph:  No cervical or inguinal adenopathy  Musc:  No cyanosis or clubbing  Skin:  No rashes or ulcers, skin turgor is good  Neuro:  Cranial nerves are grossly intact, no focal motor weakness, follows commands appropriately  Psych:  Good insight, oriented to person, place and time, alert  __________________________________________________________________  Medications Reviewed: (see below)  Medications:     Current Facility-Administered Medications   Medication Dose Route Frequency    ipratropium 0.5 mg-albuterol 2.5 mg (DUONEB) nebulizer solution 1 Dose  1 Dose Inhalation Q6H RT    guaiFENesin (MUCINEX) extended release tablet 1,200 mg  1,200 mg Oral BID    methylPREDNISolone sodium succ (SOLU-MEDROL) 60 mg in sterile water 0.96 mL injection  60 mg IntraVENous Q6H    ALPRAZolam (XANAX) tablet 0.5 mg  0.5 mg Oral TID PRN    albuterol (PROVENTIL) (2.5 MG/3ML) 0.083% nebulizer solution 2.5 mg  2.5 mg Nebulization Q6H PRN    ondansetron (ZOFRAN) injection 4 mg  4 mg IntraVENous Q6H PRN    acetaminophen (TYLENOL) tablet 500 mg  500 mg Oral Q4H PRN    benzonatate (TESSALON) capsule 100 mg  100 mg Oral TID PRN    arformoterol tartrate (BROVANA) nebulizer solution 15 mcg  15 mcg Nebulization BID RT    budesonide (PULMICORT) nebulizer suspension 500 mcg  0.5 mg Nebulization BID RT    oseltamivir (TAMIFLU) capsule 75 mg  75 mg Oral BID    enoxaparin (LOVENOX) injection 40 mg  40 mg SubCUTAneous Daily        Lab Data Reviewed: (see below)  Lab Review:     Recent Labs     02/03/25 1948 02/04/25 0335   WBC 5.7 6.5   HGB 13.8 13.0   HCT 41.4 39.8    258     Recent Labs     02/03/25 1948 02/04/25  0335    139   K 4.3 4.1    109*   CO2 27 23   BUN 11 8   MG 2.4  --    ALT 46  --      No results found for: \"GLUCPOC\"  No results for input(s): \"PH\", \"PCO2\", \"PO2\", \"HCO3\", \"FIO2\" in the last 72 hours.  No results for input(s): \"INR\" in the last 72

## 2025-02-07 PROCEDURE — 6370000000 HC RX 637 (ALT 250 FOR IP): Performed by: INTERNAL MEDICINE

## 2025-02-07 PROCEDURE — 6370000000 HC RX 637 (ALT 250 FOR IP): Performed by: STUDENT IN AN ORGANIZED HEALTH CARE EDUCATION/TRAINING PROGRAM

## 2025-02-07 PROCEDURE — 6370000000 HC RX 637 (ALT 250 FOR IP): Performed by: NURSE PRACTITIONER

## 2025-02-07 PROCEDURE — 2500000003 HC RX 250 WO HCPCS: Performed by: INTERNAL MEDICINE

## 2025-02-07 PROCEDURE — 6360000002 HC RX W HCPCS: Performed by: INTERNAL MEDICINE

## 2025-02-07 PROCEDURE — 1100000000 HC RM PRIVATE

## 2025-02-07 PROCEDURE — 94669 MECHANICAL CHEST WALL OSCILL: CPT

## 2025-02-07 PROCEDURE — 6370000000 HC RX 637 (ALT 250 FOR IP)

## 2025-02-07 PROCEDURE — 2500000003 HC RX 250 WO HCPCS: Performed by: STUDENT IN AN ORGANIZED HEALTH CARE EDUCATION/TRAINING PROGRAM

## 2025-02-07 PROCEDURE — 94640 AIRWAY INHALATION TREATMENT: CPT

## 2025-02-07 PROCEDURE — 94761 N-INVAS EAR/PLS OXIMETRY MLT: CPT

## 2025-02-07 PROCEDURE — 6360000002 HC RX W HCPCS: Performed by: STUDENT IN AN ORGANIZED HEALTH CARE EDUCATION/TRAINING PROGRAM

## 2025-02-07 RX ADMIN — BUDESONIDE 500 MCG: 0.5 SUSPENSION RESPIRATORY (INHALATION) at 19:58

## 2025-02-07 RX ADMIN — WATER 40 MG: 1 INJECTION INTRAMUSCULAR; INTRAVENOUS; SUBCUTANEOUS at 16:21

## 2025-02-07 RX ADMIN — ALPRAZOLAM 0.5 MG: 0.5 TABLET ORAL at 20:39

## 2025-02-07 RX ADMIN — WATER 60 MG: 1 INJECTION INTRAMUSCULAR; INTRAVENOUS; SUBCUTANEOUS at 03:22

## 2025-02-07 RX ADMIN — GUAIFENESIN 1200 MG: 600 TABLET ORAL at 20:39

## 2025-02-07 RX ADMIN — BUDESONIDE 500 MCG: 0.5 SUSPENSION RESPIRATORY (INHALATION) at 07:36

## 2025-02-07 RX ADMIN — IPRATROPIUM BROMIDE AND ALBUTEROL SULFATE 1 DOSE: 2.5; .5 SOLUTION RESPIRATORY (INHALATION) at 07:41

## 2025-02-07 RX ADMIN — WATER 60 MG: 1 INJECTION INTRAMUSCULAR; INTRAVENOUS; SUBCUTANEOUS at 08:01

## 2025-02-07 RX ADMIN — OSELTAMIVIR PHOSPHATE 75 MG: 75 CAPSULE ORAL at 20:38

## 2025-02-07 RX ADMIN — BENZONATATE 100 MG: 100 CAPSULE ORAL at 20:39

## 2025-02-07 RX ADMIN — ARFORMOTEROL TARTRATE 15 MCG: 15 SOLUTION RESPIRATORY (INHALATION) at 07:36

## 2025-02-07 RX ADMIN — ARFORMOTEROL TARTRATE 15 MCG: 15 SOLUTION RESPIRATORY (INHALATION) at 19:58

## 2025-02-07 RX ADMIN — IPRATROPIUM BROMIDE AND ALBUTEROL SULFATE 1 DOSE: 2.5; .5 SOLUTION RESPIRATORY (INHALATION) at 01:22

## 2025-02-07 RX ADMIN — IPRATROPIUM BROMIDE AND ALBUTEROL SULFATE 1 DOSE: 2.5; .5 SOLUTION RESPIRATORY (INHALATION) at 19:58

## 2025-02-07 RX ADMIN — GUAIFENESIN 1200 MG: 600 TABLET ORAL at 08:01

## 2025-02-07 RX ADMIN — OSELTAMIVIR PHOSPHATE 75 MG: 75 CAPSULE ORAL at 08:00

## 2025-02-07 RX ADMIN — IPRATROPIUM BROMIDE AND ALBUTEROL SULFATE 1 DOSE: 2.5; .5 SOLUTION RESPIRATORY (INHALATION) at 13:51

## 2025-02-07 ASSESSMENT — PAIN SCALES - GENERAL
PAINLEVEL_OUTOF10: 0
PAINLEVEL_OUTOF10: 0

## 2025-02-07 NOTE — PROGRESS NOTES
Name: Nasra Monae Hospital: [facility]   : 1990 Admit Date: 2/3/2025   Phone: [phone]  Room: [facility]/01   PCP: Amarilis Mullins MD  MRN: [id number]   Date: 2025  Code: Full Code        HPI:      Chart and notes reviewed. Data reviewed. I review the patient's current medications in the medical record at each encounter.  I have evaluated and examined the patient.     11:53 AM       History was obtained from patient.    I was asked by Karol Dooley MD to see Nasra Monae in consultation for a chief complaint of influenza.    History of Present Illness:   is a 33 yo lady with a history of mild intermittent asthma that presented to [facility] ED on 2/3 with worsening shortness of breath.  She testedd positive for influenza A at Urgent Care and was given 5 days of prednisone and Doxycycline. Despite this, she did not feel better and presented to the ED.  She continues to feel poorly with wheezing and chest tightness. She has never been hospitalized for asthma and only has an albuterol that she uses as needed, which is rare.  She does typically require steroids when she gets sick.  She denies fever or chills. Denies CP, LE pain/swelling.  She is a nonsmoker.    Images:  CXR personally reviewed.  Hyperinflated lungs. No acute process.      RVP + for influenza A   in 10/2024- 0 on admission here (but had been on prednisone)    Interval history:  No acute events. Feels better, still short of breath with wheezing and cough.    Past Medical History:   Diagnosis Date    ADD (attention deficit disorder)     Allergic rhinitis, cause unspecified     Asthma     exercise induced    MRSA (methicillin resistant Staphylococcus aureus) carrier        Past Surgical History:   Procedure Laterality Date    ORTHOPEDIC SURGERY  2006    orif  left femur metal and 3 pins        Family History   Problem Relation Age of Onset    Heart Disease Paternal Grandfather         cad stints    Heart

## 2025-02-07 NOTE — PLAN OF CARE
Problem: Pain  Goal: Verbalizes/displays adequate comfort level or baseline comfort level  2/7/2025 0946 by Nelida Gupta RN  Outcome: Progressing  2/6/2025 2129 by Radha Sol RN  Outcome: Progressing     Problem: Respiratory - Adult  Goal: Achieves optimal ventilation and oxygenation  2/7/2025 0946 by Nelida Gupta RN  Outcome: Progressing  2/7/2025 0358 by Yesenia Gomez RCP  Outcome: Progressing  2/6/2025 2129 by Radha Sol RN  Outcome: Progressing

## 2025-02-07 NOTE — PLAN OF CARE
Problem: Chronic Conditions and Co-morbidities  Goal: Patient's chronic conditions and co-morbidity symptoms are monitored and maintained or improved  Outcome: Progressing  Goal: Able to breathe comfortably  Description: Able to breathe comfortably  Outcome: Progressing     Problem: Chronic Conditions and Co-morbidities  Goal: Able to breathe comfortably  Description: Able to breathe comfortably  Outcome: Progressing     Problem: Respiratory - Adult  Goal: Achieves optimal ventilation and oxygenation  2/6/2025 2129 by Radha Sol RN  Outcome: Progressing  2/6/2025 1000 by Romelia Peterson, RT  Outcome: Progressing     Problem: Pain  Goal: Verbalizes/displays adequate comfort level or baseline comfort level  Outcome: Progressing

## 2025-02-07 NOTE — PLAN OF CARE
Problem: Respiratory - Adult  Goal: Achieves optimal ventilation and oxygenation  2/7/2025 0358 by Yesenia Gomez RCP  Outcome: Progressing  2/6/2025 2129 by Radha Sol RN  Outcome: Progressing

## 2025-02-07 NOTE — PROGRESS NOTES
[facility]  [address] [place] [address]  [phone]      Hospitalist  Progress Note      NAME:       Nasra Monae   :        1990  MRM:        [id number]    Date of service: 2025      Subjective: Patient seen and examined by me. Patient admitted with asthma with acute exacerbation. She feels better but still has SOB with exertion. Associated with wheezing. Cough. No fever or chills      Objective:    Vital Signs:    /77   Pulse (!) 108   Temp 97.7 °F (36.5 °C) (Oral)   Resp 14   Ht 1.676 m (5' 6\")   Wt 63 kg (139 lb)   SpO2 98%   BMI 22.44 kg/m²        Intake/Output Summary (Last 24 hours) at 2025 0912  Last data filed at 2025 2128  Gross per 24 hour   Intake 1000 ml   Output --   Net 1000 ml        Current inpatient medications reviewed:  Current Facility-Administered Medications   Medication Dose Route Frequency    ipratropium 0.5 mg-albuterol 2.5 mg (DUONEB) nebulizer solution 1 Dose  1 Dose Inhalation Q6H RT    guaiFENesin (MUCINEX) extended release tablet 1,200 mg  1,200 mg Oral BID    methylPREDNISolone sodium succ (SOLU-MEDROL) 60 mg in sterile water 0.96 mL injection  60 mg IntraVENous Q6H    ALPRAZolam (XANAX) tablet 0.5 mg  0.5 mg Oral TID PRN    albuterol (PROVENTIL) (2.5 MG/3ML) 0.083% nebulizer solution 2.5 mg  2.5 mg Nebulization Q6H PRN    ondansetron (ZOFRAN) injection 4 mg  4 mg IntraVENous Q6H PRN    acetaminophen (TYLENOL) tablet 500 mg  500 mg Oral Q4H PRN    benzonatate (TESSALON) capsule 100 mg  100 mg Oral TID PRN    arformoterol tartrate (BROVANA) nebulizer solution 15 mcg  15 mcg Nebulization BID RT    budesonide (PULMICORT) nebulizer suspension 500 mcg  0.5 mg Nebulization BID RT    oseltamivir (TAMIFLU) capsule 75 mg  75 mg Oral BID    enoxaparin (LOVENOX) injection 40 mg  40 mg SubCUTAneous Daily       Physical Examination:    General:   Weak

## 2025-02-08 VITALS
TEMPERATURE: 100 F | WEIGHT: 139 LBS | DIASTOLIC BLOOD PRESSURE: 77 MMHG | BODY MASS INDEX: 22.34 KG/M2 | HEART RATE: 93 BPM | HEIGHT: 66 IN | RESPIRATION RATE: 16 BRPM | SYSTOLIC BLOOD PRESSURE: 118 MMHG | OXYGEN SATURATION: 96 %

## 2025-02-08 PROCEDURE — 6370000000 HC RX 637 (ALT 250 FOR IP): Performed by: STUDENT IN AN ORGANIZED HEALTH CARE EDUCATION/TRAINING PROGRAM

## 2025-02-08 PROCEDURE — 6360000002 HC RX W HCPCS: Performed by: INTERNAL MEDICINE

## 2025-02-08 PROCEDURE — 94761 N-INVAS EAR/PLS OXIMETRY MLT: CPT

## 2025-02-08 PROCEDURE — 94669 MECHANICAL CHEST WALL OSCILL: CPT

## 2025-02-08 PROCEDURE — 2500000003 HC RX 250 WO HCPCS: Performed by: INTERNAL MEDICINE

## 2025-02-08 PROCEDURE — 94640 AIRWAY INHALATION TREATMENT: CPT

## 2025-02-08 PROCEDURE — 6370000000 HC RX 637 (ALT 250 FOR IP): Performed by: INTERNAL MEDICINE

## 2025-02-08 RX ORDER — PREDNISONE 10 MG/1
TABLET ORAL
Qty: 30 TABLET | Refills: 0 | Status: SHIPPED | OUTPATIENT
Start: 2025-02-09 | End: 2025-02-21

## 2025-02-08 RX ORDER — IPRATROPIUM BROMIDE AND ALBUTEROL SULFATE 2.5; .5 MG/3ML; MG/3ML
3 SOLUTION RESPIRATORY (INHALATION)
Qty: 180 ML | Refills: 0 | Status: SHIPPED | OUTPATIENT
Start: 2025-02-08 | End: 2025-02-23

## 2025-02-08 RX ORDER — BENZONATATE 200 MG/1
200 CAPSULE ORAL 3 TIMES DAILY PRN
Qty: 20 CAPSULE | Refills: 0 | Status: SHIPPED | OUTPATIENT
Start: 2025-02-08

## 2025-02-08 RX ORDER — OSELTAMIVIR PHOSPHATE 75 MG/1
75 CAPSULE ORAL 2 TIMES DAILY
Qty: 1 CAPSULE | Refills: 0 | Status: SHIPPED | OUTPATIENT
Start: 2025-02-08 | End: 2025-02-09

## 2025-02-08 RX ADMIN — ARFORMOTEROL TARTRATE 15 MCG: 15 SOLUTION RESPIRATORY (INHALATION) at 07:24

## 2025-02-08 RX ADMIN — BUDESONIDE 500 MCG: 0.5 SUSPENSION RESPIRATORY (INHALATION) at 07:24

## 2025-02-08 RX ADMIN — OSELTAMIVIR PHOSPHATE 75 MG: 75 CAPSULE ORAL at 08:08

## 2025-02-08 RX ADMIN — WATER 40 MG: 1 INJECTION INTRAMUSCULAR; INTRAVENOUS; SUBCUTANEOUS at 00:45

## 2025-02-08 RX ADMIN — WATER 40 MG: 1 INJECTION INTRAMUSCULAR; INTRAVENOUS; SUBCUTANEOUS at 08:08

## 2025-02-08 RX ADMIN — IPRATROPIUM BROMIDE AND ALBUTEROL SULFATE 1 DOSE: 2.5; .5 SOLUTION RESPIRATORY (INHALATION) at 07:24

## 2025-02-08 ASSESSMENT — PAIN SCALES - GENERAL: PAINLEVEL_OUTOF10: 0

## 2025-02-08 NOTE — DISCHARGE INSTRUCTIONS
Hospital Medicine DISCHARGE INSTRUCTIONS    NAME: Nasra Monae   :  1990   MRN:  [id number]     Date:     2025    Admission date: 2/3/2025     Discharge date:  2025     Reason for your admission:  Influenza A [J10.1]  Asthma exacerbation attacks [J45.901]    Discharge Diagnoses:  As above    DISCHARGE INSTRUCTIONS:    Thank you for allowing us to participate in your care. Your discharging Hospitalist is Tom Willson MD. You were admitted for evaluation and treatment for the above diagnoses.    Medications:     It is important that medications are taken exactly as they are prescribed on the discharge medication instructions and keep them your  in the bottles provided by the pharmacist.   Keep a list of the medication names, dosages, and times to be taken at all times.    Do not take other medications without consulting your doctor.     Recommended diet:  regular diet    Recommended activity: activity as tolerated    Post discharge care:    For questions regarding your Hospitalization or to contact the Hospital Medicine team, please call [phone].    Notify follow up health care provider or return to the emergency department if you cannot get hold of your doctor if you feel worse or experience symptoms similar to those that brought you to hospital    Amarilis Mullins MD  [address] [place] [address]  [phone]    Schedule an appointment as soon as possible for a visit  to schedule a regular follow up after discharge       Information obtained by :  I understand that if any problems occur once I am at home I am to contact my physician and I understand and acknowledge receipt of the instructions indicated above.                                                                                                                                           Physician's or R.N.'s Signature                                                                  Date/Time

## 2025-02-08 NOTE — DISCHARGE SUMMARY
[facility]  [address] [place] [address]  Tel: [phone]    Hospital Medicine Discharge Summary    Patient ID:    Nasra Monae  Age:              34 y.o.    : 1990  MRN:             [id number]     PCP: Amarilis Mullins MD     Date of Admission: 2/3/2025    Date of Discharge:  2025    Discharge Diagnoses:  Principal Problem:    Asthma with acute exacerbation    Influenza A    Attention deficit hyperactivity disorder (ADHD), predominantly inattentive type      Reason for admission:    Influenza A [J10.1]  Asthma exacerbation attacks [J45.901]  Exacerbation of asthma, unspecified asthma severity, unspecified whether persistent [J45.901]    Diagnostic testing:    Laboratory data reviewed and independently interpreted:    No results for input(s): \"WBC\", \"HGB\", \"HCT\", \"RBC\", \"MCV\", \"MCH\", \"PLT\" in the last 72 hours.  No results found for: \"LACTA\"  No results for input(s): \"NA\", \"K\", \"CL\", \"CO2\", \"GLUCOSE\", \"BUN\", \"CREATININE\", \"CALCIUM\", \"MG\", \"PHOS\", \"BILITOT\", \"ALKPHOS\", \"AST\", \"ALT\", \"INR\" in the last 72 hours.    Invalid input(s): \"PROT\", \"ALB\"  No components found for: \"GLUCOSEPOC\"  Lab Results   Component Value Date/Time    CHOL 161 2020 10:33 AM    TRIG 57 2020 10:33 AM    HDL 54 2020 10:33 AM     Imaging data reviewed:    CTA CHEST W WO CONTRAST    Result Date: 2025  Unremarkable chest CTA. Electronically signed by HEENA HOWE    XR CHEST (2 VW)    Result Date: 2/3/2025  No acute abnormality Electronically signed by [facility] Course:     Ms. Monae is a 34 y.o. admitted to [facility] and treated for the following:    Asthma with acute exacerbation POA: triggered by flu A infection. Symptoms better. She is afebrile. Not hypoxic. Has been on IV Solumedrol, Brovana, Pulmicort, Mucinex. Discharged home in stable condition on DuoNeb PRN (she has a machine), Albuterol inhaler

## 2025-02-08 NOTE — PLAN OF CARE
Problem: Chronic Conditions and Co-morbidities  Goal: Patient's chronic conditions and co-morbidity symptoms are monitored and maintained or improved  Outcome: Progressing  Goal: Able to breathe comfortably  Description: Able to breathe comfortably  Outcome: Progressing     Problem: Chronic Conditions and Co-morbidities  Goal: Able to breathe comfortably  Description: Able to breathe comfortably  Outcome: Progressing     Problem: Respiratory - Adult  Goal: Achieves optimal ventilation and oxygenation  2/7/2025 2123 by Radha Sol RN  Outcome: Progressing  2/7/2025 0946 by Nelida Gupta RN  Outcome: Progressing     Problem: Pain  Goal: Verbalizes/displays adequate comfort level or baseline comfort level  2/7/2025 2123 by Radha Sol RN  Outcome: Progressing  2/7/2025 0946 by Nelida Gupta RN  Outcome: Progressing

## 2025-02-18 ENCOUNTER — OFFICE VISIT (OUTPATIENT)
Age: 35
End: 2025-02-18

## 2025-02-18 VITALS
WEIGHT: 137.2 LBS | RESPIRATION RATE: 16 BRPM | TEMPERATURE: 97 F | HEART RATE: 92 BPM | HEIGHT: 66 IN | OXYGEN SATURATION: 98 % | BODY MASS INDEX: 22.05 KG/M2 | DIASTOLIC BLOOD PRESSURE: 69 MMHG | SYSTOLIC BLOOD PRESSURE: 103 MMHG

## 2025-02-18 DIAGNOSIS — Z09 HOSPITAL DISCHARGE FOLLOW-UP: Primary | ICD-10-CM

## 2025-02-18 DIAGNOSIS — J45.21 MILD INTERMITTENT ASTHMA WITH ACUTE EXACERBATION: ICD-10-CM

## 2025-02-18 DIAGNOSIS — M54.50 ACUTE MIDLINE LOW BACK PAIN WITHOUT SCIATICA: ICD-10-CM

## 2025-02-18 ASSESSMENT — PATIENT HEALTH QUESTIONNAIRE - PHQ9
1. LITTLE INTEREST OR PLEASURE IN DOING THINGS: NOT AT ALL
SUM OF ALL RESPONSES TO PHQ QUESTIONS 1-9: 0
SUM OF ALL RESPONSES TO PHQ9 QUESTIONS 1 & 2: 0
SUM OF ALL RESPONSES TO PHQ QUESTIONS 1-9: 0
2. FEELING DOWN, DEPRESSED OR HOPELESS: NOT AT ALL

## 2025-02-18 ASSESSMENT — ENCOUNTER SYMPTOMS
WHEEZING: 0
BACK PAIN: 1

## 2025-02-18 NOTE — PROGRESS NOTES
inhaler Inhale 2 puffs into the lungs 4 times daily as needed for Wheezing 18 g 1        Medications patient taking as of now reconciled against medications ordered at time of hospital discharge: Yes    Review of Systems   Respiratory:  Negative for wheezing.    Musculoskeletal:  Positive for back pain.       Objective:    /69   Pulse 92   Temp 97 °F (36.1 °C) (Temporal)   Resp 16   Ht 1.676 m (5' 6\")   Wt 62.2 kg (137 lb 3.2 oz)   SpO2 98%   BMI 22.14 kg/m²   Physical Exam  Constitutional:       Appearance: Normal appearance.   Cardiovascular:      Rate and Rhythm: Normal rate and regular rhythm.   Pulmonary:      Effort: Pulmonary effort is normal.      Breath sounds: Normal breath sounds. No wheezing.   Musculoskeletal:      Lumbar back: Spasms and tenderness (midline low back pain, extending to SI joints; worse with going from sitting to standing) present.   Skin:     General: Skin is warm and dry.   Neurological:      General: No focal deficit present.      Mental Status: She is alert and oriented to person, place, and time.   Psychiatric:         Mood and Affect: Mood normal.         Behavior: Behavior normal.         An electronic signature was used to authenticate this note.  --CAMRON Perez - NP

## 2025-03-08 PROBLEM — J10.1 INFLUENZA A: Status: RESOLVED | Noted: 2025-02-06 | Resolved: 2025-03-08

## 2025-04-17 ENCOUNTER — PATIENT MESSAGE (OUTPATIENT)
Age: 35
End: 2025-04-17

## 2025-04-17 NOTE — TELEPHONE ENCOUNTER
Called and spoke with patient. Scheduled 1 month follow up with NP. Patient will request refills at appt.

## 2025-04-18 ENCOUNTER — TELEMEDICINE (OUTPATIENT)
Age: 35
End: 2025-04-18
Payer: COMMERCIAL

## 2025-04-18 DIAGNOSIS — F90.0 ATTENTION DEFICIT HYPERACTIVITY DISORDER (ADHD), PREDOMINANTLY INATTENTIVE TYPE: Primary | ICD-10-CM

## 2025-04-18 DIAGNOSIS — J45.20 MILD INTERMITTENT ASTHMA, UNSPECIFIED WHETHER COMPLICATED: ICD-10-CM

## 2025-04-18 PROCEDURE — 99213 OFFICE O/P EST LOW 20 MIN: CPT | Performed by: NURSE PRACTITIONER

## 2025-04-18 PROCEDURE — G8427 DOCREV CUR MEDS BY ELIG CLIN: HCPCS | Performed by: NURSE PRACTITIONER

## 2025-04-18 RX ORDER — IPRATROPIUM BROMIDE AND ALBUTEROL SULFATE 2.5; .5 MG/3ML; MG/3ML
3 SOLUTION RESPIRATORY (INHALATION)
Qty: 180 ML | Refills: 0 | Status: SHIPPED | OUTPATIENT
Start: 2025-04-18 | End: 2025-05-03

## 2025-04-18 RX ORDER — DEXTROAMPHETAMINE SACCHARATE, AMPHETAMINE ASPARTATE, DEXTROAMPHETAMINE SULFATE AND AMPHETAMINE SULFATE 2.5; 2.5; 2.5; 2.5 MG/1; MG/1; MG/1; MG/1
10 TABLET ORAL DAILY
Qty: 30 TABLET | Refills: 0 | Status: SHIPPED | OUTPATIENT
Start: 2025-06-17 | End: 2025-07-17

## 2025-04-18 RX ORDER — ALBUTEROL SULFATE 90 UG/1
2 INHALANT RESPIRATORY (INHALATION) 4 TIMES DAILY PRN
Qty: 18 G | Refills: 1 | Status: SHIPPED | OUTPATIENT
Start: 2025-04-18

## 2025-04-18 RX ORDER — DEXTROAMPHETAMINE SACCHARATE, AMPHETAMINE ASPARTATE, DEXTROAMPHETAMINE SULFATE AND AMPHETAMINE SULFATE 2.5; 2.5; 2.5; 2.5 MG/1; MG/1; MG/1; MG/1
10 TABLET ORAL DAILY
Qty: 30 TABLET | Refills: 0 | Status: SHIPPED | OUTPATIENT
Start: 2025-05-18 | End: 2025-06-17

## 2025-04-18 RX ORDER — DEXTROAMPHETAMINE SACCHARATE, AMPHETAMINE ASPARTATE, DEXTROAMPHETAMINE SULFATE AND AMPHETAMINE SULFATE 2.5; 2.5; 2.5; 2.5 MG/1; MG/1; MG/1; MG/1
10 TABLET ORAL DAILY
Qty: 30 TABLET | Refills: 0 | Status: SHIPPED | OUTPATIENT
Start: 2025-04-18 | End: 2025-05-18

## 2025-04-18 NOTE — PROGRESS NOTES
processes, able to follow commands   Eyes: EOM intact, normal sclera   Mouth: mucous membranes moist   Neck: no visualized mass   Resp: normal effort and no respiratory distress   Neuro: no gross deficits   Musculoskeletal: normal ROM of neck   Skin: no discoloration or lesions of concern on visible areas   Psychiatric: normal affect, no hallucinations     Nasra Monae, was evaluated through a synchronous (real-time) audio-video encounter. The patient (or guardian if applicable) is aware that this is a billable service, which includes applicable co-pays. This Virtual Visit was conducted with patient's (and/or legal guardian's) consent. Patient identification was verified, and a caregiver was present when appropriate.   The patient was located at Home: [address] [facility] [address]  Provider was located at Home (Appt Dept State): [place]  Confirm you are appropriately licensed, registered, or certified to deliver care in the state where the patient is located as indicated above. If you are not or unsure, please re-schedule the visit: Yes, I confirm.         CAMRON Perez NP

## 2025-04-18 NOTE — TELEPHONE ENCOUNTER
No chief complaint on file.    Last Appointment with Dr. Amarilis Mullins:  2/18/2025   No future appointments.

## 2025-04-21 RX ORDER — IPRATROPIUM BROMIDE AND ALBUTEROL SULFATE 2.5; .5 MG/3ML; MG/3ML
SOLUTION RESPIRATORY (INHALATION)
Qty: 180 ML | Refills: 0 | OUTPATIENT
Start: 2025-04-21

## 2025-05-05 ENCOUNTER — TELEPHONE (OUTPATIENT)
Age: 35
End: 2025-05-05

## 2025-05-05 NOTE — TELEPHONE ENCOUNTER
Self Referral:  Pt called to set up genetic consult after her father was tested.  Pt stated she would like to do a vv  Pt stated she is not on her dads PHI but was told she did not have to be as long as she had a copy of her dads results at the time of the appt.    pt is scheduled for 5/20/25 at 8:30am vv

## 2025-05-16 NOTE — PROGRESS NOTES
[facility]  Genetic Counseling   [address]  [place] [address]  Phone: [phone]  Fax: [phone]    Date of Visit: 5/20/2025  Patient Name: Nasra Monae  YOB: 1990  Referring Provider: None (self-referral)    HISTORY OF PRESENT ILLNESS  Nasra Monae is a 35 y.o. female with no personal cancer history who presents today for genetic counseling due to a known BRCA2 pathogenic variant (mutation) in her father, which was identified on germline genetic testing related to his own cancer history. The mutation was initially identified on somatic tumor testing ([facility]) related to his metastatic poorly differentiated adenocarcinoma diagnosis and was confirmed to be of germline origin through [facility].    We reviewed her medical and family history and discussed germline genetic testing options, including the implications of the different types of possible test results.     The appointment was conducted via telehealth ([facility] video visit) and Ms. Monae was accompanied by her mother.    Medical History  Cancer History:  None.    Gynecologic History:  Onset of menses at age 11y. 3 children. First completed pregnancy at age 29y.  Ovaries and uterus intact    Cancer Screening History:  Breast cancer screening:  No prior breast screening imaging.  Last clinical breast exam ~1.5 years ago, has appointment with OBGYN next week.    Gynecological cancer screening  Last Pap 2023 wnl.   03/20/2023 -  OB TRANSVAGINAL (Arnoldo Lai MD)  Indication: fever and cramping, recent spontaneous miscarriage   The right ovary measures 3.5 x 1.4 cm and the left ovary measures 2.9 x 2.1 cm. The ovaries are normal in appearance. No adnexal masses are identified.   GI cancer screening  Screening colonoscopy initiated due to age.   Skin cancer screening  Last full body skin exam ~1 year ago.  Patient denies other cancer screenings    Social

## 2025-05-20 ENCOUNTER — TELEMEDICINE (OUTPATIENT)
Age: 35
End: 2025-05-20

## 2025-05-20 DIAGNOSIS — Z80.42 FAMILY HISTORY OF PROSTATE CANCER: ICD-10-CM

## 2025-05-20 DIAGNOSIS — Z80.3 FAMILY HISTORY OF BREAST CANCER: ICD-10-CM

## 2025-05-20 DIAGNOSIS — Z84.81 FAMILY HISTORY OF CARRIER OF GENETIC DISEASE: Primary | ICD-10-CM

## 2025-05-20 DIAGNOSIS — Z71.83 ENCOUNTER FOR NONPROCREATIVE GENETIC COUNSELING AND TESTING: ICD-10-CM

## 2025-05-20 DIAGNOSIS — Z13.71 ENCOUNTER FOR NONPROCREATIVE GENETIC COUNSELING AND TESTING: ICD-10-CM

## 2025-06-20 ENCOUNTER — SCHEDULED TELEPHONE ENCOUNTER (OUTPATIENT)
Age: 35
End: 2025-06-20

## 2025-06-20 DIAGNOSIS — Z15.01 BRCA2 POSITIVE: Primary | ICD-10-CM

## 2025-06-20 DIAGNOSIS — Z71.83 ENCOUNTER FOR NONPROCREATIVE GENETIC COUNSELING: ICD-10-CM

## 2025-06-20 DIAGNOSIS — Z15.09 BRCA2 POSITIVE: Primary | ICD-10-CM

## 2025-06-20 NOTE — PROGRESS NOTES
performed in an experienced, high-volume center after an in-depth discussion about the potential limitations to screening. Consider screening using annual contrast-enhanced MRI/magnetic resonance cholangiopancreatography (MRCP) and/or endoscopic ultrasound (EUS).  We briefly discussed the potential opportunity to contribute to scientific research related to pancreatic cancer early detection through the [employer] consortium. Ms. Monae will think about this. Discussed referral to CRYSTAL Dawn if desired in the future to discuss in more detail.     Melanoma risk: No specific screening guidelines exist for melanoma, but general melanoma risk management is appropriate, such as annual full-body skin examination and minimizing UV exposure. More information can be found at the website for the [employer] (www.skincancer.org)    We briefly reviewed that additional BRCA2-associated cancer risks for men include male breast cancer and prostate cancer.     We reviewed autosomal dominant vs. autosomal recessive inheritance and potential implications to family members.  The hereditary cancer risk related to BRCA2 is inherited in an autosomal dominant pattern. The patient's first degree relatives have a 50% chance to have the same mutation found in the patient. While the patient's daughters have a 50% chance to have the same BRCA2 mutation identified in the patient, we discussed that germline genetic testing for adult onset conditions is not typically performed on minors. The organization [employer] ([employer]) has resources about sharing genetic test results with relatives, including children.  Also associated with BRCA2 is an autosomal recessive condition called Fanconi Anemia. Fanconi anemia is characterized by developmental abnormalities in major organ systems, early-onset bone marrow failure, and a high predisposition to cancer. Bone marrow failure with pancytopenia often presents in the  82 year old male with history of CAD s/p CABG, HLD, HTN, DM 2, COPD on home O2, PVD/LE stents, remote hx of L femoral fx with complicated course/multiple procedures/chronic OM sent by ID with draining sinus from left thigh.     1) Acute on chronic hypoxemic respiratory failure  - likely volume overload from  transfusion  - Continue Lasix   - Weaned off supplemental oxygen    2) Chronic L femur OM/L thigh abscess   - IR was discussed and no drainage was recommended  - Continue Cefepime   - Will likely need PICC line next week for long term antibiotics  - ID follow up noted     3) Acute Blood Loss Anemia   - Likely due to GIB  - s/p 6 PRBCs  - s/p EGD on 3/13: showing gastric ulcer; gastritis; gastric ulcer; hiatal hernia  - s/p colonoscopy on 3/17: showing cecal polyp/avm s/p bx and clip   - Continue Protonix and Carafate   - Add Senna, Miralax and Anusol Suppositories for hemorrhoids   - GI follow up noted     4) CAD / PVD  - Antiplatelets on hold   - Continue Lipitor and Metoprolol   - Cardio follow up noted     5) DM 2  - A1c 8  - Accu checks and ISS  - Continue Lantus     6) Asthma / COPD / COURTNEY  - Continue Symbicort and Spiriva   - On Nucala every 4 weeks   - Nocturnal NIV  - Pulmonary follow up noted     7) Hypokalemia  - Replete    DVT Prophylaxis -- Venodyne     Dispo: Home with Home Care next week.     Updated patient's wife at bedside.  82 year old male with history of CAD s/p CABG, HLD, HTN, DM 2, COPD on home O2, PVD/LE stents, remote hx of L femoral fx with complicated course/multiple procedures/chronic OM sent by ID with draining sinus from left thigh.     1) Acute on chronic hypoxemic respiratory failure  - likely volume overload from  transfusion  - Continue Lasix   - Weaned off supplemental oxygen    2) Chronic L femur OM/L thigh abscess   - IR was discussed and no drainage was recommended  - Continue Cefepime   - Will likely need PICC line next week for long term antibiotics  - ID follow up noted     3) Acute Blood Loss Anemia   - Likely due to GIB  - s/p 6 PRBCs  - s/p EGD on 3/13: showing gastric ulcer; gastritis; gastric ulcer; hiatal hernia  - s/p colonoscopy on 3/17: showing cecal polyp/avm s/p bx and clip   - Continue Protonix and Carafate   - Add Senna, Miralax and Anusol Suppositories for hemorrhoids   - GI follow up noted     4) History of Paroxysmal Atrial Tachycardia   - Continue Metoprolol  - Eliquis on hold     5) CAD / PVD  - Antiplatelets on hold   - Continue Lipitor and Metoprolol   - Cardio follow up noted     6) DM 2  - A1c 8  - Accu checks and ISS  - Continue Lantus     7) Asthma / COPD / COURTNEY  - Continue Symbicort and Spiriva   - On Nucala every 4 weeks   - Nocturnal NIV  - Pulmonary follow up noted     8) Hypokalemia  - Replete    DVT Prophylaxis -- Venodyne     Dispo: Home with Home Care next week.     Updated patient's wife at bedside.

## 2025-07-01 ENCOUNTER — TELEPHONE (OUTPATIENT)
Age: 35
End: 2025-07-01

## 2025-07-01 NOTE — TELEPHONE ENCOUNTER
Patient referred to Dr. Hsu by Dr. Unique Dyson.  Called and left messages for patient on 6/24/25, 6/25/25. Called patient again on 7-1-25 and made appointment for Tuesday, July 15th @ 9:00am at [facility].  
55

## 2025-07-10 ENCOUNTER — OFFICE VISIT (OUTPATIENT)
Age: 35
End: 2025-07-10
Payer: COMMERCIAL

## 2025-07-10 VITALS — BODY MASS INDEX: 20.57 KG/M2 | HEIGHT: 66 IN | WEIGHT: 128 LBS

## 2025-07-10 DIAGNOSIS — Z15.09 BRCA2 GENE MUTATION POSITIVE: Primary | ICD-10-CM

## 2025-07-10 DIAGNOSIS — Z15.01 BRCA2 GENE MUTATION POSITIVE: Primary | ICD-10-CM

## 2025-07-10 PROCEDURE — 1036F TOBACCO NON-USER: CPT | Performed by: SURGERY

## 2025-07-10 PROCEDURE — G8428 CUR MEDS NOT DOCUMENT: HCPCS | Performed by: SURGERY

## 2025-07-10 PROCEDURE — 99204 OFFICE O/P NEW MOD 45 MIN: CPT | Performed by: SURGERY

## 2025-07-10 PROCEDURE — G8420 CALC BMI NORM PARAMETERS: HCPCS | Performed by: SURGERY

## 2025-07-10 NOTE — PROGRESS NOTES
HISTORY OF PRESENT ILLNESS  Nasra Monae is a 35 y.o. female     HPI NEW patient consult referred by Unique Dyson for BRCA 2 mutation.     Family History:  Mother had breast cancer dx age 55, survivor   Maternal grandmother - breast cancer dx age 70's, survivor  Paternal grandmother  breast cancer dx age 80's, survivor   Father is BRCA2 +.  Father  with pancreatic or small bowel cancer age 80  Brother is BRCA2 neg      Mammogram, n/a, BIRADS n/a    Past Medical History:   Diagnosis Date    ADD (attention deficit disorder)     Allergic rhinitis, cause unspecified     Asthma     exercise induced    MRSA (methicillin resistant Staphylococcus aureus) carrier      Past Surgical History:   Procedure Laterality Date    ORTHOPEDIC SURGERY  2006    orif  left femur metal and 3 pins       OB History          4    Para   4    Term   1            AB        Living   1         SAB        IAB        Ectopic        Molar        Multiple   0    Live Births   1          Obstetric Comments   Menarche 10, LMP 6/15/205, # of children 3, age of 1st delivery 29, Hysterectomy/oophorectomy no/no, Breast bx no, history of breast feeding yes, BCP yes, Hormone therapy no             Family History   Problem Relation Age of Onset    Heart Disease Paternal Grandfather         cad stints    Heart Disease Father         cad with stints    Coronary Art Dis Father     Breast Cancer Mother     Cancer Mother 55        breast     Social History     Tobacco Use    Smoking status: Never    Smokeless tobacco: Never   Substance Use Topics    Alcohol use: Yes     Alcohol/week: 2.0 standard drinks of alcohol      Prior to Admission medications    Medication Sig Start Date End Date Taking? Authorizing Provider   amphetamine-dextroamphetamine (ADDERALL) 10 MG tablet Take 1 tablet by mouth daily for 30 days. Max Daily Amount: 10 mg 25 Yes Carlos Calixto, APRN - NP   ipratropium 0.5 mg-albuterol 2.5 mg (DUONEB)

## 2025-07-14 NOTE — PROGRESS NOTES
New Patient, Referred by Dr. Unique Dyson, BRCA2 gene mutation    1. Have you been to the ER, urgent care clinic since your last visit?  Hospitalized since your last visit?  no    2. Have you seen or consulted any other health care providers outside of the [facility] since your last visit?  Include any pap smears or colon screening.  no

## 2025-07-15 ENCOUNTER — OFFICE VISIT (OUTPATIENT)
Age: 35
End: 2025-07-15
Payer: COMMERCIAL

## 2025-07-15 VITALS
SYSTOLIC BLOOD PRESSURE: 108 MMHG | WEIGHT: 132.94 LBS | HEART RATE: 85 BPM | DIASTOLIC BLOOD PRESSURE: 72 MMHG | HEIGHT: 66 IN | BODY MASS INDEX: 21.36 KG/M2

## 2025-07-15 DIAGNOSIS — Z15.09 BRCA2 POSITIVE: Primary | ICD-10-CM

## 2025-07-15 DIAGNOSIS — Z15.01 BRCA2 POSITIVE: Primary | ICD-10-CM

## 2025-07-15 PROCEDURE — G8420 CALC BMI NORM PARAMETERS: HCPCS | Performed by: OBSTETRICS & GYNECOLOGY

## 2025-07-15 PROCEDURE — 1036F TOBACCO NON-USER: CPT | Performed by: OBSTETRICS & GYNECOLOGY

## 2025-07-15 PROCEDURE — G8427 DOCREV CUR MEDS BY ELIG CLIN: HCPCS | Performed by: OBSTETRICS & GYNECOLOGY

## 2025-07-15 PROCEDURE — 99205 OFFICE O/P NEW HI 60 MIN: CPT | Performed by: OBSTETRICS & GYNECOLOGY

## 2025-07-15 ASSESSMENT — PATIENT HEALTH QUESTIONNAIRE - PHQ9
4. FEELING TIRED OR HAVING LITTLE ENERGY: SEVERAL DAYS
SUM OF ALL RESPONSES TO PHQ QUESTIONS 1-9: 1
1. LITTLE INTEREST OR PLEASURE IN DOING THINGS: NOT AT ALL
7. TROUBLE CONCENTRATING ON THINGS, SUCH AS READING THE NEWSPAPER OR WATCHING TELEVISION: NOT AT ALL
6. FEELING BAD ABOUT YOURSELF - OR THAT YOU ARE A FAILURE OR HAVE LET YOURSELF OR YOUR FAMILY DOWN: NOT AT ALL
8. MOVING OR SPEAKING SO SLOWLY THAT OTHER PEOPLE COULD HAVE NOTICED. OR THE OPPOSITE, BEING SO FIGETY OR RESTLESS THAT YOU HAVE BEEN MOVING AROUND A LOT MORE THAN USUAL: NOT AT ALL
9. THOUGHTS THAT YOU WOULD BE BETTER OFF DEAD, OR OF HURTING YOURSELF: NOT AT ALL
SUM OF ALL RESPONSES TO PHQ QUESTIONS 1-9: 1
SUM OF ALL RESPONSES TO PHQ QUESTIONS 1-9: 1
3. TROUBLE FALLING OR STAYING ASLEEP: NOT AT ALL
5. POOR APPETITE OR OVEREATING: NOT AT ALL
SUM OF ALL RESPONSES TO PHQ QUESTIONS 1-9: 1
10. IF YOU CHECKED OFF ANY PROBLEMS, HOW DIFFICULT HAVE THESE PROBLEMS MADE IT FOR YOU TO DO YOUR WORK, TAKE CARE OF THINGS AT HOME, OR GET ALONG WITH OTHER PEOPLE: NOT DIFFICULT AT ALL
2. FEELING DOWN, DEPRESSED OR HOPELESS: NOT AT ALL

## 2025-07-15 NOTE — PROGRESS NOTES
[facility] GYNECOLOGIC ONCOLOGY  [address]  [place] [address]  P [phone]  F [phone]    Office Note  Patient ID:  Name:  Nasra Monae  MRN:  [id number]  :  1990/35 y.o.  Date:  2025      HISTORY OF PRESENT ILLNESS:  Ms. Nasra Monae is a 35 y.o. female who presents as a new patient from Dr. Unique Dyson for germline BRCA2 mutation (heterozygous for the c.[id number] ([id number]) pathogenic mutation in the BRCA2 gene ).     Patient's father has a known germline BRCA2 pathogenic variant.  Patient underwent genetic testing and was diagnosed with a germline BRCA2 mutation as per above.  Patient was referred to our office for further counseling and management.      ROS:  A comprehensive review of systems was negative except for that written in the History of Present Illness. , 10 point ROS      ECOG stGstrstastdstest:st st1st Problem List:  Patient Active Problem List    Diagnosis Date Noted    BRCA2 positive 2025    Asthma with acute exacerbation 2025    Fear of flying 2019    Allergic rhinitis 2015    Asthma, exercise induced 2011    Attention deficit hyperactivity disorder (ADHD), predominantly inattentive type 2010     PMH:  Past Medical History:   Diagnosis Date    ADD (attention deficit disorder)     Allergic rhinitis, cause unspecified     Asthma     exercise induced    MRSA (methicillin resistant Staphylococcus aureus) carrier       PSH:  Past Surgical History:   Procedure Laterality Date    ORTHOPEDIC SURGERY  2006    orif  left femur metal and 3 pins       Social History:  Social History     Tobacco Use    Smoking status: Never    Smokeless tobacco: Never   Substance Use Topics    Alcohol use: Yes     Alcohol/week: 2.0 standard drinks of alcohol      Family History:  Family History   Problem Relation Age of Onset    Heart Disease Paternal Grandfather         cad stints    Heart Disease Father         cad with stints

## 2025-07-22 ENCOUNTER — PATIENT MESSAGE (OUTPATIENT)
Age: 35
End: 2025-07-22

## 2025-07-22 DIAGNOSIS — F90.0 ATTENTION DEFICIT HYPERACTIVITY DISORDER (ADHD), PREDOMINANTLY INATTENTIVE TYPE: ICD-10-CM

## 2025-07-22 RX ORDER — DEXTROAMPHETAMINE SACCHARATE, AMPHETAMINE ASPARTATE, DEXTROAMPHETAMINE SULFATE AND AMPHETAMINE SULFATE 2.5; 2.5; 2.5; 2.5 MG/1; MG/1; MG/1; MG/1
10 TABLET ORAL DAILY
Qty: 30 TABLET | Refills: 0 | Status: CANCELLED | OUTPATIENT
Start: 2025-07-22 | End: 2025-08-21

## 2025-07-23 ENCOUNTER — TELEPHONE (OUTPATIENT)
Age: 35
End: 2025-07-23

## 2025-07-23 DIAGNOSIS — Z15.01 BRCA2 GENE MUTATION POSITIVE IN FEMALE: Primary | ICD-10-CM

## 2025-07-23 DIAGNOSIS — Z15.09 BRCA2 GENE MUTATION POSITIVE IN FEMALE: Primary | ICD-10-CM

## 2025-07-23 DIAGNOSIS — Z15.02 BRCA2 GENE MUTATION POSITIVE IN FEMALE: Primary | ICD-10-CM

## 2025-07-23 NOTE — TELEPHONE ENCOUNTER
Called patient to review questions re: pancreatic cancer screening received via [employer]. Briefly reviewed that for individuals with a BRCA2 mutation, pancreatic cancer screening can be considered even in the absence of family history of pancreatic cancer. Screening is typically recommended at age 50y (or 10y prior to youngest onset in the family). Patient is interested in meeting with CAMRON Dawn NP to learn more about pancreatic cancer screening and opportunities for contribution to research efforts related to pancreatic cancer early detection.

## 2025-07-28 ENCOUNTER — TELEPHONE (OUTPATIENT)
Age: 35
End: 2025-07-28

## 2025-07-28 NOTE — TELEPHONE ENCOUNTER
Lvm for patient to let them know we got a referral from one of their doctors to come and see Genetics and to call back to get there apt set up.    BRCA2 gene mutation positive in female/dr.PARKER Sidhu apt
